# Patient Record
Sex: FEMALE | Race: WHITE | Employment: OTHER | URBAN - METROPOLITAN AREA
[De-identification: names, ages, dates, MRNs, and addresses within clinical notes are randomized per-mention and may not be internally consistent; named-entity substitution may affect disease eponyms.]

---

## 2017-01-01 ENCOUNTER — HOSPITAL ENCOUNTER (OUTPATIENT)
Dept: PHYSICAL THERAPY | Age: 67
Discharge: HOME | End: 2017-01-31
Attending: ORTHOPAEDIC SURGERY | Admitting: ORTHOPAEDIC SURGERY

## 2017-01-04 ENCOUNTER — HOSPITAL ENCOUNTER (OUTPATIENT)
Dept: PHYSICAL THERAPY | Age: 67
Discharge: HOME | End: 2017-01-04
Admitting: ORTHOPAEDIC SURGERY

## 2017-01-04 ENCOUNTER — HOSPITAL ENCOUNTER (OUTPATIENT)
Dept: PHYSICAL THERAPY | Age: 67
Discharge: HOME | End: 2016-12-31
Admitting: ORTHOPAEDIC SURGERY

## 2017-01-19 ENCOUNTER — HOSPITAL ENCOUNTER (OUTPATIENT)
Dept: PHYSICAL THERAPY | Age: 67
Discharge: HOME | End: 2017-01-19
Admitting: ORTHOPAEDIC SURGERY

## 2017-02-01 ENCOUNTER — HOSPITAL ENCOUNTER (OUTPATIENT)
Dept: PHYSICAL THERAPY | Age: 67
Discharge: HOME | End: 2017-02-28
Attending: ORTHOPAEDIC SURGERY | Admitting: ORTHOPAEDIC SURGERY

## 2017-02-16 ENCOUNTER — HOSPITAL ENCOUNTER (OUTPATIENT)
Dept: PHYSICAL THERAPY | Age: 67
Discharge: HOME | End: 2017-02-16
Admitting: ORTHOPAEDIC SURGERY

## 2017-02-17 ENCOUNTER — OFFICE VISIT (OUTPATIENT)
Dept: ORTHOPEDIC SURGERY | Age: 67
End: 2017-02-17

## 2017-02-17 VITALS — HEIGHT: 63 IN | BODY MASS INDEX: 28.36 KG/M2 | WEIGHT: 160.05 LBS

## 2017-02-17 DIAGNOSIS — S42.292S OTHER CLOSED DISPLACED FRACTURE OF PROXIMAL END OF LEFT HUMERUS, SEQUELA: Primary | ICD-10-CM

## 2017-02-17 PROCEDURE — 99024 POSTOP FOLLOW-UP VISIT: CPT | Performed by: ORTHOPAEDIC SURGERY

## 2017-02-17 PROCEDURE — 73030 X-RAY EXAM OF SHOULDER: CPT | Performed by: ORTHOPAEDIC SURGERY

## 2017-03-08 ENCOUNTER — OFFICE VISIT (OUTPATIENT)
Dept: CARDIOLOGY CLINIC | Age: 67
End: 2017-03-08

## 2017-03-08 VITALS
HEART RATE: 51 BPM | OXYGEN SATURATION: 98 % | BODY MASS INDEX: 28.16 KG/M2 | SYSTOLIC BLOOD PRESSURE: 110 MMHG | WEIGHT: 153 LBS | HEIGHT: 62 IN | DIASTOLIC BLOOD PRESSURE: 50 MMHG

## 2017-03-08 DIAGNOSIS — R00.2 PALPITATIONS: ICD-10-CM

## 2017-03-08 DIAGNOSIS — I49.3 PVC (PREMATURE VENTRICULAR CONTRACTION): ICD-10-CM

## 2017-03-08 DIAGNOSIS — I10 ESSENTIAL HYPERTENSION: ICD-10-CM

## 2017-03-08 DIAGNOSIS — I25.10 CORONARY ARTERY DISEASE INVOLVING NATIVE CORONARY ARTERY OF NATIVE HEART WITHOUT ANGINA PECTORIS: Primary | ICD-10-CM

## 2017-03-08 PROCEDURE — 99213 OFFICE O/P EST LOW 20 MIN: CPT | Performed by: INTERNAL MEDICINE

## 2017-10-18 ENCOUNTER — HOSPITAL ENCOUNTER (OUTPATIENT)
Dept: SURGERY | Age: 67
Discharge: HOME | End: 2017-10-18
Attending: OPHTHALMOLOGY | Admitting: OPHTHALMOLOGY

## 2017-10-18 VITALS
TEMPERATURE: 97 F | BODY MASS INDEX: 25.69 KG/M2 | SYSTOLIC BLOOD PRESSURE: 127 MMHG | OXYGEN SATURATION: 97 % | DIASTOLIC BLOOD PRESSURE: 60 MMHG | HEIGHT: 63 IN | HEART RATE: 65 BPM | RESPIRATION RATE: 16 BRPM | WEIGHT: 145 LBS

## 2017-10-18 LAB
GLUCOSE BLD-MCNC: 100 MG/DL (ref 70–99)
PERFORMED ON: ABNORMAL

## 2017-10-18 RX ORDER — MEPERIDINE HYDROCHLORIDE 50 MG/ML
12.5 INJECTION INTRAMUSCULAR; INTRAVENOUS; SUBCUTANEOUS EVERY 5 MIN PRN
Status: DISCONTINUED | OUTPATIENT
Start: 2017-10-18 | End: 2017-10-19 | Stop reason: HOSPADM

## 2017-10-18 RX ORDER — PROMETHAZINE HYDROCHLORIDE 25 MG/ML
6.25 INJECTION, SOLUTION INTRAMUSCULAR; INTRAVENOUS
Status: ACTIVE | OUTPATIENT
Start: 2017-10-18 | End: 2017-10-18

## 2017-10-18 RX ORDER — LIDOCAINE HYDROCHLORIDE 10 MG/ML
1 INJECTION, SOLUTION EPIDURAL; INFILTRATION; INTRACAUDAL; PERINEURAL
Status: ACTIVE | OUTPATIENT
Start: 2017-10-18 | End: 2017-10-18

## 2017-10-18 RX ORDER — HYDRALAZINE HYDROCHLORIDE 20 MG/ML
5 INJECTION INTRAMUSCULAR; INTRAVENOUS EVERY 10 MIN PRN
Status: DISCONTINUED | OUTPATIENT
Start: 2017-10-18 | End: 2017-10-19 | Stop reason: HOSPADM

## 2017-10-18 RX ORDER — DIPHENHYDRAMINE HYDROCHLORIDE 50 MG/ML
12.5 INJECTION INTRAMUSCULAR; INTRAVENOUS
Status: ACTIVE | OUTPATIENT
Start: 2017-10-18 | End: 2017-10-18

## 2017-10-18 RX ORDER — SODIUM CHLORIDE, SODIUM LACTATE, POTASSIUM CHLORIDE, CALCIUM CHLORIDE 600; 310; 30; 20 MG/100ML; MG/100ML; MG/100ML; MG/100ML
INJECTION, SOLUTION INTRAVENOUS CONTINUOUS
Status: DISCONTINUED | OUTPATIENT
Start: 2017-10-18 | End: 2017-10-19 | Stop reason: HOSPADM

## 2017-10-18 RX ORDER — LABETALOL HYDROCHLORIDE 5 MG/ML
5 INJECTION, SOLUTION INTRAVENOUS EVERY 10 MIN PRN
Status: DISCONTINUED | OUTPATIENT
Start: 2017-10-18 | End: 2017-10-19 | Stop reason: HOSPADM

## 2017-10-18 RX ORDER — ONDANSETRON 2 MG/ML
4 INJECTION INTRAMUSCULAR; INTRAVENOUS
Status: ACTIVE | OUTPATIENT
Start: 2017-10-18 | End: 2017-10-18

## 2017-10-18 ASSESSMENT — PAIN - FUNCTIONAL ASSESSMENT: PAIN_FUNCTIONAL_ASSESSMENT: 0-10

## 2017-10-18 NOTE — H&P
I have examined the patient and reviewed the history and physical and find no relevant changes. I have reviewed with the patient and/or family the risks, benefits, and alternatives to the procedure and they have agreed to proceed.     Kodak Hughes.

## 2017-10-18 NOTE — ANESTHESIA PRE-OP
HFA;VENTOLIN HFA) 108 (90 BASE) MCG/ACT inhaler Inhale 2 puffs into the lungs every 4 hours as needed. Current Facility-Administered Medications   Medication Dose Route Frequency Provider Last Rate Last Dose    lactated ringers infusion   Intravenous Continuous Bernadine Nathan MD        lidocaine PF 1 % injection 1 mL  1 mL Intradermal Once PRN Bernadine Nathan MD        bupivacaine 0.75%, phenylephrine 10%, tropicamide 1%, cyclopentolate 1%, moxifloxacin 0.5%, ketorolac 0.5% in lidocaine 2% jelly ophthalmic solution  0.3 mL Right Eye Q10 Min PRN Luis Fernando Ponce MD   0.3 mL at 10/18/17 8255    bupivacaine 0.75% and lidocaine 2% in hylenex injection (10.5 mL)   Intraocular Once Luis Fernando Ponce MD        bupivacaine 0.75% and lidocaine 2% in hylenex injection (4.5 mL)   Intraocular Once Luis Fernando oPnce MD           Allergies:     Allergies   Allergen Reactions    Morphine Other (See Comments)     Chest pain    Dust Mite Extract     Pcn [Penicillins]      Nausea      Percocet [Oxycodone-Acetaminophen]      Nausea      Toradol [Ketorolac Tromethamine] Other (See Comments)     Muscle spasms in chest       Problem List:    Patient Active Problem List   Diagnosis Code    Chest pain R07.9    Hypertension I10    Hyperlipidemia E78.5    Gross hematuria R31.0    Obstructive uropathy N13.9    PVC's (premature ventricular contractions) I49.3    Palpitations R00.2    Coronary artery disease involving native coronary artery of native heart without angina pectoris I25.10    Closed fracture of proximal end of left humerus S42.202A       Past Medical History:        Diagnosis Date    Arthritis     Asthma     Diabetes mellitus ([facility] [address]     type 2, takes PO meds    History of palpitations     Hyperlipidemia     Hypertension        Past Surgical History:        Procedure Laterality Date    CHOLECYSTECTOMY      COLONOSCOPY      CORONARY ANGIOPLASTY  10/30/14    CYST REMOVAL      from right wrist     ENDOSCOPY,

## 2017-11-08 ENCOUNTER — HOSPITAL ENCOUNTER (OUTPATIENT)
Dept: SURGERY | Age: 67
Discharge: HOME | End: 2017-11-08
Attending: OPHTHALMOLOGY | Admitting: OPHTHALMOLOGY

## 2017-11-08 VITALS
SYSTOLIC BLOOD PRESSURE: 143 MMHG | HEIGHT: 63 IN | BODY MASS INDEX: 25.69 KG/M2 | RESPIRATION RATE: 14 BRPM | WEIGHT: 145 LBS | DIASTOLIC BLOOD PRESSURE: 59 MMHG | TEMPERATURE: 97.4 F | OXYGEN SATURATION: 97 % | HEART RATE: 70 BPM

## 2017-11-08 LAB
GLUCOSE BLD-MCNC: 92 MG/DL (ref 70–99)
PERFORMED ON: NORMAL

## 2017-11-08 RX ORDER — SODIUM CHLORIDE 0.9 % (FLUSH) 0.9 %
10 SYRINGE (ML) INJECTION PRN
Status: DISCONTINUED | OUTPATIENT
Start: 2017-11-08 | End: 2017-11-09 | Stop reason: HOSPADM

## 2017-11-08 RX ORDER — SODIUM CHLORIDE 0.9 % (FLUSH) 0.9 %
10 SYRINGE (ML) INJECTION EVERY 12 HOURS SCHEDULED
Status: DISCONTINUED | OUTPATIENT
Start: 2017-11-08 | End: 2017-11-09 | Stop reason: HOSPADM

## 2017-11-08 RX ORDER — LIDOCAINE HYDROCHLORIDE 10 MG/ML
1 INJECTION, SOLUTION EPIDURAL; INFILTRATION; INTRACAUDAL; PERINEURAL
Status: ACTIVE | OUTPATIENT
Start: 2017-11-08 | End: 2017-11-08

## 2017-11-08 RX ORDER — SODIUM CHLORIDE, SODIUM LACTATE, POTASSIUM CHLORIDE, CALCIUM CHLORIDE 600; 310; 30; 20 MG/100ML; MG/100ML; MG/100ML; MG/100ML
INJECTION, SOLUTION INTRAVENOUS CONTINUOUS
Status: DISCONTINUED | OUTPATIENT
Start: 2017-11-08 | End: 2017-11-09 | Stop reason: HOSPADM

## 2017-11-08 RX ADMIN — SODIUM CHLORIDE, SODIUM LACTATE, POTASSIUM CHLORIDE, CALCIUM CHLORIDE: 600; 310; 30; 20 INJECTION, SOLUTION INTRAVENOUS at 11:03

## 2017-11-08 ASSESSMENT — PAIN SCALES - GENERAL: PAINLEVEL_OUTOF10: 0

## 2017-11-08 ASSESSMENT — PAIN - FUNCTIONAL ASSESSMENT: PAIN_FUNCTIONAL_ASSESSMENT: 0-10

## 2017-11-08 NOTE — OP NOTE
[facility]                   Rafaelniranjan Jezpratima                                  OPERATIVE REPORT    PATIENT NAME: Andie Cadena                    :        1950  MED REC NO:   [id number]                          ROOM:  ACCOUNT NO:   [de-identified]                          ADMIT DATE: 2017  PROVIDER:     Ephraim Winters MD    DATE OF PROCEDURE:  2017    PREOPERATIVE DIAGNOSIS:  Cataract, left eye. POSTOPERATIVE DIAGNOSIS:  Cataract, left eye. OPERATION:  Phacoemulsification of the cataract of the left eye with  implant. ANESTHESIA:  General / Monitored Anesthesia Care / Retrobulbar. A 2 mL  retrobulbar block and 10 mL modified Grant block using a 1:1 mixture of  0.75% Marcaine, 4% Xylocaine with epinephrine, and hyaluronidase. SURGICAL INDICATIONS:  The patient has had a painless progressive loss of  vision due to the cataractous degeneration of the lens and for that reason,  surgery is indicated. The patient is having visual problems with current  lifestyle. A new eyeglasses prescription was unable to adequately improve  functional vision. DETAILS OF PROCEDURE:  The patient was taken to the operating room and was  prepped and draped in the usual manner after obtaining satisfactory local  anesthesia. Attention was turned towards the operative eye and a lid speculum was  inserted. A 2.5 mm keratome was used to make a limbal incision at 180  degrees. Viscoat was then placed in the eye to fill the anterior chamber  and a side port incision was made with a Superblade through the clear  cornea. The incision was located about 2 o'clock to the left of the  original incision. A bent needle was then used to make a cut in the  anterior capsule and start a capsulorrhexis tear. This was then grasped  with Utrata forceps and a 360 degree tear was completed.   Poulan dissection  was then done with BSS to separate the

## 2017-11-08 NOTE — ANESTHESIA PRE-OP
Department of Anesthesiology  Preprocedure Note       Name:  Laura Meza   Age:  69 y.o.  :  1950                                          MRN:  [id number]         Date:  2017      Surgeon:    Procedure:    Medications prior to admission:   Prior to Admission medications    Medication Sig Start Date End Date Taking? Authorizing Provider   lisinopril-hydrochlorothiazide (PRINZIDE;ZESTORETIC) 20-12.5 MG per tablet Take 1 tablet by mouth daily    Historical Provider, MD   levocetirizine (XYZAL) 5 MG tablet Take 5 mg by mouth nightly    Historical Provider, MD   Linagliptin (TRADJENTA PO) Take by mouth    Historical Provider, MD   Cyanocobalamin (VITAMIN B-12 PO) Take by mouth daily    Historical Provider, MD   VITAMIN D, CHOLECALCIFEROL, PO Take by mouth daily    Historical Provider, MD   Dulaglutide (TRULICITY) 1.5 CI/2.5TE SOPN Inject into the skin once a week    Historical Provider, MD   metoprolol tartrate (LOPRESSOR) 25 MG tablet TAKE 1 TABLET BY MOUTH DAILY 17   Maddi Kearney MD   ondansetron [facility]) 4 MG tablet Take 2 tablets by mouth every 8 hours as needed for Nausea or Vomiting 12/15/16 12/15/17  Velvet Murray MD   magnesium oxide (MAGNESIUM-OXIDE) 400 (241.3 MG) MG TABS tablet Take 1 tablet by mouth 2 times daily 16   Maddi Kearney MD   Blood Pressure Monitoring (B-D ASSURE BPM/AUTO ARM CUFF) MISC For essential hypertension 11/11/15   Maddi Kearney MD   gabapentin (NEURONTIN) 100 MG capsule Take 800 mg by mouth 3 times daily She only takes at night because it makes her tired     Historical Provider, MD   tiZANidine (ZANAFLEX) 4 MG tablet Take 4 mg by mouth every 6 hours as needed. Historical Provider, MD   lidocaine (LIDODERM) 5 % Place 1 patch onto the skin as needed for Pain. 12 hours on, 12 hours off. Historical Provider, MD   calcium carbonate (TUMS) 500 MG chewable tablet Take 1 tablet by mouth daily.     Historical Provider, MD   metFORMIN (GLUCOPHAGE) 500 MG tablet Take 1,000 mg by mouth 2 times daily (with meals). Historical Provider, MD   albuterol (PROVENTIL HFA;VENTOLIN HFA) 108 (90 BASE) MCG/ACT inhaler Inhale 2 puffs into the lungs every 4 hours as needed. Historical Provider, MD   guaiFENesin (MUCINEX) 600 MG SR tablet Take 1,200 mg by mouth 2 times daily as needed. Historical Provider, MD   simvastatin (ZOCOR) 20 MG tablet Take 20 mg by mouth nightly. Historical Provider, MD   levocetirizine (XYZAL) 5 MG tablet Take 5 mg by mouth nightly. Historical Provider, MD       Current medications:    Current Outpatient Prescriptions   Medication Sig Dispense Refill    lisinopril-hydrochlorothiazide (PRINZIDE;ZESTORETIC) 20-12.5 MG per tablet Take 1 tablet by mouth daily      levocetirizine (XYZAL) 5 MG tablet Take 5 mg by mouth nightly      Linagliptin (TRADJENTA PO) Take by mouth      Cyanocobalamin (VITAMIN B-12 PO) Take by mouth daily      VITAMIN D, CHOLECALCIFEROL, PO Take by mouth daily      Dulaglutide (TRULICITY) 1.5 HQ/4.2VX SOPN Inject into the skin once a week      metoprolol tartrate (LOPRESSOR) 25 MG tablet TAKE 1 TABLET BY MOUTH DAILY 90 tablet 2    ondansetron (ZOFRAN) 4 MG tablet Take 2 tablets by mouth every 8 hours as needed for Nausea or Vomiting 30 tablet 0    magnesium oxide (MAGNESIUM-OXIDE) 400 (241.3 MG) MG TABS tablet Take 1 tablet by mouth 2 times daily 60 tablet 11    Blood Pressure Monitoring (B-D ASSURE BPM/AUTO ARM CUFF) MISC For essential hypertension 1 each 0    gabapentin (NEURONTIN) 100 MG capsule Take 800 mg by mouth 3 times daily She only takes at night because it makes her tired       tiZANidine (ZANAFLEX) 4 MG tablet Take 4 mg by mouth every 6 hours as needed.  lidocaine (LIDODERM) 5 % Place 1 patch onto the skin as needed for Pain. 12 hours on, 12 hours off.  calcium carbonate (TUMS) 500 MG chewable tablet Take 1 tablet by mouth daily.       metFORMIN (GLUCOPHAGE) 500 MG tablet Take 1,000 mg by mouth 2 times daily (with meals).  albuterol (PROVENTIL HFA;VENTOLIN HFA) 108 (90 BASE) MCG/ACT inhaler Inhale 2 puffs into the lungs every 4 hours as needed.  guaiFENesin (MUCINEX) 600 MG SR tablet Take 1,200 mg by mouth 2 times daily as needed.  simvastatin (ZOCOR) 20 MG tablet Take 20 mg by mouth nightly.  levocetirizine (XYZAL) 5 MG tablet Take 5 mg by mouth nightly. Current Facility-Administered Medications   Medication Dose Route Frequency Provider Last Rate Last Dose    [START ON 11/8/2017] bupivacaine 0.75% and lidocaine 2% in hylenex injection (10.5 mL)   Intraocular Once Rhoda Mike MD        [START ON 11/8/2017] bupivacaine 0.75% and lidocaine 2% in hylenex injection (4.5 mL)   Intraocular Once Rhoda Mike MD           Allergies: Allergies   Allergen Reactions    Morphine Other (See Comments)     Chest pain    Sulfamethoxazole      Other reaction(s): Nausea/vomit    Trimethoprim      Other reaction(s): Nausea/vomit    Alendronate Other (See Comments)     Chest pain     Bactrim [Sulfamethoxazole-Trimethoprim]     Buspirone      Other reaction(s): Other (See Comments)  Gi upset     Calcitonin (War)      Other reaction(s): Other (See Comments)  Headsaches     Demerol Hcl [Meperidine]     Dust Mite Extract     Esomeprazole Magnesium      Other reaction(s): Other (See Comments)  ELEVATED BP    Glipizide Other (See Comments)     Hypoglycemia    Metformin Diarrhea    Mometasone     Omeprazole      Other reaction(s):  Other (See Comments)  Sick to stomach     Other Other (See Comments)     Trazadone- vomiting     Oxycodone      Other reaction(s): Nausea;Vomitting    Oxycodone-Acetaminophen     Pcn [Penicillins]      Other reaction(s): Trouble Breathing  Nausea      Percocet [Oxycodone-Acetaminophen]      Nausea      Prednisone     Propoxyphene     Ranitidine      Other reaction(s): Nausea    Sulfa Antibiotics     Toradol [Ketorolac Tromethamine] Other (See Comments)     Muscle spasms in chest    Tramadol      Other reaction(s): Other (See Comments)  Dizziness     Zantac [Ranitidine Hcl]        Problem List:    Patient Active Problem List   Diagnosis Code    Chest pain R07.9    Hypertension I10    Hyperlipidemia E78.5    Gross hematuria R31.0    Obstructive uropathy N13.9    PVC's (premature ventricular contractions) I49.3    Palpitations R00.2    Coronary artery disease involving native coronary artery of native heart without angina pectoris I25.10    Closed fracture of proximal end of left humerus S42.202A       Past Medical History:        Diagnosis Date    Arthritis     Asthma     Diabetes mellitus ([facility] [address]     type 2, takes PO meds    History of palpitations     Hyperlipidemia     Hypertension        Past Surgical History:        Procedure Laterality Date    CATARACT REMOVAL WITH IMPLANT Right 10/18/2017    entered to epic from h&P    [address]  10/30/14    CYST REMOVAL      from right wrist     ENDOSCOPY, COLON, DIAGNOSTIC      GASTRIC BYPASS SURGERY  2005    HERNIA REPAIR      HYSTERECTOMY      age 30    OTHER SURGICAL HISTORY  CYSTOSCOPY, RIGHT URETERAL STONE MANIPULATION WITH RIGHT    SHOULDER ARTHROPLASTY Left 12/15/2016    LEFT PROXIMAL HUMERUS OPEN REDUCTION INTERNAL FIXATION     TONSILLECTOMY         Social History:    Social History   Substance Use Topics    Smoking status: Never Smoker    Smokeless tobacco: Never Used    Alcohol use No                                Counseling given: Not Answered      Vital Signs (Current): There were no vitals filed for this visit.                                            BP Readings from Last 3 Encounters:   10/18/17 127/60   03/08/17 (!) 110/50   12/16/16 121/72       NPO Status:                                                                                 BMI:   Wt Readings from Last 3 Encounters:   11/02/17 145 lb (65.8 kg) the risks and benefits of PIV, MAC, IV Narcotics, PACU. All questions were answered the patient agrees with the plan)        Anesthetic plan and risks discussed with patient. Plan discussed with CRNA.                   Maci Bailey MD   11/7/2017

## 2017-11-08 NOTE — BRIEF OP NOTE
Brief Postoperative Note    Kinga Benitez  YOB: 1950  [id number]    Pre-operative Diagnosis: CATARACT OS    Post-operative Diagnosis: Same    Procedure: PHACO OS WITH IOL    Anesthesia: MAC    Surgeons/Assistants: Abimbola Fleming MD    Estimated Blood Loss: less than 50     Complications: None    Specimens: Was Not Obtained    Findings: NONE    Electronically signed by Tracee Underwood MD on 11/8/2017 at 1:04 PM

## 2017-11-14 NOTE — H&P
I have reviewed the history and physical and examined the patient. I found no relevant changes. I have reviewed with the patient and/or family the risks, benefits, and alternatives to the procedure.

## 2018-04-04 ENCOUNTER — HOSPITAL ENCOUNTER (OUTPATIENT)
Dept: SURGERY | Age: 68
Discharge: HOME | End: 2018-04-04
Attending: OPHTHALMOLOGY | Admitting: OPHTHALMOLOGY

## 2018-04-04 VITALS — HEART RATE: 58 BPM | SYSTOLIC BLOOD PRESSURE: 124 MMHG | DIASTOLIC BLOOD PRESSURE: 63 MMHG

## 2018-04-04 DIAGNOSIS — R07.9 CHEST PAIN: ICD-10-CM

## 2018-04-04 RX ORDER — TROPICAMIDE 10 MG/ML
1 SOLUTION/ DROPS OPHTHALMIC EVERY 5 MIN PRN
Status: DISCONTINUED | OUTPATIENT
Start: 2018-04-04 | End: 2018-04-05 | Stop reason: HOSPADM

## 2018-04-04 RX ORDER — PREDNISOLONE ACETATE 10 MG/ML
1 SUSPENSION/ DROPS OPHTHALMIC
Status: COMPLETED | OUTPATIENT
Start: 2018-04-04 | End: 2018-04-04

## 2018-04-04 RX ORDER — PROPARACAINE HYDROCHLORIDE 5 MG/ML
1 SOLUTION/ DROPS OPHTHALMIC
Status: COMPLETED | OUTPATIENT
Start: 2018-04-04 | End: 2018-04-04

## 2018-04-04 RX ORDER — PHENYLEPHRINE HCL 2.5 %
1 DROPS OPHTHALMIC (EYE) EVERY 5 MIN PRN
Status: DISCONTINUED | OUTPATIENT
Start: 2018-04-04 | End: 2018-04-05 | Stop reason: HOSPADM

## 2018-04-04 RX ADMIN — TROPICAMIDE 1 DROP: 10 SOLUTION/ DROPS OPHTHALMIC at 14:01

## 2018-04-04 RX ADMIN — PREDNISOLONE ACETATE 1 DROP: 10 SUSPENSION/ DROPS OPHTHALMIC at 14:44

## 2018-04-04 RX ADMIN — TROPICAMIDE 1 DROP: 10 SOLUTION/ DROPS OPHTHALMIC at 14:07

## 2018-04-04 RX ADMIN — PROPARACAINE HYDROCHLORIDE 1 DROP: 5 SOLUTION/ DROPS OPHTHALMIC at 14:40

## 2018-04-04 RX ADMIN — Medication 1 DROP: at 14:07

## 2018-04-04 RX ADMIN — Medication 1 DROP: at 14:01

## 2018-04-11 ENCOUNTER — HOSPITAL ENCOUNTER (OUTPATIENT)
Dept: SURGERY | Age: 68
Discharge: HOME | End: 2018-04-11
Attending: OPHTHALMOLOGY | Admitting: OPHTHALMOLOGY

## 2018-04-11 VITALS — DIASTOLIC BLOOD PRESSURE: 59 MMHG | SYSTOLIC BLOOD PRESSURE: 123 MMHG | HEART RATE: 63 BPM

## 2018-04-11 DIAGNOSIS — R07.9 CHEST PAIN: ICD-10-CM

## 2018-04-11 RX ORDER — PHENYLEPHRINE HCL 2.5 %
1 DROPS OPHTHALMIC (EYE) EVERY 5 MIN PRN
Status: DISCONTINUED | OUTPATIENT
Start: 2018-04-11 | End: 2018-04-12 | Stop reason: HOSPADM

## 2018-04-11 RX ORDER — PREDNISOLONE ACETATE 10 MG/ML
1 SUSPENSION/ DROPS OPHTHALMIC
Status: COMPLETED | OUTPATIENT
Start: 2018-04-11 | End: 2018-04-11

## 2018-04-11 RX ORDER — PROPARACAINE HYDROCHLORIDE 5 MG/ML
1 SOLUTION/ DROPS OPHTHALMIC
Status: COMPLETED | OUTPATIENT
Start: 2018-04-11 | End: 2018-04-11

## 2018-04-11 RX ORDER — TROPICAMIDE 10 MG/ML
1 SOLUTION/ DROPS OPHTHALMIC EVERY 5 MIN PRN
Status: DISCONTINUED | OUTPATIENT
Start: 2018-04-11 | End: 2018-04-12 | Stop reason: HOSPADM

## 2018-04-11 RX ADMIN — Medication 1 DROP: at 14:21

## 2018-04-11 RX ADMIN — PREDNISOLONE ACETATE 1 DROP: 10 SUSPENSION/ DROPS OPHTHALMIC at 15:13

## 2018-04-11 RX ADMIN — Medication 1 DROP: at 14:16

## 2018-04-11 RX ADMIN — TROPICAMIDE 1 DROP: 10 SOLUTION/ DROPS OPHTHALMIC at 14:16

## 2018-04-11 RX ADMIN — PROPARACAINE HYDROCHLORIDE 1 DROP: 5 SOLUTION/ DROPS OPHTHALMIC at 15:09

## 2018-04-11 RX ADMIN — TROPICAMIDE 1 DROP: 10 SOLUTION/ DROPS OPHTHALMIC at 14:21

## 2018-04-19 ENCOUNTER — OFFICE VISIT (OUTPATIENT)
Dept: CARDIOLOGY CLINIC | Age: 68
End: 2018-04-19

## 2018-04-19 VITALS
HEART RATE: 65 BPM | SYSTOLIC BLOOD PRESSURE: 118 MMHG | OXYGEN SATURATION: 97 % | DIASTOLIC BLOOD PRESSURE: 60 MMHG | BODY MASS INDEX: 23.04 KG/M2 | HEIGHT: 63 IN | WEIGHT: 130 LBS

## 2018-04-19 DIAGNOSIS — I25.10 CORONARY ARTERY DISEASE INVOLVING NATIVE CORONARY ARTERY OF NATIVE HEART WITHOUT ANGINA PECTORIS: ICD-10-CM

## 2018-04-19 DIAGNOSIS — I10 ESSENTIAL HYPERTENSION: ICD-10-CM

## 2018-04-19 DIAGNOSIS — E78.5 HYPERLIPIDEMIA, UNSPECIFIED HYPERLIPIDEMIA TYPE: ICD-10-CM

## 2018-04-19 DIAGNOSIS — R00.2 PALPITATIONS: ICD-10-CM

## 2018-04-19 DIAGNOSIS — I49.3 PVC'S (PREMATURE VENTRICULAR CONTRACTIONS): Primary | ICD-10-CM

## 2018-04-19 PROCEDURE — 99214 OFFICE O/P EST MOD 30 MIN: CPT | Performed by: INTERNAL MEDICINE

## 2018-05-29 RX ORDER — METOPROLOL TARTRATE 50 MG/1
50 TABLET, FILM COATED ORAL 2 TIMES DAILY
Qty: 180 TABLET | Refills: 3 | Status: SHIPPED | OUTPATIENT
Start: 2018-05-29 | End: 2019-05-17 | Stop reason: SDUPTHER

## 2018-06-22 ENCOUNTER — TELEPHONE (OUTPATIENT)
Dept: CARDIOLOGY CLINIC | Age: 68
End: 2018-06-22

## 2018-06-22 ENCOUNTER — HOSPITAL ENCOUNTER (OUTPATIENT)
Dept: CARDIOLOGY | Facility: CLINIC | Age: 68
Discharge: HOME | End: 2018-06-22
Attending: INTERNAL MEDICINE | Admitting: INTERNAL MEDICINE

## 2018-06-22 DIAGNOSIS — R07.9 CHEST PAIN: ICD-10-CM

## 2018-06-22 LAB
LV EF: 55 %
LVEF MODALITY: NORMAL

## 2018-06-27 ENCOUNTER — TELEPHONE (OUTPATIENT)
Dept: CARDIOLOGY CLINIC | Age: 68
End: 2018-06-27

## 2019-05-17 RX ORDER — METOPROLOL TARTRATE 50 MG/1
TABLET, FILM COATED ORAL
Qty: 180 TABLET | Refills: 0 | Status: SHIPPED | OUTPATIENT
Start: 2019-05-17 | End: 2019-08-24 | Stop reason: SDUPTHER

## 2019-05-17 NOTE — TELEPHONE ENCOUNTER
4/19/18 Dr. Izaiah Dutta:  1. Echocardiogram for AS  2. Obtain blood work results from PCP, need LIPIDS to adjust statin  3. Increase metoprolol to 50 mg BID as tolerated  4.  Follow up in 1 year    Next OV 5/23/19 JOELLEN

## 2019-06-14 NOTE — PROGRESS NOTES
[address] [facility]   Cardiovascular Evaluation    PATIENT: Vannesa Abdul  DATE: 19  MRN: [id number]  [facility]: [id number]  : 1950      Primary Care Doctor: Myron Faulkner     Reason for evaluation:   1 Year Follow Up; Coronary Artery Disease; Hypertension; Hyperlipidemia; Chest Pain; Palpitations; Shortness of Breath; and Cardiac Clearance (upcoming colonoscopy)      Subjective:   History of present illness on initial date of evaluation:   Vannesa Abdul is a 77 y.o. patient who presents for follow up for chest pain. She reported having intermittent episodes of chest pain for past year. She reported to having chest pain with activity as well as rest.  She awoke with pain at 4 am. She described the pain mid sternal and left anterior chest which felt like a heavy pressure squeezing pain with some burning characteristics. Pain radiated in to her back, shoulder and down her left arm. She had associated SOB, diaphoresis, nausea and near syncope. Pain lasted 15-20 minutes and resolved without intervention. She also had twinges of sharp chest pain periodically. She described a previous episode of similar chest pain that occurred April 3,  2013 and was [facility] to [facility] [place] with full work up which was negative. At her last visit she had complaints of palpitations she described as a fast heart rate. She reported to having SOB, fatigue and dizziness. She reported dizziness on exam. She recently wore a 24 hour holter. She reported to continuing to having runs of PVC's,however they resolved quickly on their own. On follow up 11/11/15 she stated that she had been following with Dr. Wing Huddleston. Episodes of palpitations had greatly improved with the current medication regimen. Complains of BLE swelling and pain, worse with walking. Pain was in the feet, ankles, calf and glutes. Wakes up with swelling, worsens through out the day.   Denied exertional chest pain and shortness of breath. On follow-up 12/8/15 she stated that since stopping Norvasc her leg swelling had improved but now her episodes of palpitation have increased. Palpitations occured daily. The episodes are bothersome. She is extremely fatigued after the episodes of palpitation. She wore a holter monitor on 12/8/16 showed asymptomatic idoventricular rhythm. At her last visit her metoprolol was increased to 50 mg BID. She reports today that she is having a sharp chest pain that lasts for about 5 minutes. She does not notice that exertion makes it worse. She does notice that bending over and holding her chest helps relieve the pain. She is short of breath but attributes it to her asthma. She reduced her metoprolol on her own due to dizziness. She had blood ion her stool and will be having a colonoscopy. Patient Active Problem List   Diagnosis    Chest pain    Hypertension    Hyperlipidemia    Gross hematuria    Obstructive uropathy    PVC's (premature ventricular contractions)    Palpitations    Coronary artery disease involving native coronary artery of native heart without angina pectoris    Closed fracture of proximal end of left humerus         Past Medical History:   has a past medical history of Arthritis, Asthma, Diabetes mellitus ([address] History of palpitations, Hyperlipidemia, and Hypertension. Surgical History:   has a past surgical history that includes Cholecystectomy; Gastric bypass surgery (2005); Hysterectomy; hernia repair; cyst removal; Tonsillectomy; Colonoscopy; Endoscopy, colon, diagnostic; Coronary angioplasty (10/30/14); other surgical history (CYSTOSCOPY, RIGHT URETERAL STONE MANIPULATION WITH RIGHT); Total shoulder arthroplasty (Left, 12/15/2016); Cataract removal with implant (Right, 10/18/2017); and Cataract removal with implant (Left, 11/08/2017). Social History:   She is , retired from [address] [place] [address] and lives with her son in [place].   She reports that  simvastatin (ZOCOR) 20 MG tablet Take 20 mg by mouth nightly. No current facility-administered medications for this visit. Allergies:  Morphine; Sulfamethoxazole; Trimethoprim; Alendronate; Bactrim [sulfamethoxazole-trimethoprim]; Buspirone; Calcitonin (salmon); Demerol hcl [meperidine]; Dust mite extract; Esomeprazole magnesium; Glipizide; Metformin; Mometasone; Omeprazole; Other; Oxycodone; Oxycodone-acetaminophen; Pcn [penicillins]; Percocet [oxycodone-acetaminophen]; Prednisone; Propoxyphene; Ranitidine; Sulfa antibiotics; Toradol [ketorolac tromethamine]; Tramadol; and Zantac [ranitidine hcl]     Review of Systems:   All 12 point review of symptoms completed. Pertinent positives identified in the HPI, all other review of symptoms negative as below. Objective:   PHYSICAL EXAM:    Vitals:    06/17/19 0801   BP: 112/66   Pulse: 63   SpO2: 98%    Weight: 120 lb 8 oz (54.7 kg)     Wt Readings from Last 3 Encounters:   06/17/19 120 lb 8 oz (54.7 kg)   04/19/18 130 lb (59 kg)   11/08/17 145 lb (65.8 kg)         General Appearance:  Alert, cooperative, no distress, appears stated age   Head:  Normocephalic, atraumatic   Eyes:  PERRL, conjunctiva/corneas clear   Nose: Nares normal, no drainage or sinus tenderness   Throat: Lips, mucosa, and tongue normal   Neck: Supple, symmetrical, trachea midline, NL thyroid no carotid bruit or JVD   Lungs:   CTAB, respirations unlabored   Chest Wall:  No tenderness or deformity   Heart:  RRR s1s2  noted; no rub or gallop   Abdomen:   Soft, non-tender, +BS x 4, no masses, no organomegaly   Extremities: Extremities normal, atraumatic, no cyanosis. no edema.    Pulses: 2+ and symmetric   Skin: Skin color, texture, turgor normal, no rashes or lesions   Pysch: Normal mood and affect   Neurologic: Normal gross motor and sensory exam.         LABS   CBC:      Lab Results   Component Value Date    WBC 6.2 12/15/2016    RBC 4.29 12/15/2016    HGB 13.0 12/15/2016    HCT 40.4 12/15/2016    MCV 94.1 12/15/2016    RDW 13.1 12/15/2016     12/15/2016     CMP:  Lab Results   Component Value Date     12/15/2016    K 4.8 12/15/2016     12/15/2016    CO2 29 12/15/2016    BUN 19 12/15/2016    CREATININE 0.9 12/15/2016    GFRAA >60 12/15/2016    GFRAA >60 04/02/2013    AGRATIO 1.5 01/05/2015    LABGLOM >60 12/15/2016    GLUCOSE 166 12/15/2016    PROT 7.5 01/05/2015    CALCIUM 10.2 12/15/2016    BILITOT 0.6 01/05/2015    ALKPHOS 59 01/05/2015    AST 24 01/05/2015    ALT 20 01/05/2015     PT/INR:   No components found for: PTPATIENT,  PTINR  Liver:  No components found for: CHLPL  Lab Results   Component Value Date    ALT 20 01/05/2015    AST 24 01/05/2015    ALKPHOS 59 01/05/2015    BILITOT 0.6 01/05/2015     Lab Results   Component Value Date    LABA1C 6.5 01/05/2015     Lipids:         Lab Results   Component Value Date    TRIG 113 12/11/2015    TRIG 84 10/30/2014            Lab Results   Component Value Date    HDL 57 12/11/2015    HDL 70 (H) 10/30/2014            Lab Results   Component Value Date    LDLCALC 59 12/11/2015    LDLCALC 77 10/30/2014            Lab Results   Component Value Date    LABVLDL 23 12/11/2015    LABVLDL 17 10/30/2014         CARDIAC DATA   EKG: (reviewed)  10/21/2014 Sinus beba, possible old anterior/septal infarct. 3/27/2015 Sinus rhythm with bigeminy PVC, old anterior infarct pattern,    Holter results: 4/1/15  Holter reviewed. Significant multifocal ectopy. >15% PVC burden, <1% atrial ectopy. Resting beba heart rate. At night, most ectopy especially ventricular ectopy had become quiet. Labs ok    6/17/2019 NSR with PRWP    ECHO 6/22/18:  Normal left ventricle systolic function with an estimated ejection fraction   of 55%. No regional wall motion abnormalities are seen. Grade I diastolic dysfunction with normal filing pressure. The non-coronary cusp of the aortic valve is thickened/calcified with   decreased leaflet mobility.  No aortic

## 2019-06-17 ENCOUNTER — OFFICE VISIT (OUTPATIENT)
Dept: CARDIOLOGY CLINIC | Age: 69
End: 2019-06-17
Payer: MEDICARE

## 2019-06-17 VITALS
OXYGEN SATURATION: 98 % | DIASTOLIC BLOOD PRESSURE: 66 MMHG | BODY MASS INDEX: 21.35 KG/M2 | SYSTOLIC BLOOD PRESSURE: 112 MMHG | HEIGHT: 63 IN | HEART RATE: 63 BPM | WEIGHT: 120.5 LBS

## 2019-06-17 DIAGNOSIS — I10 ESSENTIAL HYPERTENSION: ICD-10-CM

## 2019-06-17 DIAGNOSIS — R42 DIZZINESS: ICD-10-CM

## 2019-06-17 DIAGNOSIS — I49.3 PVC (PREMATURE VENTRICULAR CONTRACTION): ICD-10-CM

## 2019-06-17 DIAGNOSIS — I35.8 AORTIC VALVE SCLEROSIS: ICD-10-CM

## 2019-06-17 DIAGNOSIS — R07.89 OTHER CHEST PAIN: Primary | ICD-10-CM

## 2019-06-17 PROCEDURE — 99214 OFFICE O/P EST MOD 30 MIN: CPT | Performed by: INTERNAL MEDICINE

## 2019-06-17 PROCEDURE — 93000 ELECTROCARDIOGRAM COMPLETE: CPT | Performed by: INTERNAL MEDICINE

## 2019-06-17 NOTE — LETTER
[address] [facility]   Cardiovascular Evaluation    PATIENT: Ginny Austin  DATE: 19  MRN: [id number]  CSN: [id number]  : 1950      Primary Care Doctor: Kinga Zapata     Reason for evaluation:   1 Year Follow Up; Coronary Artery Disease; Hypertension; Hyperlipidemia; Chest Pain; Palpitations; Shortness of Breath; and Cardiac Clearance (upcoming colonoscopy)      Subjective:   History of present illness on initial date of evaluation:   Ginny Austin is a 77 y.o. patient who presents for follow up for chest pain. She reported having intermittent episodes of chest pain for past year. She reported to having chest pain with activity as well as rest.  She awoke with pain at 4 am. She described the pain mid sternal and left anterior chest which felt like a heavy pressure squeezing pain with some burning characteristics. Pain radiated in to her back, shoulder and down her left arm. She had associated SOB, diaphoresis, nausea and near syncope. Pain lasted 15-20 minutes and resolved without intervention. She also had twinges of sharp chest pain periodically. She described a previous episode of similar chest pain that occurred April 3,  2013 and was admitted to [facility] with full work up which was negative. At her last visit she had complaints of palpitations she described as a fast heart rate. She reported to having SOB, fatigue and dizziness. She reported dizziness on exam. She recently wore a 24 hour holter. She reported to continuing to having runs of PVC's,however they resolved quickly on their own. On follow up 11/11/15 she stated that she had been following with Dr. Geri Morris. Episodes of palpitations had greatly improved with the current medication regimen. Complains of BLE swelling and pain, worse with walking. Pain was in the feet, ankles, calf and glutes. Wakes up with swelling, worsens through out the day.   Denied exertional chest pain and shortness of breath. On follow-up 12/8/15 she stated that since stopping Norvasc her leg swelling had improved but now her episodes of palpitation have increased. Palpitations occured daily. The episodes are bothersome. She is extremely fatigued after the episodes of palpitation. She wore a holter monitor on 12/8/16 showed asymptomatic idoventricular rhythm. At her last visit her metoprolol was increased to 50 mg BID. She reports today that she is having a sharp chest pain that lasts for about 5 minutes. She does not notice that exertion makes it worse. She does notice that bending over and holding her chest helps relieve the pain. She is short of breath but attributes it to her asthma. She reduced her metoprolol on her own due to dizziness. She had blood ion her stool and will be having a colonoscopy. Patient Active Problem List   Diagnosis    Chest pain    Hypertension    Hyperlipidemia    Gross hematuria    Obstructive uropathy    PVC's (premature ventricular contractions)    Palpitations    Coronary artery disease involving native coronary artery of native heart without angina pectoris    Closed fracture of proximal end of left humerus         Past Medical History:   has a past medical history of Arthritis, Asthma, Diabetes mellitus ([address] History of palpitations, Hyperlipidemia, and Hypertension. Surgical History:   has a past surgical history that includes Cholecystectomy; Gastric bypass surgery (2005); Hysterectomy; hernia repair; cyst removal; Tonsillectomy; Colonoscopy; Endoscopy, colon, diagnostic; Coronary angioplasty (10/30/14); other surgical history (CYSTOSCOPY, RIGHT URETERAL STONE MANIPULATION WITH RIGHT); Total shoulder arthroplasty (Left, 12/15/2016); Cataract removal with implant (Right, 10/18/2017); and Cataract removal with implant (Left, 11/08/2017).      Social History:   She is , retired from [employer] and lives with her son in [place] [facility]. She reports that she has never smoked. She does not have any smokeless tobacco history on file. She reports that she does not drink alcohol or use illicit drugs. Family History: Dad history unknown  No evidence for sudden cardiac death or premature CAD    Home Medications:  Reviewed and are listed in nursing record. and/or listed below  Current Outpatient Medications   Medication Sig Dispense Refill    metoprolol tartrate (LOPRESSOR) 50 MG tablet TAKE 1 TABLET BY MOUTH TWICE DAILY 180 tablet 0    MAGNESIUM-OXIDE 400 (241.3 Mg) MG TABS tablet TAKE 1 TABLET BY MOUTH TWICE DAILY 180 tablet 3    lisinopril-hydrochlorothiazide (PRINZIDE;ZESTORETIC) 20-12.5 MG per tablet Take 1 tablet by mouth daily      levocetirizine (XYZAL) 5 MG tablet Take 5 mg by mouth nightly      Linagliptin (TRADJENTA PO) Take by mouth      Cyanocobalamin (VITAMIN B-12 PO) Take by mouth daily      VITAMIN D, CHOLECALCIFEROL, PO Take by mouth daily      Dulaglutide (TRULICITY) 1.5 MY/1.2HD SOPN Inject into the skin once a week      magnesium oxide (MAGNESIUM-OXIDE) 400 (241.3 MG) MG TABS tablet Take 1 tablet by mouth 2 times daily 60 tablet 11    Blood Pressure Monitoring (B-D ASSURE BPM/AUTO ARM CUFF) MISC For essential hypertension 1 each 0    gabapentin (NEURONTIN) 100 MG capsule Take 500 mg by mouth 4 times daily. She only takes at night because it makes her tired       tiZANidine (ZANAFLEX) 4 MG tablet Take 4 mg by mouth every 6 hours as needed.  lidocaine (LIDODERM) 5 % Place 1 patch onto the skin as needed for Pain. 12 hours on, 12 hours off.  calcium carbonate (TUMS) 500 MG chewable tablet Take 1 tablet by mouth daily.  metFORMIN (GLUCOPHAGE) 500 MG tablet Take 1,000 mg by mouth 2 times daily (with meals).  albuterol (PROVENTIL HFA;VENTOLIN HFA) 108 (90 BASE) MCG/ACT inhaler Inhale 2 puffs into the lungs every 4 hours as needed.  guaiFENesin (MUCINEX) 600 MG SR tablet Take 1,200 mg by mouth 2 times daily as needed.  simvastatin (ZOCOR) 20 MG tablet Take 20 mg by mouth nightly. No current facility-administered medications for this visit. Allergies:  Morphine; Sulfamethoxazole; Trimethoprim; Alendronate; Bactrim [sulfamethoxazole-trimethoprim]; Buspirone; Calcitonin (salmon); Demerol hcl [meperidine]; Dust mite extract; Esomeprazole magnesium; Glipizide; Metformin; Mometasone; Omeprazole; Other; Oxycodone; Oxycodone-acetaminophen; Pcn [penicillins]; Percocet [oxycodone-acetaminophen]; Prednisone; Propoxyphene; Ranitidine; Sulfa antibiotics; Toradol [ketorolac tromethamine]; Tramadol; and Zantac [ranitidine hcl]     Review of Systems:   All 12 point review of symptoms completed. Pertinent positives identified in the HPI, all other review of symptoms negative as below. Objective:   PHYSICAL EXAM:    Vitals:    06/17/19 0801   BP: 112/66   Pulse: 63   SpO2: 98%    Weight: 120 lb 8 oz (54.7 kg)     Wt Readings from Last 3 Encounters:   06/17/19 120 lb 8 oz (54.7 kg)   04/19/18 130 lb (59 kg)   11/08/17 145 lb (65.8 kg)         General Appearance:  Alert, cooperative, no distress, appears stated age   Head:  Normocephalic, atraumatic   Eyes:  PERRL, conjunctiva/corneas clear   Nose: Nares normal, no drainage or sinus tenderness   Throat: Lips, mucosa, and tongue normal   Neck: Supple, symmetrical, trachea midline, NL thyroid no carotid bruit or JVD   Lungs:   CTAB, respirations unlabored   Chest Wall:  No tenderness or deformity   Heart:  RRR s1s2  noted; no rub or gallop   Abdomen:   Soft, non-tender, +BS x 4, no masses, no organomegaly   Extremities: Extremities normal, atraumatic, no cyanosis. no edema.    Pulses: 2+ and symmetric   Skin: Skin color, texture, turgor normal, no rashes or lesions   Pysch: Normal mood and affect   Neurologic: Normal gross motor and sensory exam.         LABS   CBC:      Lab Results Component Value Date    WBC 6.2 12/15/2016    RBC 4.29 12/15/2016    HGB 13.0 12/15/2016    HCT 40.4 12/15/2016    MCV 94.1 12/15/2016    RDW 13.1 12/15/2016     12/15/2016     CMP:  Lab Results   Component Value Date     12/15/2016    K 4.8 12/15/2016     12/15/2016    CO2 29 12/15/2016    BUN 19 12/15/2016    CREATININE 0.9 12/15/2016    GFRAA >60 12/15/2016    GFRAA >60 04/02/2013    AGRATIO 1.5 01/05/2015    LABGLOM >60 12/15/2016    GLUCOSE 166 12/15/2016    PROT 7.5 01/05/2015    CALCIUM 10.2 12/15/2016    BILITOT 0.6 01/05/2015    ALKPHOS 59 01/05/2015    AST 24 01/05/2015    ALT 20 01/05/2015     PT/INR:   No components found for: PTPATIENT,  PTINR  Liver:  No components found for: CHLPL  Lab Results   Component Value Date    ALT 20 01/05/2015    AST 24 01/05/2015    ALKPHOS 59 01/05/2015    BILITOT 0.6 01/05/2015     Lab Results   Component Value Date    LABA1C 6.5 01/05/2015     Lipids:         Lab Results   Component Value Date    TRIG 113 12/11/2015    TRIG 84 10/30/2014            Lab Results   Component Value Date    HDL 57 12/11/2015    HDL 70 (H) 10/30/2014            Lab Results   Component Value Date    LDLCALC 59 12/11/2015    LDLCALC 77 10/30/2014            Lab Results   Component Value Date    LABVLDL 23 12/11/2015    LABVLDL 17 10/30/2014         CARDIAC DATA   EKG: (reviewed)  10/21/2014 Sinus beba, possible old anterior/septal infarct. 3/27/2015 Sinus rhythm with bigeminy PVC, old anterior infarct pattern,    Holter results: 4/1/15  Holter reviewed. Significant multifocal ectopy. >15% PVC burden, <1% atrial ectopy. Resting beba heart rate. At night, most ectopy especially ventricular ectopy had become quiet. Labs ok    6/17/2019 NSR with PRWP    ECHO 6/22/18:  Normal left ventricle systolic function with an estimated ejection fraction   of 55%. No regional wall motion abnormalities are seen. Grade I diastolic dysfunction with normal filing pressure. The non-coronary cusp of the aortic valve is thickened/calcified with   decreased leaflet mobility. No aortic stenosis noted. Mild pulmonic regurgitation. Mild tricuspid regurgitation. Systolic pulmonary artery pressure (SPAP) is normal and estimated at 24 mmHg   (right atrial pressure 3 mmHg). LV Diastolic Dimension: 4.4 cm LV Systolic Dimension: 6.67 cm   LV Septum Diastolic: 0.7 cm   LV PW Diastolic: 4.95 cm       AO Root Dimension: 2.6 cm                                  AV Cusp Separation: 1.3 cm                                  LA Dimension: 2.7 cm   LVOT: 2.1 cm                   LA Area: 13.4 cm2                                  LA volume/Index: 36.33 ml /23 ml/m2     STRESS TEST: 4/3/13  Lexiscan   Findings- Myocardial perfusion is normal at both stress and rest.   Left ventricular cavity size is normal and wall motion is uniform. The estimated left ventricular ejection fraction is 59%       VASCULAR/OTHER IMAGING:  Carotid 1/15/13  No significant plaque or flow limiting internal carotid artery   stenosis       CATH lab 10/30/2014  LM, LAD, LCX, RCA with no significant CAD (RCA with <10%)  Significant kinking and tortousity of vessel  LVEDP 5  LVEF 65%    PLAN  1. No significant CAD (only <10% in prox RCA)  2. CP likley from HTn and stress, possibly from coronary kinking. VIOLA: 11/16/15  Summary    There is no arterial insufficiency in the lower extremities bilaterally at  rest.    VL LE: 11/16/15  Summary    1. There is no evidence of deep or superficial venous thrombosis seen  involving the lower extremities bilaterally. 2. There is no evidence of deep or superficial venous insufficiency  involving the lower extremities bilaterally. Assessment and Plan   Sierra Fuentes is a 77 y.o. female who presents today for the following problems:      1. CAD: cath showed <10% in 2014, very minimal CAD.  No  CP     - some CP returning, aatypical 2. PVCs: Old Holter reviewed. Significant multifocal ectopy. >15% PVC burden, <1% atrial ectopy. With medication--> new holter 12/8/2015 with <0.1% burden              -  states very minimal except with stress  3. Dizziness: occasionally with BB  4. Hypertension: controlled  5. Aortic sclerosis: no issues, will follow      Plan:  1. Some atypical CP, given hx of DM will get Wandy-myoview (pt unable to gxt due to hips)  2. Ok to proceed with colonoscopy      QUALITY MEASURES  1. Tobacco Cessation Counseling: NA  2. Retake of BP if >140/90:   NA  3. Documentation to PCP/referring for new patient:  Sent to PCP at close of office visit  4. CAD patient on anti-platelet: Yes  5. CAD patient on STATIN therapy:  Yes  6. Patient with CHF and aFib on anticoagulation:  NA     Scribe's attestation: This note was scribed in the presence of Dr. Osman Troncoso by Raheel Hewitt, GINA    It is a pleasure to assist in the care of Guerrero Aguilar. Please call with any questions.       Osman Troncoso MD, [address] [facility] Cardiologist  [address]  [phone] [facility]  [phone] [address] [place] [address]  6/17/2019  8:21 AM

## 2019-06-19 NOTE — TELEPHONE ENCOUNTER
Pt is requesting for a refill on her Magnesium Oxide. Pt saw Hans Persons on 6-17-19. Please advise, thank you.  Inga in [facility]

## 2019-06-25 ENCOUNTER — TELEPHONE (OUTPATIENT)
Dept: CARDIOLOGY CLINIC | Age: 69
End: 2019-06-25

## 2019-06-25 NOTE — TELEPHONE ENCOUNTER
Pt stated her previous insurance dropped her and she cannot get the magnesium oxide anymore. She is requesting an alternative medication. She is requesting to have a call back at [phone].      Pt uses [email or web address] [id number] - [facility] [address] [phone] - F [phone]

## 2019-06-25 NOTE — TELEPHONE ENCOUNTER
Mag oxide should be over the counter or pharmacist should be able to help with what to choose and dosing

## 2019-08-27 RX ORDER — METOPROLOL TARTRATE 50 MG/1
TABLET, FILM COATED ORAL
Qty: 180 TABLET | Refills: 3 | Status: SHIPPED | OUTPATIENT
Start: 2019-08-27 | End: 2020-08-28

## 2020-08-28 RX ORDER — METOPROLOL TARTRATE 50 MG/1
TABLET, FILM COATED ORAL
Qty: 180 TABLET | Refills: 1 | Status: ON HOLD | OUTPATIENT
Start: 2020-08-28 | End: 2021-09-28 | Stop reason: HOSPADM

## 2021-09-19 ENCOUNTER — APPOINTMENT (OUTPATIENT)
Dept: GENERAL RADIOLOGY | Age: 71
DRG: 871 | End: 2021-09-19
Payer: MEDICARE

## 2021-09-19 ENCOUNTER — HOSPITAL ENCOUNTER (INPATIENT)
Age: 71
LOS: 9 days | Discharge: HOME HEALTH | DRG: 871 | End: 2021-09-28
Attending: EMERGENCY MEDICINE | Admitting: INTERNAL MEDICINE
Payer: MEDICARE

## 2021-09-19 DIAGNOSIS — N17.9 AKI (ACUTE KIDNEY INJURY) (HCC): Primary | ICD-10-CM

## 2021-09-19 DIAGNOSIS — R06.02 SHORTNESS OF BREATH: ICD-10-CM

## 2021-09-19 DIAGNOSIS — R07.9 CHEST PAIN, UNSPECIFIED TYPE: ICD-10-CM

## 2021-09-19 PROBLEM — J18.9 PNA (PNEUMONIA): Status: ACTIVE | Noted: 2021-09-19

## 2021-09-19 PROBLEM — J18.9 PNEUMONIA: Status: ACTIVE | Noted: 2021-09-19

## 2021-09-19 LAB
A/G RATIO: 1.2 (ref 1.1–2.2)
ALBUMIN SERPL-MCNC: 3.7 G/DL (ref 3.4–5)
ALP BLD-CCNC: 58 U/L (ref 40–129)
ALT SERPL-CCNC: 44 U/L (ref 10–40)
ANION GAP SERPL CALCULATED.3IONS-SCNC: 15 MMOL/L (ref 3–16)
AST SERPL-CCNC: 47 U/L (ref 15–37)
BASOPHILS ABSOLUTE: 0 K/UL (ref 0–0.2)
BASOPHILS RELATIVE PERCENT: 0.4 %
BILIRUB SERPL-MCNC: 0.7 MG/DL (ref 0–1)
BILIRUBIN URINE: NEGATIVE
BLOOD, URINE: ABNORMAL
BUN BLDV-MCNC: 38 MG/DL (ref 7–20)
CALCIUM SERPL-MCNC: 8.9 MG/DL (ref 8.3–10.6)
CHLORIDE BLD-SCNC: 96 MMOL/L (ref 99–110)
CLARITY: CLEAR
CO2: 19 MMOL/L (ref 21–32)
COLOR: YELLOW
CREAT SERPL-MCNC: 1.4 MG/DL (ref 0.6–1.2)
EKG ATRIAL RATE: 77 BPM
EKG DIAGNOSIS: NORMAL
EKG P AXIS: 59 DEGREES
EKG P-R INTERVAL: 146 MS
EKG Q-T INTERVAL: 430 MS
EKG QRS DURATION: 122 MS
EKG QTC CALCULATION (BAZETT): 486 MS
EKG R AXIS: -29 DEGREES
EKG T AXIS: 79 DEGREES
EKG VENTRICULAR RATE: 77 BPM
EOSINOPHILS ABSOLUTE: 0 K/UL (ref 0–0.6)
EOSINOPHILS RELATIVE PERCENT: 0 %
EPITHELIAL CELLS, UA: NORMAL /HPF (ref 0–5)
GFR AFRICAN AMERICAN: 45
GFR NON-AFRICAN AMERICAN: 37
GLOBULIN: 3.1 G/DL
GLUCOSE BLD-MCNC: 113 MG/DL (ref 70–99)
GLUCOSE BLD-MCNC: 127 MG/DL (ref 70–99)
GLUCOSE BLD-MCNC: 173 MG/DL (ref 70–99)
GLUCOSE URINE: >=1000 MG/DL
HCT VFR BLD CALC: 34.7 % (ref 36–48)
HEMOGLOBIN: 11 G/DL (ref 12–16)
KETONES, URINE: 15 MG/DL
LEUKOCYTE ESTERASE, URINE: NEGATIVE
LYMPHOCYTES ABSOLUTE: 0.5 K/UL (ref 1–5.1)
LYMPHOCYTES RELATIVE PERCENT: 8.9 %
MCH RBC QN AUTO: 28.4 PG (ref 26–34)
MCHC RBC AUTO-ENTMCNC: 31.6 G/DL (ref 31–36)
MCV RBC AUTO: 89.8 FL (ref 80–100)
MICROSCOPIC EXAMINATION: YES
MONOCYTES ABSOLUTE: 0.4 K/UL (ref 0–1.3)
MONOCYTES RELATIVE PERCENT: 6.6 %
NEUTROPHILS ABSOLUTE: 4.9 K/UL (ref 1.7–7.7)
NEUTROPHILS RELATIVE PERCENT: 84.1 %
NITRITE, URINE: NEGATIVE
PDW BLD-RTO: 24 % (ref 12.4–15.4)
PERFORMED ON: ABNORMAL
PERFORMED ON: ABNORMAL
PH UA: 5.5 (ref 5–8)
PLATELET # BLD: 107 K/UL (ref 135–450)
PMV BLD AUTO: 8.1 FL (ref 5–10.5)
POTASSIUM REFLEX MAGNESIUM: 5.1 MMOL/L (ref 3.5–5.1)
PROTEIN UA: NEGATIVE MG/DL
RBC # BLD: 3.87 M/UL (ref 4–5.2)
RBC UA: NORMAL /HPF (ref 0–4)
SARS-COV-2, NAAT: NOT DETECTED
SODIUM BLD-SCNC: 130 MMOL/L (ref 136–145)
SPECIFIC GRAVITY UA: 1.01 (ref 1–1.03)
TOTAL PROTEIN: 6.8 G/DL (ref 6.4–8.2)
TROPONIN: <0.01 NG/ML
URINE REFLEX TO CULTURE: ABNORMAL
URINE TYPE: ABNORMAL
UROBILINOGEN, URINE: 0.2 E.U./DL
WBC # BLD: 5.8 K/UL (ref 4–11)
WBC UA: NORMAL /HPF (ref 0–5)

## 2021-09-19 PROCEDURE — 85025 COMPLETE CBC W/AUTO DIFF WBC: CPT

## 2021-09-19 PROCEDURE — 2580000003 HC RX 258: Performed by: PHYSICIAN ASSISTANT

## 2021-09-19 PROCEDURE — 6370000000 HC RX 637 (ALT 250 FOR IP): Performed by: NURSE PRACTITIONER

## 2021-09-19 PROCEDURE — 93010 ELECTROCARDIOGRAM REPORT: CPT | Performed by: INTERNAL MEDICINE

## 2021-09-19 PROCEDURE — 81001 URINALYSIS AUTO W/SCOPE: CPT

## 2021-09-19 PROCEDURE — 80053 COMPREHEN METABOLIC PANEL: CPT

## 2021-09-19 PROCEDURE — 87635 SARS-COV-2 COVID-19 AMP PRB: CPT

## 2021-09-19 PROCEDURE — 96361 HYDRATE IV INFUSION ADD-ON: CPT

## 2021-09-19 PROCEDURE — U0005 INFEC AGEN DETEC AMPLI PROBE: HCPCS

## 2021-09-19 PROCEDURE — 2580000003 HC RX 258: Performed by: NURSE PRACTITIONER

## 2021-09-19 PROCEDURE — 1200000000 HC SEMI PRIVATE

## 2021-09-19 PROCEDURE — 83036 HEMOGLOBIN GLYCOSYLATED A1C: CPT

## 2021-09-19 PROCEDURE — 6360000002 HC RX W HCPCS: Performed by: NURSE PRACTITIONER

## 2021-09-19 PROCEDURE — 99284 EMERGENCY DEPT VISIT MOD MDM: CPT

## 2021-09-19 PROCEDURE — 36415 COLL VENOUS BLD VENIPUNCTURE: CPT

## 2021-09-19 PROCEDURE — 84484 ASSAY OF TROPONIN QUANT: CPT

## 2021-09-19 PROCEDURE — 96360 HYDRATION IV INFUSION INIT: CPT

## 2021-09-19 PROCEDURE — 93005 ELECTROCARDIOGRAM TRACING: CPT | Performed by: PHYSICIAN ASSISTANT

## 2021-09-19 PROCEDURE — U0003 INFECTIOUS AGENT DETECTION BY NUCLEIC ACID (DNA OR RNA); SEVERE ACUTE RESPIRATORY SYNDROME CORONAVIRUS 2 (SARS-COV-2) (CORONAVIRUS DISEASE [COVID-19]), AMPLIFIED PROBE TECHNIQUE, MAKING USE OF HIGH THROUGHPUT TECHNOLOGIES AS DESCRIBED BY CMS-2020-01-R: HCPCS

## 2021-09-19 PROCEDURE — 73502 X-RAY EXAM HIP UNI 2-3 VIEWS: CPT

## 2021-09-19 PROCEDURE — 71045 X-RAY EXAM CHEST 1 VIEW: CPT

## 2021-09-19 RX ORDER — NICOTINE POLACRILEX 4 MG
15 LOZENGE BUCCAL PRN
Status: DISCONTINUED | OUTPATIENT
Start: 2021-09-19 | End: 2021-09-28 | Stop reason: HOSPADM

## 2021-09-19 RX ORDER — FAMOTIDINE 20 MG/1
20 TABLET, FILM COATED ORAL 2 TIMES DAILY
Status: DISCONTINUED | OUTPATIENT
Start: 2021-09-19 | End: 2021-09-28 | Stop reason: HOSPADM

## 2021-09-19 RX ORDER — ONDANSETRON 4 MG/1
4 TABLET, ORALLY DISINTEGRATING ORAL EVERY 8 HOURS PRN
Status: DISCONTINUED | OUTPATIENT
Start: 2021-09-19 | End: 2021-09-28 | Stop reason: HOSPADM

## 2021-09-19 RX ORDER — ACETAMINOPHEN 650 MG/1
650 SUPPOSITORY RECTAL EVERY 6 HOURS PRN
Status: DISCONTINUED | OUTPATIENT
Start: 2021-09-19 | End: 2021-09-22 | Stop reason: SDUPTHER

## 2021-09-19 RX ORDER — SODIUM CHLORIDE 9 MG/ML
INJECTION, SOLUTION INTRAVENOUS CONTINUOUS
Status: DISCONTINUED | OUTPATIENT
Start: 2021-09-19 | End: 2021-09-20

## 2021-09-19 RX ORDER — VITAMIN B COMPLEX
2000 TABLET ORAL DAILY
Status: DISCONTINUED | OUTPATIENT
Start: 2021-09-19 | End: 2021-09-28 | Stop reason: HOSPADM

## 2021-09-19 RX ORDER — GUAIFENESIN/DEXTROMETHORPHAN 100-10MG/5
5 SYRUP ORAL EVERY 4 HOURS PRN
Status: DISCONTINUED | OUTPATIENT
Start: 2021-09-19 | End: 2021-09-28 | Stop reason: HOSPADM

## 2021-09-19 RX ORDER — MECOBALAMIN 5000 MCG
5 TABLET,DISINTEGRATING ORAL NIGHTLY
Status: DISCONTINUED | OUTPATIENT
Start: 2021-09-19 | End: 2021-09-28 | Stop reason: HOSPADM

## 2021-09-19 RX ORDER — METHYLPREDNISOLONE SODIUM SUCCINATE 40 MG/ML
40 INJECTION, POWDER, LYOPHILIZED, FOR SOLUTION INTRAMUSCULAR; INTRAVENOUS EVERY 12 HOURS
Status: DISCONTINUED | OUTPATIENT
Start: 2021-09-19 | End: 2021-09-20

## 2021-09-19 RX ORDER — ONDANSETRON 2 MG/ML
4 INJECTION INTRAMUSCULAR; INTRAVENOUS EVERY 6 HOURS PRN
Status: DISCONTINUED | OUTPATIENT
Start: 2021-09-19 | End: 2021-09-22 | Stop reason: SDUPTHER

## 2021-09-19 RX ORDER — ACETAMINOPHEN 325 MG/1
650 TABLET ORAL EVERY 6 HOURS PRN
Status: DISCONTINUED | OUTPATIENT
Start: 2021-09-19 | End: 2021-09-22 | Stop reason: SDUPTHER

## 2021-09-19 RX ORDER — CYCLOBENZAPRINE HCL 10 MG
10 TABLET ORAL 3 TIMES DAILY PRN
Status: DISCONTINUED | OUTPATIENT
Start: 2021-09-19 | End: 2021-09-28 | Stop reason: HOSPADM

## 2021-09-19 RX ORDER — SODIUM CHLORIDE 9 MG/ML
25 INJECTION, SOLUTION INTRAVENOUS PRN
Status: DISCONTINUED | OUTPATIENT
Start: 2021-09-19 | End: 2021-09-28 | Stop reason: HOSPADM

## 2021-09-19 RX ORDER — ASPIRIN 81 MG/1
324 TABLET, CHEWABLE ORAL ONCE
Status: DISCONTINUED | OUTPATIENT
Start: 2021-09-19 | End: 2021-09-28 | Stop reason: HOSPADM

## 2021-09-19 RX ORDER — SODIUM CHLORIDE 0.9 % (FLUSH) 0.9 %
5-40 SYRINGE (ML) INJECTION EVERY 12 HOURS SCHEDULED
Status: DISCONTINUED | OUTPATIENT
Start: 2021-09-19 | End: 2021-09-22 | Stop reason: SDUPTHER

## 2021-09-19 RX ORDER — DEXTROSE MONOHYDRATE 25 G/50ML
12.5 INJECTION, SOLUTION INTRAVENOUS PRN
Status: DISCONTINUED | OUTPATIENT
Start: 2021-09-19 | End: 2021-09-28 | Stop reason: HOSPADM

## 2021-09-19 RX ORDER — ONDANSETRON 4 MG/1
4 TABLET, ORALLY DISINTEGRATING ORAL EVERY 8 HOURS PRN
Status: DISCONTINUED | OUTPATIENT
Start: 2021-09-19 | End: 2021-09-22 | Stop reason: SDUPTHER

## 2021-09-19 RX ORDER — LEVOFLOXACIN 5 MG/ML
500 INJECTION, SOLUTION INTRAVENOUS EVERY 24 HOURS
Status: DISCONTINUED | OUTPATIENT
Start: 2021-09-19 | End: 2021-09-20

## 2021-09-19 RX ORDER — POLYETHYLENE GLYCOL 3350 17 G/17G
17 POWDER, FOR SOLUTION ORAL DAILY PRN
Status: DISCONTINUED | OUTPATIENT
Start: 2021-09-19 | End: 2021-09-22 | Stop reason: SDUPTHER

## 2021-09-19 RX ORDER — ACETAMINOPHEN 650 MG/1
650 SUPPOSITORY RECTAL EVERY 6 HOURS PRN
Status: DISCONTINUED | OUTPATIENT
Start: 2021-09-19 | End: 2021-09-28 | Stop reason: HOSPADM

## 2021-09-19 RX ORDER — ONDANSETRON 2 MG/ML
4 INJECTION INTRAMUSCULAR; INTRAVENOUS EVERY 6 HOURS PRN
Status: DISCONTINUED | OUTPATIENT
Start: 2021-09-19 | End: 2021-09-28 | Stop reason: HOSPADM

## 2021-09-19 RX ORDER — SODIUM CHLORIDE 0.9 % (FLUSH) 0.9 %
5-40 SYRINGE (ML) INJECTION EVERY 12 HOURS SCHEDULED
Status: DISCONTINUED | OUTPATIENT
Start: 2021-09-19 | End: 2021-09-28 | Stop reason: HOSPADM

## 2021-09-19 RX ORDER — ATORVASTATIN CALCIUM 10 MG/1
40 TABLET, FILM COATED ORAL NIGHTLY
Status: DISCONTINUED | OUTPATIENT
Start: 2021-09-19 | End: 2021-09-28 | Stop reason: HOSPADM

## 2021-09-19 RX ORDER — SODIUM CHLORIDE 0.9 % (FLUSH) 0.9 %
5-40 SYRINGE (ML) INJECTION PRN
Status: DISCONTINUED | OUTPATIENT
Start: 2021-09-19 | End: 2021-09-28 | Stop reason: HOSPADM

## 2021-09-19 RX ORDER — POLYETHYLENE GLYCOL 3350 17 G/17G
17 POWDER, FOR SOLUTION ORAL DAILY PRN
Status: DISCONTINUED | OUTPATIENT
Start: 2021-09-19 | End: 2021-09-28 | Stop reason: HOSPADM

## 2021-09-19 RX ORDER — ASPIRIN 81 MG/1
81 TABLET, CHEWABLE ORAL DAILY
Status: DISCONTINUED | OUTPATIENT
Start: 2021-09-20 | End: 2021-09-28 | Stop reason: HOSPADM

## 2021-09-19 RX ORDER — ACETAMINOPHEN 325 MG/1
650 TABLET ORAL EVERY 6 HOURS PRN
Status: DISCONTINUED | OUTPATIENT
Start: 2021-09-19 | End: 2021-09-28 | Stop reason: HOSPADM

## 2021-09-19 RX ORDER — DEXTROSE MONOHYDRATE 50 MG/ML
100 INJECTION, SOLUTION INTRAVENOUS PRN
Status: DISCONTINUED | OUTPATIENT
Start: 2021-09-19 | End: 2021-09-28 | Stop reason: HOSPADM

## 2021-09-19 RX ORDER — 0.9 % SODIUM CHLORIDE 0.9 %
1000 INTRAVENOUS SOLUTION INTRAVENOUS ONCE
Status: COMPLETED | OUTPATIENT
Start: 2021-09-19 | End: 2021-09-19

## 2021-09-19 RX ORDER — SODIUM CHLORIDE 9 MG/ML
25 INJECTION, SOLUTION INTRAVENOUS PRN
Status: DISCONTINUED | OUTPATIENT
Start: 2021-09-19 | End: 2021-09-22 | Stop reason: SDUPTHER

## 2021-09-19 RX ORDER — SODIUM CHLORIDE 0.9 % (FLUSH) 0.9 %
5-40 SYRINGE (ML) INJECTION PRN
Status: DISCONTINUED | OUTPATIENT
Start: 2021-09-19 | End: 2021-09-22 | Stop reason: SDUPTHER

## 2021-09-19 RX ADMIN — CYCLOBENZAPRINE HYDROCHLORIDE 10 MG: 10 TABLET, FILM COATED ORAL at 22:20

## 2021-09-19 RX ADMIN — FAMOTIDINE 20 MG: 20 TABLET ORAL at 20:48

## 2021-09-19 RX ADMIN — ACETAMINOPHEN 650 MG: 325 TABLET ORAL at 16:36

## 2021-09-19 RX ADMIN — SODIUM CHLORIDE: 9 INJECTION, SOLUTION INTRAVENOUS at 14:23

## 2021-09-19 RX ADMIN — ENOXAPARIN SODIUM 30 MG: 30 INJECTION SUBCUTANEOUS at 20:51

## 2021-09-19 RX ADMIN — Medication 2000 UNITS: at 17:38

## 2021-09-19 RX ADMIN — METHYLPREDNISOLONE SODIUM SUCCINATE 40 MG: 40 INJECTION, POWDER, FOR SOLUTION INTRAMUSCULAR; INTRAVENOUS at 17:38

## 2021-09-19 RX ADMIN — Medication 5 MG: at 22:20

## 2021-09-19 RX ADMIN — ATORVASTATIN CALCIUM 40 MG: 10 TABLET, FILM COATED ORAL at 20:48

## 2021-09-19 RX ADMIN — INSULIN LISPRO 1 UNITS: 100 INJECTION, SOLUTION INTRAVENOUS; SUBCUTANEOUS at 20:55

## 2021-09-19 RX ADMIN — LEVOFLOXACIN 500 MG: 5 INJECTION, SOLUTION INTRAVENOUS at 16:34

## 2021-09-19 RX ADMIN — SODIUM CHLORIDE 1000 ML: 9 INJECTION, SOLUTION INTRAVENOUS at 10:01

## 2021-09-19 ASSESSMENT — ENCOUNTER SYMPTOMS
NAUSEA: 0
VOMITING: 0
DIARRHEA: 0
BACK PAIN: 0
SHORTNESS OF BREATH: 1
COUGH: 0
CONSTIPATION: 0
RHINORRHEA: 1
ABDOMINAL PAIN: 0
EYE PAIN: 0
SORE THROAT: 0

## 2021-09-19 ASSESSMENT — PAIN DESCRIPTION - ORIENTATION: ORIENTATION: RIGHT

## 2021-09-19 ASSESSMENT — PAIN SCALES - GENERAL
PAINLEVEL_OUTOF10: 5
PAINLEVEL_OUTOF10: 5

## 2021-09-19 ASSESSMENT — PAIN DESCRIPTION - LOCATION: LOCATION: HIP

## 2021-09-19 ASSESSMENT — HEART SCORE: ECG: 1

## 2021-09-19 ASSESSMENT — PAIN DESCRIPTION - PAIN TYPE: TYPE: ACUTE PAIN

## 2021-09-19 NOTE — PROGRESS NOTES
Received page from ER regarding admission.   Forwarded to Dr. Qing Goddard NP, admitting hospitalist team.

## 2021-09-19 NOTE — H&P
Hospital Medicine History & Physical      PCP: Angi Coombs    Date of Admission: 9/19/2021    Date of Service: Pt seen/examined on 9/19/21 and Admitted to Inpatient with expected LOS greater than two midnights due to medical therapy. Chief Complaint: C/O concerns for COVID and mild chest pain       History Of Present Illness:      66 y.o. female who presented to [facility] with feelings of sob, cough, fatigue. Malaise  and mild chest pain. PT is vaccinated and her family has + covid currently. She is concerned that she is covid + as well. Pt found to have mild THEO, CXR demonstrated small left pna. VSS. No hypoxia   ECG demonstrated New LBBB     Pt will be admitted for further evalaution and treatment     Past Medical History:          Diagnosis Date    Arthritis     Asthma     Diabetes mellitus ([address]     type 2, takes PO meds    History of palpitations     Hyperlipidemia     Hypertension        Past Surgical History:          Procedure Laterality Date    CATARACT REMOVAL WITH IMPLANT Right 10/18/2017    entered to epic from h&P    CATARACT REMOVAL WITH IMPLANT Left 11/08/2017    CHOLECYSTECTOMY      COLONOSCOPY      CORONARY ANGIOPLASTY  10/30/14    CYST REMOVAL      from right wrist     ENDOSCOPY, COLON, DIAGNOSTIC      GASTRIC BYPASS SURGERY  2005    HERNIA REPAIR      HYSTERECTOMY      age 30    OTHER SURGICAL HISTORY  CYSTOSCOPY, RIGHT URETERAL STONE MANIPULATION WITH RIGHT    SHOULDER ARTHROPLASTY Left 12/15/2016    LEFT PROXIMAL HUMERUS OPEN REDUCTION INTERNAL FIXATION     TONSILLECTOMY         Medications Prior to Admission:      Prior to Admission medications    Medication Sig Start Date End Date Taking?  Authorizing Provider   magnesium oxide (MAGNESIUM-OXIDE) 400 (241.3 Mg) MG TABS tablet Take 1 tablet by mouth 2 times daily 6/28/19  Yes Merle Dancer, MD   MAGNESIUM-OXIDE 400 (241.3 Mg) MG TABS tablet TAKE 1 TABLET BY MOUTH TWICE DAILY 6/25/18  Yes Merle Dancer, MD   lisinopril-hydrochlorothiazide (PRINZIDE;ZESTORETIC) 20-12.5 MG per tablet Take 1 tablet by mouth daily   Yes Historical Provider, MD   VITAMIN D, CHOLECALCIFEROL, PO Take by mouth daily   Yes Historical Provider, MD   Dulaglutide (TRULICITY) 1.5 IX/6.5YC SOPN Inject into the skin once a week   Yes Historical Provider, MD   metFORMIN (GLUCOPHAGE) 500 MG tablet Take 1,000 mg by mouth 2 times daily (with meals). Yes Historical Provider, MD   albuterol (PROVENTIL HFA;VENTOLIN HFA) 108 (90 BASE) MCG/ACT inhaler Inhale 2 puffs into the lungs every 4 hours as needed. Yes Historical Provider, MD   metoprolol tartrate (LOPRESSOR) 50 MG tablet TAKE 1 TABLET BY MOUTH TWICE DAILY 8/28/20   Maurice Sanchez MD   levocetirizine (XYZAL) 5 MG tablet Take 5 mg by mouth nightly    Historical Provider, MD   Linagliptin (TRADJENTA PO) Take by mouth    Historical Provider, MD   Cyanocobalamin (VITAMIN B-12 PO) Take by mouth daily    Historical Provider, MD   Blood Pressure Monitoring (B-D ASSURE BPM/AUTO ARM CUFF) MISC For essential hypertension 11/11/15   Reyna Austin MD   gabapentin (NEURONTIN) 100 MG capsule Take 500 mg by mouth 4 times daily. She only takes at night because it makes her tired     Historical Provider, MD   tiZANidine (ZANAFLEX) 4 MG tablet Take 4 mg by mouth every 6 hours as needed. Historical Provider, MD   calcium carbonate (TUMS) 500 MG chewable tablet Take 1 tablet by mouth daily. Historical Provider, MD   guaiFENesin (MUCINEX) 600 MG SR tablet Take 1,200 mg by mouth 2 times daily as needed. Historical Provider, MD   simvastatin (ZOCOR) 20 MG tablet Take 20 mg by mouth nightly.     Historical Provider, MD       Allergies:  Morphine, Sulfamethoxazole, Trimethoprim, Alendronate, Bactrim [sulfamethoxazole-trimethoprim], Buspirone, Calcitonin (salmon), Demerol hcl [meperidine], Dust mite extract, Esomeprazole magnesium, Glipizide, Metformin, Mometasone, Omeprazole, Other, Oxycodone, Oxycodone-acetaminophen, Pcn [penicillins], Percocet [oxycodone-acetaminophen], Prednisone, Propoxyphene, Ranitidine, Sulfa antibiotics, Toradol [ketorolac tromethamine], Tramadol, and Zantac [ranitidine hcl]    Social History:        TOBACCO:   reports that she has never smoked. She has never used smokeless tobacco.  ETOH:   reports no history of alcohol use. E-Cigarettes/Vaping Use     Questions Responses    E-Cigarette/Vaping Use     Start Date     Passive Exposure     Quit Date     Counseling Given     Comments             Family History:     Reviewed in detail and negative for DM, CAD, Cancer, CVA. Positive as follows:        Problem Relation Age of Onset    Heart Attack Maternal Grandmother        REVIEW OF SYSTEMS COMPLETED:   Pertinent positives as noted in the HPI. All other systems reviewed and negative. PHYSICAL EXAM PERFORMED:    BP (!) 112/51   Pulse 75   Temp 98.9 °F (37.2 °C)   Resp 12   Ht 5' 3\" (1.6 m)   Wt 160 lb (72.6 kg)   SpO2 94%   BMI 28.34 kg/m²     General appearance:  No apparent distress, appears stated age and cooperative. HEENT:  Normal cephalic, atraumatic without obvious deformity. Pupils equal, round, and reactive to light. Extra ocular muscles intact. Conjunctivae/corneas clear. Neck: Supple, with full range of motion. No jugular venous distention. Trachea midline. Respiratory:  Normal respiratory effort. Clear to auscultation, bilaterally without Rales/Wheezes/Rhonchi. Decreased bilaterally   Cardiovascular:  Regular rate and rhythm with normal S1/S2 without murmurs, rubs or gallops. Abdomen: Soft, non-tender, non-distended with normal bowel sounds. Musculoskeletal:  No clubbing, cyanosis or edema bilaterally. Full range of motion without deformity. Skin: Skin color, texture, turgor normal.  No rashes or lesions. Neurologic:  Neurovascularly intact without any focal sensory/motor deficits.  Cranial nerves: II-XII intact, grossly non-focal.  Psychiatric:  Alert and oriented, thought content appropriate, normal insight  Capillary Refill: Brisk,3 seconds, normal  Peripheral Pulses: +2 palpable, equal bilaterally       Labs:     Recent Labs     09/19/21  0947   WBC 5.8   HGB 11.0*   HCT 34.7*   *     Recent Labs     09/19/21  0947   *   K 5.1   CL 96*   CO2 19*   BUN 38*   CREATININE 1.4*   CALCIUM 8.9     Recent Labs     09/19/21  0947   AST 47*   ALT 44*   BILITOT 0.7   ALKPHOS 58     No results for input(s): INR in the last 72 hours. Recent Labs     09/19/21  0947   TROPONINI <0.01       Urinalysis:      Lab Results   Component Value Date    NITRU Negative 09/19/2021    WBCUA 0-2 09/19/2021    RBCUA 0-2 09/19/2021    BLOODU TRACE-INTACT 09/19/2021    SPECGRAV 1.010 09/19/2021    GLUCOSEU >=1000 09/19/2021       Radiology:     CXR:   EKG:  Normal sinus rhythmLeft bundle branch blockAbnormal ECGWhen compared with ECG of 15-DEC-2016 08:03,Left bundle branch block is now PresentCriteria for Inferior infarct are no longer Present    XR CHEST PORTABLE   Final Result   Small left basilar pneumonia. XR HIP RIGHT (2-3 VIEWS)   Final Result   No acute finding of the right hip.              ASSESSMENT:    Active Hospital Problems    Diagnosis Date Noted    PNA (pneumonia) [J18.9] 09/19/2021    Coronary artery disease involving native coronary artery of native heart without angina pectoris [I25.10] 06/08/2016    Hypertension [I10] 10/21/2014    Hyperlipidemia [E78.5] 10/21/2014    Chest pain [R07.9] 10/21/2014         PLAN:    67 yo female vaccinated presents with mild chest pain, fatigue, sob, general aches and pains pt concerned for COVID     Possible COVID -23  - she is vaccinated but family is positive - 4 days ago started   - rapid COVID pending   - start supportive treatment with IVF and steroids and abx     PNA- right base, no WBC - perhaps viral or CAP   - cont IHBD, steroids and abx therapy     Asthma: exacerbation with above     THEO: pre renal, low volume, poor appetite last few days and was taking BP meds 9 ACE. HCTZ combo, hold for now     HTN: controlled: cont BB only for now     DM: reports controlled   - hold orals, SSI while IP, titrate as needed   - A1C pending     Mild chest pain- low suspicion of ACS, however does have CAD hx and new lBBB on ECG. Add asa and ask Cardiology to see for ischemic work up needs   - initial trop negtaiv, serial     HLD- cont statin     DVT Prophylaxis: Lovenox   Diet: ADULT DIET; Regular; No Caffeine  Code Status: Full Code    PT/OT Eval Status: ordered     Dispo - pending clinical course        Lamine Anthony, APRN - CNP    Thank you Macy Corral for the opportunity to be involved in this patient's care. If you have any questions or concerns please feel free to contact me at [phone].

## 2021-09-19 NOTE — ED NOTES
Pt walked to the bathroom, she did well. . Urine sample was collect. Urine is at bedside. Warm blankets and pillow was given. Call light was given to patient.       Cayetano Lot  09/19/21 0920

## 2021-09-19 NOTE — ED PROVIDER NOTES
I independently examined and evaluated Bharat Randhawa. All diagnostic, treatment, and disposition decisions were made by myself in conjunction with the WILLIAM, Kristian Espinozaide. For all further details of the patient's emergency department visit, please see their documentation. 64-year-old female presents with complaint of not feeling well for the past 4 days. She has had low-grade fevers, chills, shortness of breath and found out that her entire family also is a positive for Covid. She is vaccinated for COVID-19. She states she has had multiple falls over the past 2 days due to generalized weakness. Vitals:    09/19/21 1234   BP: (!) 112/51   Pulse: 75   Resp: 12   Temp:    SpO2: 94%     Here she is slightly frail-appearing. Heart is regular rate and rhythm, no respiratory distress.       Results for orders placed or performed during the hospital encounter of 09/19/21   COVID-19, Rapid    Specimen: Nasopharyngeal Swab   Result Value Ref Range    SARS-CoV-2, NAAT Not Detected Not Detected   CBC Auto Differential   Result Value Ref Range    WBC 5.8 4.0 - 11.0 K/uL    RBC 3.87 (L) 4.00 - 5.20 M/uL    Hemoglobin 11.0 (L) 12.0 - 16.0 g/dL    Hematocrit 34.7 (L) 36.0 - 48.0 %    MCV 89.8 80.0 - 100.0 fL    MCH 28.4 26.0 - 34.0 pg    MCHC 31.6 31.0 - 36.0 g/dL    RDW 24.0 (H) 12.4 - 15.4 %    Platelets 808 (L) 489 - 450 K/uL    MPV 8.1 5.0 - 10.5 fL    Neutrophils % 84.1 %    Lymphocytes % 8.9 %    Monocytes % 6.6 %    Eosinophils % 0.0 %    Basophils % 0.4 %    Neutrophils Absolute 4.9 1.7 - 7.7 K/uL    Lymphocytes Absolute 0.5 (L) 1.0 - 5.1 K/uL    Monocytes Absolute 0.4 0.0 - 1.3 K/uL    Eosinophils Absolute 0.0 0.0 - 0.6 K/uL    Basophils Absolute 0.0 0.0 - 0.2 K/uL   Comprehensive Metabolic Panel w/ Reflex to MG   Result Value Ref Range    Sodium 130 (L) 136 - 145 mmol/L    Potassium reflex Magnesium 5.1 3.5 - 5.1 mmol/L    Chloride 96 (L) 99 - 110 mmol/L    CO2 19 (L) 21 - 32 mmol/L    Anion Gap 15 3 - 16 Glucose 127 (H) 70 - 99 mg/dL    BUN 38 (H) 7 - 20 mg/dL    CREATININE 1.4 (H) 0.6 - 1.2 mg/dL    GFR Non- 37 (A) >60    GFR  45 (A) >60    Calcium 8.9 8.3 - 10.6 mg/dL    Total Protein 6.8 6.4 - 8.2 g/dL    Albumin 3.7 3.4 - 5.0 g/dL    Albumin/Globulin Ratio 1.2 1.1 - 2.2    Total Bilirubin 0.7 0.0 - 1.0 mg/dL    Alkaline Phosphatase 58 40 - 129 U/L    ALT 44 (H) 10 - 40 U/L    AST 47 (H) 15 - 37 U/L    Globulin 3.1 g/dL   Troponin   Result Value Ref Range    Troponin <0.01 <0.01 ng/mL   Urinalysis Reflex to Culture    Specimen: Urine, clean catch   Result Value Ref Range    Color, UA Yellow Straw/Yellow    Clarity, UA Clear Clear    Glucose, Ur >=1000 (A) Negative mg/dL    Bilirubin Urine Negative Negative    Ketones, Urine 15 (A) Negative mg/dL    Specific Gravity, UA 1.010 1.005 - 1.030    Blood, Urine TRACE-INTACT (A) Negative    pH, UA 5.5 5.0 - 8.0    Protein, UA Negative Negative mg/dL    Urobilinogen, Urine 0.2 <2.0 E.U./dL    Nitrite, Urine Negative Negative    Leukocyte Esterase, Urine Negative Negative    Microscopic Examination YES     Urine Type NotGiven     Urine Reflex to Culture Not Indicated    Microscopic Urinalysis   Result Value Ref Range    WBC, UA 0-2 0 - 5 /HPF    RBC, UA 0-2 0 - 4 /HPF    Epithelial Cells, UA 2-5 0 - 5 /HPF   EKG 12 Lead   Result Value Ref Range    Ventricular Rate 77 BPM    Atrial Rate 77 BPM    P-R Interval 146 ms    QRS Duration 122 ms    Q-T Interval 430 ms    QTc Calculation (Bazett) 486 ms    P Axis 59 degrees    R Axis -29 degrees    T Axis 79 degrees    Diagnosis       Normal sinus rhythmLeft bundle branch blockAbnormal ECGWhen compared with ECG of 15-DEC-2016 08:03,Left bundle branch block is now PresentCriteria for Inferior infarct are no longer Present         XR CHEST PORTABLE   Final Result   Small left basilar pneumonia. XR HIP RIGHT (2-3 VIEWS)   Final Result   No acute finding of the right hip.                EKG  The Ekg interpreted by myself  normal sinus rhythm with a rate of 77  Axis is   Normal  QTc is  normal  Left bundle branch block  No specific ST-T wave changes appreciated. No evidence of acute ischemia. Left bundle branch block does appear new when compared to the EKG from June 17, 2019. Your patient has a nonfocal exam.  Her rapid Covid test is negative but she does have an acute kidney injury on lab work. She is been given IV fluids here.   She will be admitted to the hospitalist.     Devonte Silva MD  09/24/21 1243

## 2021-09-19 NOTE — ED NOTES
Bed: 10  Expected date:   Expected time:   Means of arrival:   Comments:  Jaime Wolff, GINA  09/19/21 1806

## 2021-09-19 NOTE — PROGRESS NOTES
PT admitted to C3.  A&O, VSS, on room air. Pt educated on droplet isolation. Oriented to room and call light. Bed locked and in lowest position. Call light within reach. Will continue to monitor.

## 2021-09-19 NOTE — ED PROVIDER NOTES
[facility]  ED  EMERGENCY DEPARTMENT ENCOUNTER        Pt Name: Jeanette Adames  MRN: [id number]  Armstrongflorenza 1950  Date of evaluation: 9/19/2021  Provider: Hector Stauffer PA-C  PCP: Krish Gregorio  Note Started: 9:38 AM EDT       I have seen and evaluated this patient with my supervising physician Clement Cool MD.      [address]       Chief Complaint   Patient presents with    Shortness of Breath     patient c/o shortness of breath when she woke up this morning. states her family is COVID positive, patient has not been tested.  Hip Pain     multiple falls over the past three days. another fall this morning, right hip pain. 100mcg Fentanyl given by EMS in route. HISTORY OF PRESENT ILLNESS   (Location/Symptom, Timing/Onset, Context/Setting, Quality, Duration, Modifying Factors, Severity)  Note limiting factors. Chief Complaint: Shortness of breath    Jeanette Adames is a 66 y.o. female who presents to the emergency department stating that she has been not feeling well for the past 4 days, she has had fevers, chills, runny nose, shortness of breath, and found out that her entire family is positive with Covid. They are unvaccinated, and she is vaccinated. She also states that she has had multiple falls over the last 3 days, she had another fall this morning, and she now has pain to her right hip. She rates her pain level as 5/10. She also concurrently admits to some increased frequency and urgency of urination. Nothing seems to make her feel completely better. Nursing Notes were all reviewed and agreed with or any disagreements were addressed in the HPI. REVIEW OF SYSTEMS    (2-9 systems for level 4, 10 or more for level 5)     Review of Systems   Constitutional: Positive for chills and fever. Negative for diaphoresis. HENT: Positive for congestion and rhinorrhea. Negative for ear pain and sore throat.     Eyes: Negative for pain and visual disturbance. Respiratory: Positive for shortness of breath. Negative for cough. Cardiovascular: Positive for chest pain. Negative for palpitations and leg swelling. Gastrointestinal: Negative for abdominal pain, constipation, diarrhea, nausea and vomiting. Genitourinary: Positive for frequency and urgency. Negative for decreased urine volume and dysuria. Musculoskeletal: Negative for back pain and neck pain. Skin: Negative for rash and wound. Neurological: Negative for dizziness and light-headedness. PAST MEDICAL HISTORY     Past Medical History:   Diagnosis Date    Arthritis     Asthma     Diabetes mellitus ([address]     type 2, takes PO meds    History of palpitations     Hyperlipidemia     Hypertension        SURGICAL HISTORY     Past Surgical History:   Procedure Laterality Date    CATARACT REMOVAL WITH IMPLANT Right 10/18/2017    entered to epic from h&P    CATARACT REMOVAL WITH IMPLANT Left 11/08/2017    CHOLECYSTECTOMY      COLONOSCOPY      CORONARY ANGIOPLASTY  10/30/14    CYST REMOVAL      from right wrist     ENDOSCOPY, COLON, DIAGNOSTIC      GASTRIC BYPASS SURGERY  2005    HERNIA REPAIR      HYSTERECTOMY      age 30    OTHER SURGICAL HISTORY  CYSTOSCOPY, RIGHT URETERAL STONE MANIPULATION WITH RIGHT    SHOULDER ARTHROPLASTY Left 12/15/2016    LEFT PROXIMAL HUMERUS OPEN REDUCTION INTERNAL FIXATION     TONSILLECTOMY         CURRENTMEDICATIONS       Previous Medications    ALBUTEROL (PROVENTIL HFA;VENTOLIN HFA) 108 (90 BASE) MCG/ACT INHALER    Inhale 2 puffs into the lungs every 4 hours as needed. BLOOD PRESSURE MONITORING (B-D ASSURE BPM/AUTO ARM CUFF) MISC    For essential hypertension    CALCIUM CARBONATE (TUMS) 500 MG CHEWABLE TABLET    Take 1 tablet by mouth daily.     CYANOCOBALAMIN (VITAMIN B-12 PO)    Take by mouth daily    DULAGLUTIDE (TRULICITY) 1.5 OZ/1.7LD SOPN    Inject into the skin once a week    GABAPENTIN (NEURONTIN) 100 MG CAPSULE    Take 500 mg by mouth 4 times daily. She only takes at night because it makes her tired     GUAIFENESIN (MUCINEX) 600 MG SR TABLET    Take 1,200 mg by mouth 2 times daily as needed. LEVOCETIRIZINE (XYZAL) 5 MG TABLET    Take 5 mg by mouth nightly    LIDOCAINE (LIDODERM) 5 %    Place 1 patch onto the skin as needed for Pain. 12 hours on, 12 hours off. LINAGLIPTIN (TRADJENTA PO)    Take by mouth    LISINOPRIL-HYDROCHLOROTHIAZIDE (PRINZIDE;ZESTORETIC) 20-12.5 MG PER TABLET    Take 1 tablet by mouth daily    MAGNESIUM OXIDE (MAGNESIUM-OXIDE) 400 (241.3 MG) MG TABS TABLET    Take 1 tablet by mouth 2 times daily    MAGNESIUM-OXIDE 400 (241.3 MG) MG TABS TABLET    TAKE 1 TABLET BY MOUTH TWICE DAILY    METFORMIN (GLUCOPHAGE) 500 MG TABLET    Take 1,000 mg by mouth 2 times daily (with meals). METOPROLOL TARTRATE (LOPRESSOR) 50 MG TABLET    TAKE 1 TABLET BY MOUTH TWICE DAILY    SIMVASTATIN (ZOCOR) 20 MG TABLET    Take 20 mg by mouth nightly. TIZANIDINE (ZANAFLEX) 4 MG TABLET    Take 4 mg by mouth every 6 hours as needed.     VITAMIN D, CHOLECALCIFEROL, PO    Take by mouth daily       ALLERGIES     Morphine, Sulfamethoxazole, Trimethoprim, Alendronate, Bactrim [sulfamethoxazole-trimethoprim], Buspirone, Calcitonin (salmon), Demerol hcl [meperidine], Dust mite extract, Esomeprazole magnesium, Glipizide, Metformin, Mometasone, Omeprazole, Other, Oxycodone, Oxycodone-acetaminophen, Pcn [penicillins], Percocet [oxycodone-acetaminophen], Prednisone, Propoxyphene, Ranitidine, Sulfa antibiotics, Toradol [ketorolac tromethamine], Tramadol, and Zantac [ranitidine hcl]    FAMILYHISTORY       Family History   Problem Relation Age of Onset    Heart Attack Maternal Grandmother         SOCIAL HISTORY       Social History     Socioeconomic History    Marital status:      Spouse name: None    Number of children: None    Years of education: None    Highest education level: None   Occupational History    None   Tobacco Use    Smoking status: Never Smoker    Smokeless tobacco: Never Used   Substance and Sexual Activity    Alcohol use: No    Drug use: No    Sexual activity: Yes     Partners: Male   Other Topics Concern    None   Social History Narrative    None     Social Determinants of Health     Financial Resource Strain:     Difficulty of Paying Living Expenses:    Food Insecurity:     Worried About Running Out of Food in the Last Year:     Ran Out of Food in the Last Year:    Transportation Needs:     Lack of Transportation (Medical):  Lack of Transportation (Non-Medical):    Physical Activity:     Days of Exercise per Week:     Minutes of Exercise per Session:    Stress:     Feeling of Stress :    Social Connections:     Frequency of Communication with Friends and Family:     Frequency of Social Gatherings with Friends and Family:     Attends Druze Services:     Active Member of Clubs or Organizations:     Attends Club or Organization Meetings:     Marital Status:    Intimate Partner Violence:     Fear of Current or Ex-Partner:     Emotionally Abused:     Physically Abused:     Sexually Abused:        SCREENINGS      Heart Score for chest pain patients  History: Slightly Suspicious  ECG: Non-Specifc repolarization disturbance/LBTB/PM  Patient Age: > 65 years  *Risk factors for Atherosclerotic disease: Hypertension, Hypercholesterolemia, Positive family History  Risk Factors: > 3 Risk factors or history of atherosclerotic disease*  Troponin: < 1X normal limit  Heart Score Total: 5      PHYSICAL EXAM    (up to 7 for level 4, 8 or more for level 5)     ED Triage Vitals   BP Temp Temp src Pulse Resp SpO2 Height Weight   09/19/21 0911 09/19/21 0915 -- 09/19/21 0911 09/19/21 0911 09/19/21 0911 09/19/21 0909 09/19/21 0909   138/60 98.9 °F (37.2 °C)  81 17 98 % 5' 3\" (1.6 m) 160 lb (72.6 kg)       Physical Exam  Vitals and nursing note reviewed. Constitutional:       Appearance: Normal appearance.  She is well-developed. She is not ill-appearing or diaphoretic. HENT:      Head: Normocephalic and atraumatic. Right Ear: External ear normal.      Left Ear: External ear normal.      Nose: Nose normal.   Eyes:      General:         Right eye: No discharge. Left eye: No discharge. Cardiovascular:      Rate and Rhythm: Normal rate and regular rhythm. Heart sounds: Normal heart sounds. No murmur heard. No friction rub. No gallop. Pulmonary:      Effort: Pulmonary effort is normal. No respiratory distress. Breath sounds: Normal breath sounds. No stridor. No wheezing or rales. Chest:      Chest wall: No tenderness. Abdominal:      General: Bowel sounds are normal. There is no distension or abdominal bruit. Palpations: Abdomen is soft. Abdomen is not rigid. There is no mass or pulsatile mass. Tenderness: There is no abdominal tenderness. There is no guarding or rebound. Negative signs include Barrera's sign and McBurney's sign. Musculoskeletal:         General: Normal range of motion. Cervical back: Normal range of motion and neck supple. Skin:     General: Skin is warm and dry. Coloration: Skin is not pale. Neurological:      General: No focal deficit present. Mental Status: She is alert and oriented to person, place, and time.    Psychiatric:         Mood and Affect: Mood normal.         Behavior: Behavior normal.         DIAGNOSTIC RESULTS   LABS:    Labs Reviewed   CBC WITH AUTO DIFFERENTIAL - Abnormal; Notable for the following components:       Result Value    RBC 3.87 (*)     Hemoglobin 11.0 (*)     Hematocrit 34.7 (*)     RDW 24.0 (*)     Platelets 174 (*)     Lymphocytes Absolute 0.5 (*)     All other components within normal limits    Narrative:     Performed at:  [facility]) [address], [address] [place] [address] [place]   Phone [phone]   COMPREHENSIVE METABOLIC PANEL W/ REFLEX TO MG FOR LOW K - Abnormal; Notable for the following components:    Sodium 130 (*)     Chloride 96 (*)     CO2 19 (*)     Glucose 127 (*)     BUN 38 (*)     CREATININE 1.4 (*)     GFR Non- 37 (*)     GFR  45 (*)     ALT 44 (*)     AST 47 (*)     All other components within normal limits    Narrative:     Performed at:  [address], [facility]   Phone [phone]   URINE RT REFLEX TO CULTURE - Abnormal; Notable for the following components:    Glucose, Ur >=1000 (*)     Ketones, Urine 15 (*)     Blood, Urine TRACE-INTACT (*)     All other components within normal limits    Narrative:     Performed at:  [address], [facility]   Phone [phone], RAPID   TROPONIN    Narrative:     Performed at:  [address], [facility]   Phone [phone]   MICROSCOPIC URINALYSIS    Narrative:     Performed at:  [address], [address] [facility]   Phone [phone]       When ordered, only abnormal lab results are displayed. All other labs were within normal range or not returned as of this dictation. EKG: When ordered, EKG's are interpreted by the Emergency Department Physician in the absence of a cardiologist.  Please see their note for interpretation of EKG. RADIOLOGY:   Non-plain film images such as CT, Ultrasound and MRI are read by the radiologist. Plain radiographic images are visualized andpreliminarily interpreted by the  ED Provider with the below findings:        Interpretation [facility] Radiologist below, if available at the time of this note:    XR CHEST PORTABLE   Final Result   Small left basilar pneumonia. XR HIP RIGHT (2-3 VIEWS)   Final Result   No acute finding of the right hip. No results found.       PROCEDURES   Unless otherwise noted below, none     Procedures    CRITICAL CARE TIME   N/A    CONSULTS:  IP CONSULT TO HOSPITALIST      EMERGENCY DEPARTMENT COURSE and DIFFERENTIAL DIAGNOSIS/MDM:   Vitals:    Vitals:    09/19/21 0911 09/19/21 0915 09/19/21 1013 09/19/21 1204   BP: 138/60  (!) 127/57 (!) 130/58   Pulse: 81  81 80   Resp: 17 20 21   Temp:  98.9 °F (37.2 °C)     SpO2: 98%  98% 93%   Weight:       Height:           Patient was given thefollowing medications:  Medications   aspirin chewable tablet 324 mg (324 mg Oral Not Given 9/19/21 1009)   0.9 % sodium chloride bolus (1,000 mLs IntraVENous New Bag 9/19/21 1001)           Heart Score:  Heart Score  Heart Score for chest pain patients  History: Slightly Suspicious  ECG: Non-Specifc repolarization disturbance/LBTB/PM  Patient Age: > 65 years  *Risk factors for Atherosclerotic disease: Hypertension, Hypercholesterolemia, Positive family History  Risk Factors: > 3 Risk factors or history of atherosclerotic disease*  Troponin: < 1X normal limit  Heart Score Total: 5    Chest Pain:  The differential diagnosis of the chest pain includes Acute Coronary Syndrome, Stable and Unstable Angina, Gastro-Esophageal and musculoskeletal etiologies. I suspect the chest pain is some mild shortness of breath secondary to her constitutional symptoms. This patient has an THEO, she will need fluid replacement, and repeat troponins as well as repeat EKG. My suspicion for ACS is quite low however she may have Covid, we are currently awaiting her rapid Covid test however I believe that she needs to be admitted for fluid resuscitation and management of her THEO. FINAL IMPRESSION      1. THEO (acute kidney injury) ([facility] [address]    2. Shortness of breath    3. Chest pain, unspecified type          DISPOSITION/PLAN   DISPOSITION Decision To Admit 09/19/2021 11:54:27 AM      PATIENT REFERREDTO:  No follow-up provider specified.     DISCHARGE MEDICATIONS:  New Prescriptions    No medications on file       DISCONTINUED MEDICATIONS:  Discontinued Medications    No medications on file              (Please note that portions ofthis note were completed with a voice recognition program.  Efforts were made to edit the dictations but occasionally words are mis-transcribed.)    Hung Long PA-C (electronically signed)             Hung Long PA-C  09/19/21 0921

## 2021-09-19 NOTE — ED NOTES
Care transferred to [facility].       Whitley Blackburn RN  09/19/21 0341

## 2021-09-20 LAB
ANION GAP SERPL CALCULATED.3IONS-SCNC: 14 MMOL/L (ref 3–16)
ANION GAP SERPL CALCULATED.3IONS-SCNC: 17 MMOL/L (ref 3–16)
BASOPHILS ABSOLUTE: 0 K/UL (ref 0–0.2)
BASOPHILS RELATIVE PERCENT: 0.1 %
BUN BLDV-MCNC: 33 MG/DL (ref 7–20)
BUN BLDV-MCNC: 39 MG/DL (ref 7–20)
CALCIUM SERPL-MCNC: 8.2 MG/DL (ref 8.3–10.6)
CALCIUM SERPL-MCNC: 8.4 MG/DL (ref 8.3–10.6)
CHLORIDE BLD-SCNC: 102 MMOL/L (ref 99–110)
CHLORIDE BLD-SCNC: 103 MMOL/L (ref 99–110)
CO2: 15 MMOL/L (ref 21–32)
CO2: 18 MMOL/L (ref 21–32)
CREAT SERPL-MCNC: 1.2 MG/DL (ref 0.6–1.2)
CREAT SERPL-MCNC: 1.2 MG/DL (ref 0.6–1.2)
EKG ATRIAL RATE: 64 BPM
EKG DIAGNOSIS: NORMAL
EKG P AXIS: 54 DEGREES
EKG P-R INTERVAL: 160 MS
EKG Q-T INTERVAL: 472 MS
EKG QRS DURATION: 118 MS
EKG QTC CALCULATION (BAZETT): 486 MS
EKG R AXIS: -14 DEGREES
EKG T AXIS: 61 DEGREES
EKG VENTRICULAR RATE: 64 BPM
EOSINOPHILS ABSOLUTE: 0 K/UL (ref 0–0.6)
EOSINOPHILS RELATIVE PERCENT: 0 %
ESTIMATED AVERAGE GLUCOSE: 137 MG/DL
GFR AFRICAN AMERICAN: 53
GFR AFRICAN AMERICAN: 53
GFR NON-AFRICAN AMERICAN: 44
GFR NON-AFRICAN AMERICAN: 44
GLUCOSE BLD-MCNC: 142 MG/DL (ref 70–99)
GLUCOSE BLD-MCNC: 151 MG/DL (ref 70–99)
GLUCOSE BLD-MCNC: 193 MG/DL (ref 70–99)
GLUCOSE BLD-MCNC: 211 MG/DL (ref 70–99)
GLUCOSE BLD-MCNC: 323 MG/DL (ref 70–99)
GLUCOSE BLD-MCNC: 362 MG/DL (ref 70–99)
HBA1C MFR BLD: 6.4 %
HCT VFR BLD CALC: 35.9 % (ref 36–48)
HEMOGLOBIN: 11.1 G/DL (ref 12–16)
LACTIC ACID: 1.7 MMOL/L (ref 0.4–2)
LYMPHOCYTES ABSOLUTE: 0.5 K/UL (ref 1–5.1)
LYMPHOCYTES RELATIVE PERCENT: 12 %
MCH RBC QN AUTO: 28.3 PG (ref 26–34)
MCHC RBC AUTO-ENTMCNC: 30.8 G/DL (ref 31–36)
MCV RBC AUTO: 92 FL (ref 80–100)
MONOCYTES ABSOLUTE: 0.1 K/UL (ref 0–1.3)
MONOCYTES RELATIVE PERCENT: 2.1 %
NEUTROPHILS ABSOLUTE: 3.7 K/UL (ref 1.7–7.7)
NEUTROPHILS RELATIVE PERCENT: 85.8 %
PDW BLD-RTO: 24.1 % (ref 12.4–15.4)
PERFORMED ON: ABNORMAL
PLATELET # BLD: 106 K/UL (ref 135–450)
PMV BLD AUTO: 8.2 FL (ref 5–10.5)
POTASSIUM REFLEX MAGNESIUM: 4.9 MMOL/L (ref 3.5–5.1)
POTASSIUM REFLEX MAGNESIUM: 5.7 MMOL/L (ref 3.5–5.1)
RBC # BLD: 3.9 M/UL (ref 4–5.2)
SARS-COV-2: NOT DETECTED
SODIUM BLD-SCNC: 134 MMOL/L (ref 136–145)
SODIUM BLD-SCNC: 135 MMOL/L (ref 136–145)
WBC # BLD: 4.3 K/UL (ref 4–11)

## 2021-09-20 PROCEDURE — 97535 SELF CARE MNGMENT TRAINING: CPT

## 2021-09-20 PROCEDURE — 6370000000 HC RX 637 (ALT 250 FOR IP): Performed by: NURSE PRACTITIONER

## 2021-09-20 PROCEDURE — 83605 ASSAY OF LACTIC ACID: CPT

## 2021-09-20 PROCEDURE — 6370000000 HC RX 637 (ALT 250 FOR IP): Performed by: HOSPITALIST

## 2021-09-20 PROCEDURE — 93010 ELECTROCARDIOGRAM REPORT: CPT | Performed by: INTERNAL MEDICINE

## 2021-09-20 PROCEDURE — 93005 ELECTROCARDIOGRAM TRACING: CPT | Performed by: NURSE PRACTITIONER

## 2021-09-20 PROCEDURE — 6370000000 HC RX 637 (ALT 250 FOR IP): Performed by: INTERNAL MEDICINE

## 2021-09-20 PROCEDURE — 80048 BASIC METABOLIC PNL TOTAL CA: CPT

## 2021-09-20 PROCEDURE — 1200000000 HC SEMI PRIVATE

## 2021-09-20 PROCEDURE — 6360000002 HC RX W HCPCS: Performed by: INTERNAL MEDICINE

## 2021-09-20 PROCEDURE — 2580000003 HC RX 258: Performed by: HOSPITALIST

## 2021-09-20 PROCEDURE — 97165 OT EVAL LOW COMPLEX 30 MIN: CPT

## 2021-09-20 PROCEDURE — 6360000002 HC RX W HCPCS: Performed by: NURSE PRACTITIONER

## 2021-09-20 PROCEDURE — 2500000003 HC RX 250 WO HCPCS: Performed by: HOSPITALIST

## 2021-09-20 PROCEDURE — 99222 1ST HOSP IP/OBS MODERATE 55: CPT | Performed by: INTERNAL MEDICINE

## 2021-09-20 PROCEDURE — 2580000003 HC RX 258: Performed by: NURSE PRACTITIONER

## 2021-09-20 PROCEDURE — 36415 COLL VENOUS BLD VENIPUNCTURE: CPT

## 2021-09-20 PROCEDURE — 97161 PT EVAL LOW COMPLEX 20 MIN: CPT

## 2021-09-20 PROCEDURE — 97116 GAIT TRAINING THERAPY: CPT

## 2021-09-20 PROCEDURE — 85025 COMPLETE CBC W/AUTO DIFF WBC: CPT

## 2021-09-20 PROCEDURE — 99223 1ST HOSP IP/OBS HIGH 75: CPT | Performed by: HOSPITALIST

## 2021-09-20 RX ORDER — LEVOFLOXACIN 5 MG/ML
250 INJECTION, SOLUTION INTRAVENOUS EVERY 24 HOURS
Status: DISCONTINUED | OUTPATIENT
Start: 2021-09-20 | End: 2021-09-22

## 2021-09-20 RX ORDER — PREDNISONE 20 MG/1
40 TABLET ORAL DAILY
Status: DISCONTINUED | OUTPATIENT
Start: 2021-09-20 | End: 2021-09-20

## 2021-09-20 RX ORDER — SODIUM POLYSTYRENE SULFONATE 15 G/60ML
30 SUSPENSION ORAL; RECTAL ONCE
Status: COMPLETED | OUTPATIENT
Start: 2021-09-20 | End: 2021-09-20

## 2021-09-20 RX ADMIN — SODIUM CHLORIDE: 9 INJECTION, SOLUTION INTRAVENOUS at 01:22

## 2021-09-20 RX ADMIN — SODIUM CHLORIDE 25 ML: 9 INJECTION, SOLUTION INTRAVENOUS at 16:27

## 2021-09-20 RX ADMIN — MAGNESIUM GLUCONATE 500 MG ORAL TABLET 400 MG: 500 TABLET ORAL at 09:20

## 2021-09-20 RX ADMIN — INSULIN LISPRO 2 UNITS: 100 INJECTION, SOLUTION INTRAVENOUS; SUBCUTANEOUS at 09:25

## 2021-09-20 RX ADMIN — CYCLOBENZAPRINE HYDROCHLORIDE 10 MG: 10 TABLET, FILM COATED ORAL at 20:53

## 2021-09-20 RX ADMIN — ATORVASTATIN CALCIUM 40 MG: 10 TABLET, FILM COATED ORAL at 20:47

## 2021-09-20 RX ADMIN — Medication 5 MG: at 20:48

## 2021-09-20 RX ADMIN — INSULIN LISPRO 2 UNITS: 100 INJECTION, SOLUTION INTRAVENOUS; SUBCUTANEOUS at 12:36

## 2021-09-20 RX ADMIN — FAMOTIDINE 20 MG: 20 TABLET ORAL at 20:47

## 2021-09-20 RX ADMIN — ENOXAPARIN SODIUM 30 MG: 30 INJECTION SUBCUTANEOUS at 09:24

## 2021-09-20 RX ADMIN — SODIUM POLYSTYRENE SULFONATE 30 G: 15 SUSPENSION ORAL; RECTAL at 16:31

## 2021-09-20 RX ADMIN — SODIUM ZIRCONIUM CYCLOSILICATE 5 G: 5 POWDER, FOR SUSPENSION ORAL at 20:42

## 2021-09-20 RX ADMIN — LEVOFLOXACIN 250 MG: 5 INJECTION, SOLUTION INTRAVENOUS at 16:30

## 2021-09-20 RX ADMIN — Medication 2000 UNITS: at 09:20

## 2021-09-20 RX ADMIN — FAMOTIDINE 20 MG: 20 TABLET ORAL at 09:20

## 2021-09-20 RX ADMIN — SODIUM ZIRCONIUM CYCLOSILICATE 5 G: 5 POWDER, FOR SUSPENSION ORAL at 16:31

## 2021-09-20 RX ADMIN — INSULIN LISPRO 4 UNITS: 100 INJECTION, SOLUTION INTRAVENOUS; SUBCUTANEOUS at 20:49

## 2021-09-20 RX ADMIN — METHYLPREDNISOLONE SODIUM SUCCINATE 40 MG: 40 INJECTION, POWDER, FOR SOLUTION INTRAMUSCULAR; INTRAVENOUS at 05:04

## 2021-09-20 RX ADMIN — INSULIN LISPRO 4 UNITS: 100 INJECTION, SOLUTION INTRAVENOUS; SUBCUTANEOUS at 16:34

## 2021-09-20 RX ADMIN — MAGNESIUM GLUCONATE 500 MG ORAL TABLET 400 MG: 500 TABLET ORAL at 20:47

## 2021-09-20 RX ADMIN — SODIUM BICARBONATE: 84 INJECTION, SOLUTION INTRAVENOUS at 16:25

## 2021-09-20 NOTE — CARE COORDINATION
CASE MANAGEMENT INITIAL ASSESSMENT      Reviewed chart and completed assessment via telephone with:Pt at bedside  Explained Case Management role/services. yes    Primary contact information:    Health Care Decision Maker :   Primary Decision Maker: Josephine Villa - [place] - [phone]          Can this person be reached and be able to respond quickly, such as within a few minutes or hours? Yes  Who would be your back-up decision maker? Name   Phone Number:    Admit date/status:9/19 IP  Diagnosis: PNA  Is this a Readmission?:  No      Insurance:BCBS MCR  Precert required for SNF: Yes       3 night stay required: No    Living arrangements, Adls, care needs, prior to admission:Lives w dtr and her . No DME in use- no HHC.   IPTA- drives    Transportation:private    Durable Medical Equipment at home:  Walker__Cane__RTS__ BSC__Shower Chair_x_  02__ HHN__ CPAP__  BiPap__  Hospital Bed__ W/C___ Other__________    Services in the home and/or outpatient, prior to admission:None    ·     PT/OT recs:Home    Hospital Exemption Notification (HEN):NA    Barriers to discharge:None identified    Plan/comments:Plans home- denies needs    ECOC on chart for MD aris Sorto RN

## 2021-09-20 NOTE — PROGRESS NOTES
Pt alert and oriented. VSS, room air. Assessment completed as charted. Pt c/o right hip pain, medicated per MAR. Pt with standby assist to bathroom, still with c/o slight dizziness with ambulation. Pt resting in bed, denies further needs at present time. Call light and bedside table within reach. Bed locked and in lowest position. Bed alarm on and audible.

## 2021-09-20 NOTE — PROGRESS NOTES
Hospitalist Progress Note      PCP: Yony Prince    Date of Admission: 9/19/2021    Chief Complaint: C/O concerns for COVID and mild chest pain     Hospital Course:   66 y.o. female who presented to [facility] with feelings of sob, cough, fatigue. Malaise  and mild chest pain. PT is vaccinated and her family has + covid currently. She is concerned that she is covid + as well.      Pt found to have mild THEO, CXR demonstrated small left pna. VSS.  No hypoxia   ECG demonstrated New LBBB      Pt will be admitted for further evalaution and treatment     Subjective:   Feels better  No cough sputum shortness of breath chest pain fever or change in mental status  Appetite is better      Medications:  Reviewed    Infusion Medications    dextrose      sodium chloride      sodium chloride      sodium chloride 100 mL/hr at 09/20/21 0506     Scheduled Medications    aspirin  324 mg Oral Once    insulin lispro  0-12 Units SubCUTAneous TID WC    insulin lispro  0-6 Units SubCUTAneous Nightly    sodium chloride flush  5-40 mL IntraVENous 2 times per day    enoxaparin  30 mg SubCUTAneous BID    famotidine  20 mg Oral BID    methylPREDNISolone  40 mg IntraVENous Q12H    Vitamin D  2,000 Units Oral Daily    sodium chloride flush  5-40 mL IntraVENous 2 times per day    aspirin  81 mg Oral Daily    atorvastatin  40 mg Oral Nightly    levofloxacin  500 mg IntraVENous Q24H    melatonin  5 mg Oral Nightly     PRN Meds: glucose, dextrose, glucagon (rDNA), dextrose, sodium chloride flush, sodium chloride, ondansetron **OR** ondansetron, polyethylene glycol, acetaminophen **OR** acetaminophen, guaiFENesin-dextromethorphan, sodium chloride flush, sodium chloride, ondansetron **OR** ondansetron, acetaminophen **OR** acetaminophen, polyethylene glycol, cyclobenzaprine      Intake/Output Summary (Last 24 hours) at 9/20/2021 0752  Last data filed at 9/20/2021 0506  Gross per 24 hour   Intake 1652.37 ml   Output -- Assessment/Plan:    Active Hospital Problems    Diagnosis     PNA (pneumonia) [J18.9]     Pneumonia [J18.9]     Coronary artery disease involving native coronary artery of native heart without angina pectoris [I25.10]     Hypertension [I10]     Hyperlipidemia [E78.5]     Chest pain [R07.9]      67 yo female vaccinated presents with mild chest pain, fatigue, sob, general aches and pains pt concerned for COVID      COVID -19-ruled out rapid and PCR are negative  -Is asymptomatic     PNA-left base, -continue antibiotics short course, , no hypoxia    Asthma: Exacerbation-resolved change steroid to p.o. short course     THEO: pre renal, low volume, poor appetite last few days and was taking BP meds 9 ACE. HCTZ combo, hold for now   Repeat BMP pending     HTN: controlled: cont BB only for now      DM: reports controlled   - hold orals, SSI while IP, titrate as needed   - A1C pending      Mild chest pain- low suspicion of ACS, however does have CAD hx and new lBBB on ECG. Add asa and ask Cardiology to see for ischemic work up needs   - initial trop negtaiv, serial   Cardiology input appreciated  Advised outpatient echo and magnesium for PVC     HLD- cont statin     DVT Prophylaxis: Lovenox  Diet: ADULT DIET;  Regular; 4 carb choices (60 gm/meal)  Code Status: Full Code    PT/OT Eval Status: Home with home care    Dispo -today if okay with cardiology and repeat BMP is acceptable    Dee Warren MD

## 2021-09-20 NOTE — CONSULTS
Nephrology Consult Note                                                                                                                                                                                                                                                                                                                                                               Office : [phone]     Fax :[phone]              Patient's Name: Bouchra Weinberg  11:38 AM  9/21/2021    Reason for Consult:  Metabolic acidosis , hyperkalemia   Requesting Physician:  Gema Juarez      Chief Complaint:  Chest pain     History of Present Ilness:    Bouchra Weinberg is a 66 y.o. female with PMH of CAD  who presented to [facility] with feelings of sob, cough, fatigue. Malaise  and mild chest pain.       Pt found to have mild THEO, CXR demonstrated small left pna. VSS. No hypoxia   ECG demonstrated New LBBB       Nephrology consult for management of hyperkalemia and metabolic acidosis       Past Medical History:   Diagnosis Date    Arthritis     Asthma     Diabetes mellitus ([address]     type 2, takes PO meds    History of palpitations     Hyperlipidemia     Hypertension        Past Surgical History:   Procedure Laterality Date    CATARACT REMOVAL WITH IMPLANT Right 10/18/2017    entered to epic from h&P    CATARACT REMOVAL WITH IMPLANT Left 11/08/2017    CHOLECYSTECTOMY      COLONOSCOPY      CORONARY ANGIOPLASTY  10/30/14    CYST REMOVAL      from right wrist     ENDOSCOPY, COLON, DIAGNOSTIC      GASTRIC BYPASS SURGERY  2005    HERNIA REPAIR      HYSTERECTOMY      age 30    OTHER SURGICAL HISTORY  CYSTOSCOPY, RIGHT URETERAL STONE MANIPULATION WITH RIGHT    SHOULDER ARTHROPLASTY Left 12/15/2016    LEFT PROXIMAL HUMERUS OPEN REDUCTION INTERNAL FIXATION     TONSILLECTOMY         Family History   Problem Relation Age of Onset    Heart Attack Maternal Grandmother         reports that she has never smoked. She has never used smokeless tobacco. She reports that she does not drink alcohol and does not use drugs.     Allergies:  Morphine, Sulfamethoxazole, Trimethoprim, Alendronate, Bactrim [sulfamethoxazole-trimethoprim], Buspirone, Calcitonin (salmon), Demerol hcl [meperidine], Dust mite extract, Esomeprazole magnesium, Glipizide, Metformin, Mometasone, Omeprazole, Other, Oxycodone, Oxycodone-acetaminophen, Pcn [penicillins], Percocet [oxycodone-acetaminophen], Prednisone, Propoxyphene, Ranitidine, Sulfa antibiotics, Toradol [ketorolac tromethamine], Tramadol, and Zantac [ranitidine hcl]    Current Medications:    magnesium oxide (MAG-OX) tablet 400 mg, BID  levoFLOXacin (LEVAQUIN) 250 MG/50ML infusion 250 mg, Q24H  sodium bicarbonate 150 mEq in dextrose 5 % 1,000 mL infusion, Continuous  aspirin chewable tablet 324 mg, Once  glucose (GLUTOSE) 40 % oral gel 15 g, PRN  dextrose 50 % IV solution, PRN  glucagon (rDNA) injection 1 mg, PRN  dextrose 5 % solution, PRN  insulin lispro (HUMALOG) injection vial 0-12 Units, TID WC  insulin lispro (HUMALOG) injection vial 0-6 Units, Nightly  sodium chloride flush 0.9 % injection 5-40 mL, 2 times per day  sodium chloride flush 0.9 % injection 5-40 mL, PRN  0.9 % sodium chloride infusion, PRN  enoxaparin (LOVENOX) injection 30 mg, BID  ondansetron (ZOFRAN-ODT) disintegrating tablet 4 mg, Q8H PRN   Or  ondansetron (ZOFRAN) injection 4 mg, Q6H PRN  polyethylene glycol (GLYCOLAX) packet 17 g, Daily PRN  acetaminophen (TYLENOL) tablet 650 mg, Q6H PRN   Or  acetaminophen (TYLENOL) suppository 650 mg, Q6H PRN  famotidine (PEPCID) tablet 20 mg, BID  guaiFENesin-dextromethorphan (ROBITUSSIN DM) 100-10 MG/5ML syrup 5 mL, Q4H PRN  Vitamin D (CHOLECALCIFEROL) tablet 2,000 Units, Daily  sodium chloride flush 0.9 % injection 5-40 mL, 2 times per day  sodium chloride flush 0.9 % injection 5-40 mL, PRN  0.9 % sodium chloride infusion, PRN  ondansetron (ZOFRAN-ODT) disintegrating tablet 4 mg, Q8H PRN   Or  ondansetron (ZOFRAN) injection 4 mg, Q6H PRN  acetaminophen (TYLENOL) tablet 650 mg, Q6H PRN   Or  acetaminophen (TYLENOL) suppository 650 mg, Q6H PRN  polyethylene glycol (GLYCOLAX) packet 17 g, Daily PRN  aspirin chewable tablet 81 mg, Daily  atorvastatin (LIPITOR) tablet 40 mg, Nightly  melatonin disintegrating tablet 5 mg, Nightly  cyclobenzaprine (FLEXERIL) tablet 10 mg, TID PRN        Review of Systems:   14 point ROS obtained but were negative except mentioned in HPI      Physical exam:     Vitals:  /72   Pulse 71   Temp 98.3 °F (36.8 °C) (Oral)   Resp 16   Ht 5' 3\" (1.6 m)   Wt 158 lb 14.4 oz (72.1 kg)   SpO2 91%   BMI 28.15 kg/m²   Constitutional:  OAA X3 NAD  Skin: no rash, turgor wnl  Heent:  eomi, mmm  Neck: no bruits or jvd noted  Cardiovascular:  S1, S2 without m/r/g  Respiratory: CTA B without w/r/r  Abdomen:  +bs, soft, nt, nd  Ext: no  lower extremity edema  Psychiatric: mood and affect appropriate  Musculoskeletal:  Rom, muscular strength intact    Data:   Labs:  CBC:   Recent Labs     09/19/21  0947 09/20/21  0540   WBC 5.8 4.3   HGB 11.0* 11.1*   * 106*     BMP:    Recent Labs     09/19/21  0947 09/20/21  1300 09/20/21  1933   * 135* 134*   K 5.1 5.7* 4.9   CL 96* 103 102   CO2 19* 15* 18*   BUN 38* 33* 39*   CREATININE 1.4* 1.2 1.2   GLUCOSE 127* 193* 362*     Ca/Mg/Phos:   Recent Labs     09/19/21  0947 09/20/21  1300 09/20/21  1933   CALCIUM 8.9 8.4 8.2*     Hepatic:   Recent Labs     09/19/21  0947   AST 47*   ALT 44*   BILITOT 0.7   ALKPHOS 58     Troponin:   Recent Labs     09/19/21  0947 09/19/21  1424 09/19/21  1747   TROPONINI <0.01 <0.01 <0.01     BNP: No results for input(s): BNP in the last 72 hours. Lipids: No results for input(s): CHOL, TRIG, HDL, LDLCALC, LABVLDL in the last 72 hours. ABGs: No results for input(s): PHART, PO2ART, RQA2WNG in the last 72 hours. INR: No results for input(s): INR in the last 72 hours.   UA:  Recent Labs

## 2021-09-20 NOTE — PROGRESS NOTES
Physical Therapy    Facility/Department: [facility] C3 TELE/MED SURG/ONC  Initial Assessment/Treatment/Discharge Summary    NAME: Mervat Galvan  : 1950  MRN: [id number]    Date of Service: 2021    Discharge Recommendations:  Home with assist PRN   PT Equipment Recommendations  Equipment Needed: No    Assessment   Body structures, Functions, Activity limitations: Decreased functional mobility   Assessment: Pt presents to [facility] with shortness of breath, r/o COVID. PTA, pt lives with family and performs all functional mobility independently. Pt reports rapid decline in function within last few days, with increased falls and AMS. Currently, pt A&Ox4 and performs all functional mobility independently without AD. Pt appears to be at baseline for all mobility. No further PT needs at this time. Recommend home with prn assist  Treatment Diagnosis: impaired functional mobility  Prognosis: Excellent  Decision Making: Low Complexity  PT Education: PT Role;General Safety; Energy Conservation;Disease Specific Education  Patient Education: Pt edu on energy conservation, PLB and role of PT in acute care- pt verbalizes understanding  No Skilled PT: Independent with functional mobility   REQUIRES PT FOLLOW UP: No  Activity Tolerance  Activity Tolerance: Patient Tolerated treatment well  Activity Tolerance: SpO2 remains above 90% with all mobility on RA       Patient Diagnosis(es): The primary encounter diagnosis was THEO (acute kidney injury) ([facility] [address] Diagnoses of Shortness of breath and Chest pain, unspecified type were also pertinent to this visit. has a past medical history of Arthritis, Asthma, Diabetes mellitus ([facility] [address] History of palpitations, Hyperlipidemia, and Hypertension. has a past surgical history that includes Cholecystectomy; Gastric bypass surgery ();  Hysterectomy; hernia repair; cyst removal; Tonsillectomy; Colonoscopy; Endoscopy, colon, diagnostic; Coronary angioplasty (10/30/14); other surgical history (CYSTOSCOPY, RIGHT URETERAL STONE MANIPULATION WITH RIGHT); Total shoulder arthroplasty (Left, 12/15/2016); Cataract removal with implant (Right, 10/18/2017); and Cataract removal with implant (Left, 11/08/2017). Restrictions  Restrictions/Precautions  Restrictions/Precautions: Contact Precautions, Up as Tolerated  Position Activity Restriction  Other position/activity restrictions: IV, COVID r/o     Vision/Hearing  Vision: Impaired  Vision Exceptions: Wears glasses for reading  Hearing: Within functional limits       Subjective  General  Chart Reviewed: Yes  Patient assessed for rehabilitation services?: Yes  Response To Previous Treatment: Not applicable  Family / Caregiver Present: No  Referring Practitioner: JENN Rodriguez CNP  Referral Date : 09/19/21  Diagnosis: SOB  Follows Commands: Within Functional Limits  General Comment  Comments: RN cleared pt for therapy eval  Subjective  Subjective: Pt up in bathroom with PCA upon arrival, agreeable to PT eval  Pain Screening  Patient Currently in Pain: Denies  Vital Signs  Patient Currently in Pain: Denies       Orientation  Orientation  Overall Orientation Status: Within Normal Limits     Social/Functional History  Social/Functional History  Lives With: Family  Type of Home: House  Home Layout: Two level, Performs ADL's on one level, Able to Live on Main level with bedroom/bathroom  Home Access: Stairs to enter without rails  Entrance Stairs - Number of Steps: 2  Bathroom Shower/Tub: Walk-in shower  Bathroom Toilet: Standard  Bathroom Equipment: Shower chair  Home Equipment:  (no DME)  ADL Assistance: Independent  Homemaking Assistance: Independent  Ambulation Assistance: Independent  Transfer Assistance: Independent  Active : Yes  Additional Comments: Began to have falls 9/17/21 (1x in bathroom).  One fall 1 year ago    Objective  Strength RLE  Strength RLE: WFL  Strength LLE  Strength LLE: WFL     Sensation  Overall Sensation Status: WFL     Bed mobility  Comment: Not assess due to pt up with PCA prior to eval and pt in chair at end of session; per PCA, pt performed independently     Transfers  Sit to Stand: Independent  Stand to sit: Independent  Bed to Chair: Independent  Comment: No AD     Ambulation  Ambulation?: Yes  Ambulation 1  Surface: level tile  Device: No Device  Assistance: Independent  Quality of Gait: slow zander, overall steady  Distance: 50 ft  Comments: SpO2 remains >90% on RA during ambulation     Balance  Sitting - Static: Good  Standing - Static: Good  Standing - Dynamic: Good  Comments: IND for balance, no AD      Plan   Plan  Times per week: 1x only  Safety Devices  Type of devices:  All fall risk precautions in place, Left in chair, Nurse notified, Call light within reach, Chair alarm in place, Gait belt    AM-PAC Score  AM-PAC Inpatient Mobility Raw Score : 23 (09/20/21 1117)  AM-PAC Inpatient T-Scale Score : 56.93 (09/20/21 1117)  Mobility Inpatient CMS 0-100% Score: 11.2 (09/20/21 1117)  Mobility Inpatient CMS G-Code Modifier : CI (09/20/21 1117)        Goals  Short term goals  Time Frame for Short term goals: 1 session  Short term goal 1: Pt will perform transfers with LRAD and IND-- Goal Met 9/20  Patient Goals   Patient goals : \"to go home\"       Therapy Time   Individual Concurrent Group Co-treatment   Time In 0820         Time Out 0844         Minutes 24         Timed Code Treatment Minutes: 14 Minutes (10 min eval)       Tash Mclaughlin, PT

## 2021-09-20 NOTE — PLAN OF CARE
Problem: Falls - Risk of:  Goal: Will remain free from falls  Description: Will remain free from falls  Note: Pt remains safe. Uses call light appropriately to call out for assistance. Non skid footwear on. Bed locked in lowest position; side rails 2/4; call light and bedside table within reach of pt.

## 2021-09-20 NOTE — PROGRESS NOTES
Pt a/o. Pt stated no pain, no nausea. No emesis noted. Pt stated feeling better. Secure message sent to Dr. Gavi Doherty \"PT stated she will not take Prednisone and is definitely allergic. BMP also resulted: K 5.7, Anion Gap 17, GFR 44, BUN 33, CO2 15.\"    Call light within reach; will continue to monitor.

## 2021-09-20 NOTE — PROGRESS NOTES
Occupational Therapy   Occupational Therapy Initial Assessment and Treatment Note 1x  Date: 2021   Patient Name: Vannesa Aly  MRN: [id number]     : 1950    Date of Service: 2021    Discharge Recommendations:  Home with assist PRN     Assessment   Assessment: OT eval completed. Pt admitted for SOB, r/o COVID. She lived with family and was independent prior to onset of symptoms. She does report a decline in function at home prior to admission. During OT session, pt demonstrated independence with standing balance, functional mobility and self-care tasks. O2 sats were monitored during activity, remained >90% on RA. Balance was intact during tx. No further OT needed. Pt is in agreement. Decision Making: Low Complexity  OT Education: OT Role;Energy Conservation;IADL Safety  Disease Specific Education: Pt educated on importance of OOB mobility, prevention of complications of bedrest, and general safety during hospitalization. Pt verbalized understanding  REQUIRES OT FOLLOW UP: No  Activity Tolerance  Activity Tolerance: Patient Tolerated treatment well  Safety Devices  Safety Devices in place: Yes  Type of devices: Call light within reach; Chair alarm in place; Left in chair;Nurse notified         Patient Diagnosis(es): The primary encounter diagnosis was THEO (acute kidney injury) ([facility] [address] Diagnoses of Shortness of breath and Chest pain, unspecified type were also pertinent to this visit. has a past medical history of Arthritis, Asthma, Diabetes mellitus ([address] History of palpitations, Hyperlipidemia, and Hypertension. has a past surgical history that includes Cholecystectomy; Gastric bypass surgery (); Hysterectomy; hernia repair; cyst removal; Tonsillectomy; Colonoscopy; Endoscopy, colon, diagnostic; Coronary angioplasty (10/30/14); other surgical history (CYSTOSCOPY, RIGHT URETERAL STONE MANIPULATION WITH RIGHT); Total shoulder arthroplasty (Left, 12/15/2016);  Cataract removal with implant (Right, 10/18/2017); and Cataract removal with implant (Left, 11/08/2017). Restrictions  Restrictions/Precautions  Restrictions/Precautions: Contact Precautions, Up as Tolerated  Position Activity Restriction  Other position/activity restrictions: IV, COVID r/o    Subjective   General  Chart Reviewed: Yes  Patient assessed for rehabilitation services?: Yes  Referring Practitioner: TOYIN Evans  Diagnosis: SOB, COVID r/o  Subjective  Subjective: Pt agreeable to OT  General Comment  Comments: RN approved therapy  Patient Currently in Pain: Denies  Vital Signs  Temp: 98 °F (36.7 °C)  Temp Source: Oral  Pulse: 73  Heart Rate Source: Monitor  Resp: 20  BP: 125/68  BP Location: Left upper arm  MAP (mmHg): 87  Patient Position: Sitting  Level of Consciousness: Alert (0)  MEWS Score: 1  Patient Currently in Pain: Denies  Oxygen Therapy  SpO2: 96 %  Pulse Oximeter Device Mode: Intermittent  O2 Device: None (Room air)  Social/Functional History  Social/Functional History  Lives With: Family (Daughter, son in law and granddaughter)  Type of Home: House  Home Layout: Two level, Performs ADL's on one level, Able to Live on Main level with bedroom/bathroom (bedroom and bathroom on first floor)  Home Access: Stairs to enter without rails  Entrance Stairs - Number of Steps: 2  Bathroom Shower/Tub: Walk-in shower  Bathroom Toilet: Standard  Bathroom Equipment: Shower chair  Home Equipment:  (no DME)  ADL Assistance: Independent  Homemaking Assistance: Independent  Ambulation Assistance: Independent (no device)  Transfer Assistance: Independent  Active : Yes  Additional Comments: Began to have falls 9/17/21 (1x in bathroom).  One fall 1 year ago     Objective   Vision: Impaired  Vision Exceptions: Wears glasses for reading  Hearing: Within functional limits    Orientation  Overall Orientation Status: Within Functional Limits     Balance  Sitting Balance: Independent  Standing Balance: Independent  Standing Balance  Activity: Balance intact during dynamic activities (ie ADLs). Functional Mobility  Functional - Mobility Device: No device  Activity: To/from bathroom  Assist Level: Independent  Toilet Transfers  Toilet - Technique: Ambulating  Equipment Used: Standard toilet  Toilet Transfer: Independent  ADL  Feeding: Independent  Grooming: Independent  LE Dressing: Independent  Toileting: Independent  Tone RUE  RUE Tone: Normotonic  Tone LUE  LUE Tone: Normotonic  Coordination  Movements Are Fluid And Coordinated: Yes        Transfers  Stand Pivot Transfers: Independent  Sit to stand: Independent  Stand to sit:  Independent     Cognition  Overall Cognitive Status: WFL       LUE AROM (degrees)  LUE AROM : WFL  RUE AROM (degrees)  RUE AROM : WFL  LUE Strength  Gross LUE Strength: WFL  RUE Strength  Gross RUE Strength: WFL     AM-PAC Score   AM-PAC Inpatient Daily Activity Raw Score: 24 (09/20/21 1002)  AM-PAC Inpatient ADL T-Scale Score : 57.54 (09/20/21 1002)  ADL Inpatient CMS 0-100% Score: 0 (09/20/21 1002)  ADL Inpatient CMS G-Code Modifier : [address] (09/20/21 1002)  Goals  Short term goals  Time Frame for Short term goals: 1x  Short term goal 1: Perform UE /LE ADL tasks independently--goal met 9/20  Short term goal 2: Perform functional mobility independently during ADL activity---goal met 9/20     Therapy Time   Individual Concurrent Group Co-treatment   Time In 0820         Time Out 0844         Minutes 24         Timed Code Treatment Minutes: 14 Minutes (10 min eval)     Marcie Sin OT

## 2021-09-20 NOTE — CONSULTS
[address] [facility]  [phone]      Attending Physician: Alee Elias MD  Reason for Consultation/Chief Complaint: [facility]    Subjective   History of Present Illness:  Sami Glynn is a 66 y.o. patient who presented to the hospital with complaints of shortness of breath. She states that 3 of her family members has been in close contact have Covid and she concerned that she Covid positive as well. She is been having fevers up to 103 nonproductive cough and trouble breathing. She states she really does not have any chest pain except when coughing and is sharp in nature. She states that she does feel some palpitations from time to time they are much better than initially now that she is taking magnesium oxide but she does feel that increase this slightly more from her baseline. Otherwise she has no complaints    Past Medical History:   has a past medical history of Arthritis, Asthma, Diabetes mellitus ([address] History of palpitations, Hyperlipidemia, and Hypertension. Surgical History:   has a past surgical history that includes Cholecystectomy; Gastric bypass surgery (2005); Hysterectomy; hernia repair; cyst removal; Tonsillectomy; Colonoscopy; Endoscopy, colon, diagnostic; Coronary angioplasty (10/30/14); other surgical history (CYSTOSCOPY, RIGHT URETERAL STONE MANIPULATION WITH RIGHT); Total shoulder arthroplasty (Left, 12/15/2016); Cataract removal with implant (Right, 10/18/2017); and Cataract removal with implant (Left, 11/08/2017). Social History:   reports that she has never smoked. She has never used smokeless tobacco. She reports that she does not drink alcohol and does not use drugs. Family History:  family history includes Heart Attack in her maternal grandmother. Home Medications:  Were reviewed and are listed in nursing record and/or below  Prior to Admission medications    Medication Sig Start Date End Date Taking?  Authorizing Provider   magnesium oxide (MAGNESIUM-OXIDE) 400 (241.3 Mg) MG TABS tablet Take 1 tablet by mouth 2 times daily 6/28/19  Yes Ermelinda House MD   lisinopril-hydrochlorothiazide (PRINZIDE;ZESTORETIC) 20-12.5 MG per tablet Take 1 tablet by mouth daily   Yes Historical Provider, MD   VITAMIN D, CHOLECALCIFEROL, PO Take by mouth daily   Yes Historical Provider, MD   Dulaglutide (TRULICITY) 1.5 AQ/6.6MD SOPN Inject into the skin once a week   Yes Historical Provider, MD   metFORMIN (GLUCOPHAGE) 500 MG tablet Take 1,000 mg by mouth 2 times daily (with meals). Yes Historical Provider, MD   albuterol (PROVENTIL HFA;VENTOLIN HFA) 108 (90 BASE) MCG/ACT inhaler Inhale 2 puffs into the lungs every 4 hours as needed. Yes Historical Provider, MD   metoprolol tartrate (LOPRESSOR) 50 MG tablet TAKE 1 TABLET BY MOUTH TWICE DAILY 8/28/20   Meliza Erwin MD   levocetirizine (XYZAL) 5 MG tablet Take 5 mg by mouth nightly    Historical Provider, MD   Linagliptin (TRADJENTA PO) Take by mouth    Historical Provider, MD   Cyanocobalamin (VITAMIN B-12 PO) Take by mouth 2 times daily     Historical Provider, MD   Blood Pressure Monitoring (B-D ASSURE BPM/AUTO ARM CUFF) MISC For essential hypertension 11/11/15   Ermelinda House MD   gabapentin (NEURONTIN) 100 MG capsule Take 500 mg by mouth nightly. She only takes at night because it makes her tired     Historical Provider, MD   tiZANidine (ZANAFLEX) 4 MG tablet Take 4 mg by mouth every 6 hours as needed. Historical Provider, MD   simvastatin (ZOCOR) 20 MG tablet Take 20 mg by mouth nightly.     Historical Provider, MD        CURRENT Medications:  aspirin chewable tablet 324 mg, Once  glucose (GLUTOSE) 40 % oral gel 15 g, PRN  dextrose 50 % IV solution, PRN  glucagon (rDNA) injection 1 mg, PRN  dextrose 5 % solution, PRN  insulin lispro (HUMALOG) injection vial 0-12 Units, TID WC  insulin lispro (HUMALOG) injection vial 0-6 Units, Nightly  sodium chloride flush 0.9 % injection 5-40 mL, 2 times per day  sodium chloride flush 0.9 % injection 5-40 mL, PRN  0.9 % sodium chloride infusion, PRN  enoxaparin (LOVENOX) injection 30 mg, BID  ondansetron (ZOFRAN-ODT) disintegrating tablet 4 mg, Q8H PRN   Or  ondansetron (ZOFRAN) injection 4 mg, Q6H PRN  polyethylene glycol (GLYCOLAX) packet 17 g, Daily PRN  acetaminophen (TYLENOL) tablet 650 mg, Q6H PRN   Or  acetaminophen (TYLENOL) suppository 650 mg, Q6H PRN  famotidine (PEPCID) tablet 20 mg, BID  guaiFENesin-dextromethorphan (ROBITUSSIN DM) 100-10 MG/5ML syrup 5 mL, Q4H PRN  methylPREDNISolone sodium (SOLU-MEDROL) injection 40 mg, Q12H  Vitamin D (CHOLECALCIFEROL) tablet 2,000 Units, Daily  sodium chloride flush 0.9 % injection 5-40 mL, 2 times per day  sodium chloride flush 0.9 % injection 5-40 mL, PRN  0.9 % sodium chloride infusion, PRN  ondansetron (ZOFRAN-ODT) disintegrating tablet 4 mg, Q8H PRN   Or  ondansetron (ZOFRAN) injection 4 mg, Q6H PRN  acetaminophen (TYLENOL) tablet 650 mg, Q6H PRN   Or  acetaminophen (TYLENOL) suppository 650 mg, Q6H PRN  polyethylene glycol (GLYCOLAX) packet 17 g, Daily PRN  aspirin chewable tablet 81 mg, Daily  atorvastatin (LIPITOR) tablet 40 mg, Nightly  0.9 % sodium chloride infusion, Continuous  levoFLOXacin (LEVAQUIN) 500 MG/100ML infusion 500 mg, Q24H  melatonin disintegrating tablet 5 mg, Nightly  cyclobenzaprine (FLEXERIL) tablet 10 mg, TID PRN        Allergies:  Morphine, Sulfamethoxazole, Trimethoprim, Alendronate, Bactrim [sulfamethoxazole-trimethoprim], Buspirone, Calcitonin (salmon), Demerol hcl [meperidine], Dust mite extract, Esomeprazole magnesium, Glipizide, Metformin, Mometasone, Omeprazole, Other, Oxycodone, Oxycodone-acetaminophen, Pcn [penicillins], Percocet [oxycodone-acetaminophen], Prednisone, Propoxyphene, Ranitidine, Sulfa antibiotics, Toradol [ketorolac tromethamine], Tramadol, and Zantac [ranitidine hcl]     Review of Systems:   A 14 point review of symptoms completed.  Pertinent positives identified in the HPI, all other review of symptoms negative as below.       Objective   PHYSICAL EXAM:    Vitals:    09/20/21 0359   BP: 135/65   Pulse: 78   Resp: 20   Temp: 98.3 °F (36.8 °C)   SpO2: 95%    Weight: 160 lb (72.6 kg)         General Appearance:  Alert, cooperative, no distress, appears stated age   Head:  Normocephalic, without obvious abnormality, atraumatic   Eyes:  PERRL, conjunctiva/corneas clear   Nose: Nares normal, no drainage or sinus tenderness   Throat: Lips, mucosa, and tongue normal   Neck: Supple, symmetrical, trachea midline, no adenopathy, thyroid: not enlarged, symmetric, no tenderness/mass/nodules, no carotid bruit or JVD   Lungs:   Clear to auscultation bilaterally, respirations unlabored   Chest Wall:  No deformity or tenderness   Heart:  Regular rate and rhythm, S1, S2 normal, no obvious murmur, rub or gallop   Abdomen:   Soft, non-tender, bowel sounds active all four quadrants,  no masses, no organomegaly   Extremities: Extremities normal, atraumatic, no cyanosis or edema   Pulses: 2+ and symmetric   Skin: Skin color, texture, turgor normal, no rashes or lesions   Pysch: Normal mood and affect   Neurologic: Normal gross motor and sensory exam.         Labs   CBC:   Lab Results   Component Value Date    WBC 4.3 09/20/2021    RBC 3.90 09/20/2021    HGB 11.1 09/20/2021    HCT 35.9 09/20/2021    MCV 92.0 09/20/2021    RDW 24.1 09/20/2021     09/20/2021     CMP:  Lab Results   Component Value Date     09/19/2021    K 5.1 09/19/2021    CL 96 09/19/2021    CO2 19 09/19/2021    BUN 38 09/19/2021    CREATININE 1.4 09/19/2021    GFRAA 45 09/19/2021    GFRAA >60 04/02/2013    AGRATIO 1.2 09/19/2021    LABGLOM 37 09/19/2021    GLUCOSE 127 09/19/2021    PROT 6.8 09/19/2021    CALCIUM 8.9 09/19/2021    BILITOT 0.7 09/19/2021    ALKPHOS 58 09/19/2021    AST 47 09/19/2021    ALT 44 09/19/2021     PT/INR:  No results found for: PTINR  HgBA1c:  Lab Results   Component Value Date    LABA1C 6.5 2015     Lab Results   Component Value Date    TROPONINI <0.01 2021         Cardiac Data     Last EK2021 NSR with LBBB, no ischemia  2021 NSR with LBBB, no change    Echo: 2018   Normal left ventricle systolic function with an estimated ejection fraction of 55%. No regional wall motion abnormalities are seen. Grade I diastolic dysfunction with normal filing pressure. The non-coronary cusp of the aortic valve is thickened/calcified with   decreased leaflet mobility. No aortic stenosis noted. Mild pulmonic regurgitation. Mild tricuspid regurgitation. Systolic pulmonary artery pressure (SPAP) is normal and estimated at 24 mmHg   (right atrial pressure 3 mmHg). Stress Test:    Cath: 10/2014  Mild nonobstructive CAD    Studies:     Assessment and Plan      1. Abnormal ECG: new LBBB  2. Aortic sclerosis  3. CAD: cath showed <10% in , very minimal CAD. 4. Hx of PVCs: Old Holter  Significant multifocal ectopy. >15% PVC burden, <1% atrial ectopy.               With medication--> holter 2015 with <0.1% burden  5. Hypertension:remains controlled    PLAN  1. No obvious murmur on exam but having to use isolation stethoscope limits auscultation. 2.  Update Echo   -Can be done as an outpatient once out of Covid isolation if needed  3. Continue telemetry. Informed patient of PVCs increased she will need another monitor. Continue magnesium oxide for now      Patient Active Problem List   Diagnosis    Chest pain    Hypertension    Hyperlipidemia    Gross hematuria    Obstructive uropathy    PVC's (premature ventricular contractions)    Palpitations    Coronary artery disease involving native coronary artery of native heart without angina pectoris    Closed fracture of proximal end of left humerus    PNA (pneumonia)    Pneumonia           Thank you for allowing us to participate in the care of Evon Deal.  Please call me with any questions [phone]. Ana Gill MD, [address] Cardiologist  [address]  [phone] [facility]  [phone] [address] [place] [address]  9/20/2021 8:05 AM    I will address the patient's cardiac risk factors and adjusted pharmacologic treatment as needed. In addition, I have reinforced the need for patient directed risk factor modification. All questions and concerns were addressed to the patient/family. Alternatives to my treatment were discussed. The note was completed using EMR. Every effort was made to ensure accuracy; however, inadvertent computerized transcription errors may be present.

## 2021-09-21 ENCOUNTER — APPOINTMENT (OUTPATIENT)
Dept: GENERAL RADIOLOGY | Age: 71
DRG: 871 | End: 2021-09-21
Payer: MEDICARE

## 2021-09-21 LAB
ANION GAP SERPL CALCULATED.3IONS-SCNC: 10 MMOL/L (ref 3–16)
BETA-HYDROXYBUTYRATE: 0.18 MMOL/L (ref 0–0.27)
BUN BLDV-MCNC: 23 MG/DL (ref 7–20)
CALCIUM SERPL-MCNC: 8.3 MG/DL (ref 8.3–10.6)
CHLORIDE BLD-SCNC: 96 MMOL/L (ref 99–110)
CO2: 28 MMOL/L (ref 21–32)
CREAT SERPL-MCNC: 1.1 MG/DL (ref 0.6–1.2)
GFR AFRICAN AMERICAN: 59
GFR NON-AFRICAN AMERICAN: 49
GLUCOSE BLD-MCNC: 111 MG/DL (ref 70–99)
GLUCOSE BLD-MCNC: 112 MG/DL (ref 70–99)
GLUCOSE BLD-MCNC: 115 MG/DL (ref 70–99)
GLUCOSE BLD-MCNC: 135 MG/DL (ref 70–99)
GLUCOSE BLD-MCNC: 167 MG/DL (ref 70–99)
PERFORMED ON: ABNORMAL
POTASSIUM SERPL-SCNC: 3.2 MMOL/L (ref 3.5–5.1)
SARS-COV-2, NAAT: NOT DETECTED
SODIUM BLD-SCNC: 134 MMOL/L (ref 136–145)

## 2021-09-21 PROCEDURE — 6370000000 HC RX 637 (ALT 250 FOR IP): Performed by: HOSPITALIST

## 2021-09-21 PROCEDURE — 80048 BASIC METABOLIC PNL TOTAL CA: CPT

## 2021-09-21 PROCEDURE — 82010 KETONE BODYS QUAN: CPT

## 2021-09-21 PROCEDURE — 36415 COLL VENOUS BLD VENIPUNCTURE: CPT

## 2021-09-21 PROCEDURE — 2580000003 HC RX 258: Performed by: NURSE PRACTITIONER

## 2021-09-21 PROCEDURE — 6360000002 HC RX W HCPCS: Performed by: INTERNAL MEDICINE

## 2021-09-21 PROCEDURE — 6370000000 HC RX 637 (ALT 250 FOR IP): Performed by: NURSE PRACTITIONER

## 2021-09-21 PROCEDURE — 99232 SBSQ HOSP IP/OBS MODERATE 35: CPT | Performed by: NURSE PRACTITIONER

## 2021-09-21 PROCEDURE — 87040 BLOOD CULTURE FOR BACTERIA: CPT

## 2021-09-21 PROCEDURE — 87635 SARS-COV-2 COVID-19 AMP PRB: CPT

## 2021-09-21 PROCEDURE — 6370000000 HC RX 637 (ALT 250 FOR IP): Performed by: INTERNAL MEDICINE

## 2021-09-21 PROCEDURE — 1200000000 HC SEMI PRIVATE

## 2021-09-21 PROCEDURE — 2500000003 HC RX 250 WO HCPCS: Performed by: HOSPITALIST

## 2021-09-21 PROCEDURE — 2580000003 HC RX 258: Performed by: HOSPITALIST

## 2021-09-21 PROCEDURE — 87086 URINE CULTURE/COLONY COUNT: CPT

## 2021-09-21 PROCEDURE — 71045 X-RAY EXAM CHEST 1 VIEW: CPT

## 2021-09-21 PROCEDURE — 99233 SBSQ HOSP IP/OBS HIGH 50: CPT | Performed by: HOSPITALIST

## 2021-09-21 RX ORDER — ASPIRIN 81 MG/1
81 TABLET, CHEWABLE ORAL DAILY
Qty: 30 TABLET | Refills: 3 | Status: SHIPPED | OUTPATIENT
Start: 2021-09-22 | End: 2021-09-28

## 2021-09-21 RX ORDER — POTASSIUM CHLORIDE 20 MEQ/1
40 TABLET, EXTENDED RELEASE ORAL ONCE
Status: COMPLETED | OUTPATIENT
Start: 2021-09-21 | End: 2021-09-21

## 2021-09-21 RX ORDER — LEVOFLOXACIN 250 MG/1
250 TABLET ORAL DAILY
Qty: 4 TABLET | Refills: 0 | Status: SHIPPED | OUTPATIENT
Start: 2021-09-21 | End: 2021-09-25

## 2021-09-21 RX ADMIN — Medication 5 MG: at 21:18

## 2021-09-21 RX ADMIN — SODIUM BICARBONATE: 84 INJECTION, SOLUTION INTRAVENOUS at 04:14

## 2021-09-21 RX ADMIN — MAGNESIUM GLUCONATE 500 MG ORAL TABLET 400 MG: 500 TABLET ORAL at 21:18

## 2021-09-21 RX ADMIN — FAMOTIDINE 20 MG: 20 TABLET ORAL at 08:25

## 2021-09-21 RX ADMIN — MAGNESIUM GLUCONATE 500 MG ORAL TABLET 400 MG: 500 TABLET ORAL at 08:25

## 2021-09-21 RX ADMIN — SODIUM ZIRCONIUM CYCLOSILICATE 5 G: 5 POWDER, FOR SUSPENSION ORAL at 08:25

## 2021-09-21 RX ADMIN — Medication 10 ML: at 21:19

## 2021-09-21 RX ADMIN — INSULIN LISPRO 2 UNITS: 100 INJECTION, SOLUTION INTRAVENOUS; SUBCUTANEOUS at 08:26

## 2021-09-21 RX ADMIN — LEVOFLOXACIN 250 MG: 5 INJECTION, SOLUTION INTRAVENOUS at 17:02

## 2021-09-21 RX ADMIN — Medication 2000 UNITS: at 08:25

## 2021-09-21 RX ADMIN — POTASSIUM CHLORIDE 40 MEQ: 20 TABLET, EXTENDED RELEASE ORAL at 13:58

## 2021-09-21 RX ADMIN — ATORVASTATIN CALCIUM 40 MG: 10 TABLET, FILM COATED ORAL at 21:18

## 2021-09-21 RX ADMIN — SODIUM CHLORIDE 25 ML: 9 INJECTION, SOLUTION INTRAVENOUS at 17:00

## 2021-09-21 RX ADMIN — FAMOTIDINE 20 MG: 20 TABLET ORAL at 21:18

## 2021-09-21 RX ADMIN — ACETAMINOPHEN 650 MG: 325 TABLET ORAL at 14:39

## 2021-09-21 RX ADMIN — CYCLOBENZAPRINE HYDROCHLORIDE 10 MG: 10 TABLET, FILM COATED ORAL at 19:07

## 2021-09-21 ASSESSMENT — ENCOUNTER SYMPTOMS
RESPIRATORY NEGATIVE: 1
GASTROINTESTINAL NEGATIVE: 1

## 2021-09-21 NOTE — PLAN OF CARE
Problem: Falls - Risk of:  Goal: Will remain free from falls  Description: Will remain free from falls  Outcome: Ongoing  Note: Pt remains safe. Non skid footwear on. Uses call light appropriately to call out for assistance. Bed locked in lowest position; side rails 2/4; call light and bedside table within reach of pt.

## 2021-09-21 NOTE — CARE COORDINATION
Care being managed by [facility], Cardiology, [facility]. LOS 2. Pt IPTA- was nearing readiness for DC when spiked a fever to 102 this afternoon. Now to have repeat Covid testing. CM following.   James Ibarra RN

## 2021-09-21 NOTE — PROGRESS NOTES
Comprehensive Nutrition Assessment    Type and Reason for Visit:  Initial, Positive Nutrition Screen    Nutrition Recommendations/Plan:   1. Continue regular, carb control diet  2. Continue Glucerna BID   3. Monitor po intakes, nutrition adequacy, weights, pertinent labs, BMs    Nutrition Assessment:  Positive nutrition screen for weight loss, decreased appetite and MST score of 2. Pt admitted with feelings of sob, cough, fatigue, malaise and mild chest pain. Pt on regular, carb control/thins. ONS ordered BID. Intake 0-75% per EMR. Pt reported decreased appetite over the last 5-7 days. Wt = 158#, pt reported about 5# wt loss over the last week. Encouraged po intake. No c/o N/V/D or abdominal pain. Nephro following r/t hyperkalemia and metabolic acidosis. Will continue to monitor. Malnutrition Assessment:  Malnutrition Status: At risk for malnutrition (Comment)    Context:  Acute Illness         Estimated Daily Nutrient Needs:  Energy (kcal):  6208-3218; Weight Used for Energy Requirements:  Ideal (52.3kg)     Protein (g):  63-78; Weight Used for Protein Requirements:  Ideal (52.3kg; 1.2-1.5g/kg)        Fluid (ml/day):   ; Method Used for Fluid Requirements:  1 ml/kcal      Nutrition Related Findings:  BM x1 today. Na 134. K 3.2. -362 since admit - pt was on steroids, d/c'd 9/20. A1C 6.4% (9/19). +3.9L since admit per I&O. Wounds:  Skin Tears (skin tear to right elbow;)       Current Nutrition Therapies:    ADULT DIET; Regular; 4 carb choices (60 gm/meal)  Adult Oral Nutrition Supplement; Diabetic Oral Supplement    Anthropometric Measures:  · Height: 5' 3\" (160 cm)  · Current Body Weight: 160 lb (72.6 kg)   · Ideal Body Weight: 115 lbs;   · BMI: 28.4  · BMI Categories: Overweight (BMI 25.0-29. 9)       Nutrition Diagnosis:   · Inadequate oral intake related to inadequate protein-energy intake as evidenced by poor intake prior to admission, intake 0-25%, intake 26-50%, intake 51-75%      Nutrition Interventions:   Food and/or Nutrient Delivery:  Continue Oral Nutrition Supplement, Continue Current Diet  Nutrition Education/Counseling:  Education not indicated   Coordination of Nutrition Care:  Continue to monitor while inpatient    Goals:  Pt will consume 50% or greater of meals and ONS during this admission       Nutrition Monitoring and Evaluation:   Behavioral-Environmental Outcomes:  None Identified   Food/Nutrient Intake Outcomes:  Food and Nutrient Intake, Supplement Intake  Physical Signs/Symptoms Outcomes:  Biochemical Data, Weight     Discharge Planning:    Continue current diet, Continue Oral Nutrition Supplement     Electronically signed by Jaiden Bueno on 9/21/21 at 1:30 PM EDT    Contact: [phone]

## 2021-09-21 NOTE — PROGRESS NOTES
Hospitalist Progress Note      PCP: Macy Corral    Date of Admission: 9/19/2021    Chief Complaint: C/O concerns for COVID and mild chest pain     Hospital Course:   66 y.o. female who presented to [facility] with feelings of sob, cough, fatigue. Malaise  and mild chest pain. PT is vaccinated and her family has + covid currently. She is concerned that she is covid + as well.      Pt found to have mild THEO, CXR demonstrated small left pna. VSS.  No hypoxia   ECG demonstrated New LBBB      Pt will be admitted for further evalaution and treatment     Subjective:   Feels better  No cough sputum shortness of breath chest pain fever or change in mental status  Appetite is better      Medications:  Reviewed    Infusion Medications    IV infusion builder 100 mL/hr at 09/21/21 0629    dextrose      sodium chloride 25 mL (09/20/21 1627)    sodium chloride       Scheduled Medications    magnesium oxide  400 mg Oral BID    levofloxacin  250 mg IntraVENous Q24H    sodium zirconium cyclosilicate  5 g Oral TID    aspirin  324 mg Oral Once    insulin lispro  0-12 Units SubCUTAneous TID WC    insulin lispro  0-6 Units SubCUTAneous Nightly    sodium chloride flush  5-40 mL IntraVENous 2 times per day    enoxaparin  30 mg SubCUTAneous BID    famotidine  20 mg Oral BID    Vitamin D  2,000 Units Oral Daily    sodium chloride flush  5-40 mL IntraVENous 2 times per day    aspirin  81 mg Oral Daily    atorvastatin  40 mg Oral Nightly    melatonin  5 mg Oral Nightly     PRN Meds: glucose, dextrose, glucagon (rDNA), dextrose, sodium chloride flush, sodium chloride, ondansetron **OR** ondansetron, polyethylene glycol, acetaminophen **OR** acetaminophen, guaiFENesin-dextromethorphan, sodium chloride flush, sodium chloride, ondansetron **OR** ondansetron, acetaminophen **OR** acetaminophen, polyethylene glycol, cyclobenzaprine      Intake/Output Summary (Last 24 hours) at 9/21/2021 3272  Last data filed at 9/21/2021 0629  Gross per 24 hour   Intake 2276.75 ml   Output --   Net 2276.75 ml       Physical Exam Performed:    /72   Pulse 71   Temp 98.3 °F (36.8 °C) (Oral)   Resp 16   Ht 5' 3\" (1.6 m)   Wt 158 lb 14.4 oz (72.1 kg)   SpO2 91%   BMI 28.15 kg/m²     General appearance: No apparent distress, appears stated age and cooperative. HEENT: . Conjunctivae/corneas clear. Neck: Supple, with full range of motion. No jugular venous distention. Trachea midline. Respiratory:  Normal respiratory effort. Clear to auscultation, bilaterally without Rales/Wheezes/Rhonchi. Cardiovascular: Regular rate and rhythm with normal S1/S2 without murmurs, rubs or gallops. Abdomen: Soft, non-tender, non-distended with normal bowel sounds. Musculoskeletal: No clubbing, cyanosis or edema bilaterally. Full range of motion without deformity. Skin: Skin color, texture, turgor normal.  No rashes or lesions. Neurologic:  Neurovascularly intact without any focal sensory/motor deficits. Psychiatric: Alert and oriented,   Capillary Refill: Brisk,3 seconds, normal   Peripheral Pulses: +2 palpable, equal bilaterally       Labs:   Recent Labs     09/19/21  0947 09/20/21  0540   WBC 5.8 4.3   HGB 11.0* 11.1*   HCT 34.7* 35.9*   * 106*     Recent Labs     09/19/21  0947 09/20/21  1300 09/20/21  1933   * 135* 134*   K 5.1 5.7* 4.9   CL 96* 103 102   CO2 19* 15* 18*   BUN 38* 33* 39*   CREATININE 1.4* 1.2 1.2   CALCIUM 8.9 8.4 8.2*     Recent Labs     09/19/21  0947   AST 47*   ALT 44*   BILITOT 0.7   ALKPHOS 58     No results for input(s): INR in the last 72 hours.   Recent Labs     09/19/21  0947 09/19/21  1424 09/19/21  1747   TROPONINI <0.01 <0.01 <0.01       Urinalysis:      Lab Results   Component Value Date    NITRU Negative 09/19/2021    WBCUA 0-2 09/19/2021    RBCUA 0-2 09/19/2021    BLOODU TRACE-INTACT 09/19/2021    SPECGRAV 1.010 09/19/2021    GLUCOSEU >=1000 09/19/2021       Radiology:  XR CHEST PORTABLE Final Result   Small left basilar pneumonia. XR HIP RIGHT (2-3 VIEWS)   Final Result   No acute finding of the right hip. Assessment/Plan:    Active Hospital Problems    Diagnosis     Abnormal ECG [R94.31]     Aortic valve sclerosis [I35.8]     PNA (pneumonia) [J18.9]     Pneumonia [J18.9]     Coronary artery disease involving native coronary artery of native heart without angina pectoris [I25.10]     PVC (premature ventricular contraction) [I49.3]     Hypertension [I10]     Hyperlipidemia [E78.5]     Chest pain [R07.9]      67 yo female vaccinated presents with mild chest pain, fatigue, sob, general aches and pains pt concerned for COVID      COVID -19-ruled out rapid and PCR are negative  -Is asymptomatic     PNA-left base, -continue antibiotics short course, , no hypoxia    Asthma: Exacerbation-resolved change steroid to p.o. short course     THEO: pre renal, low volume, poor appetite last few days and was taking BP meds 9 ACE. HCTZ combo, hold for now   Repeat BMP pending     HTN: controlled: cont BB only for now      DM: reports controlled   - hold orals, SSI while IP, titrate as needed   - A1C pending      Mild chest pain- low suspicion of ACS, however does have CAD hx and new lBBB on ECG. Add asa and ask Cardiology to see for ischemic work up needs   - initial trop negtaiv, serial   Cardiology input appreciated  Advised outpatient echo and magnesium for PVC     HLD- cont statin     A -a gap acidosis / hyperkalemia - on hco3 infusion, nephrology input appreciated - resolved   Ok fpr dc     DVT Prophylaxis: Lovenox  Diet: ADULT DIET;  Regular; 4 carb choices (60 gm/meal)  Adult Oral Nutrition Supplement; Diabetic Oral Supplement  Code Status: Full Code    PT/OT Eval Status: Home with home care    Dispo -today if okay with cardiology     Brittney Macario MD

## 2021-09-21 NOTE — PROGRESS NOTES
Pt a/o. Stated no pain, no nausea. No emesis noted. Pt spiked a temp of 102 earlier this afternoon, Dr. Soraida Castillo was notified at that time, see new orders. Tylenol given per MAR. Temperature decreased to 98.2. Call light within reach; will continue to monitor.

## 2021-09-21 NOTE — PLAN OF CARE
Problem: Nutrition  Goal: Optimal nutrition therapy  9/21/2021 1333 by Amy Perez  Outcome: Ongoing  Note: Nutrition Problem #1: Inadequate oral intake  Intervention: Food and/or Nutrient Delivery: Continue Oral Nutrition Supplement, Continue Current Diet  Nutritional Goals: Pt will consume 50% or greater of meals and ONS during this admission

## 2021-09-21 NOTE — PROGRESS NOTES
Nephrology Progress  Note                                                                                                                                                                                                                                                                                                                                                               Office : [phone]     Fax :[phone]              Patient's Name: Mago Kulkarni  11:47 AM  9/21/2021    Reason for Consult:  Metabolic acidosis , hyperkalemia   Requesting Physician:  Magdiel Anaya      Chief Complaint:  Chest pain       Interval History :      No diarrhea  No sob  No chest pain  No fever    Getting Bicarb IVF        History of Present Ilness:    Mago Kulkarni is a 66 y.o. female with PMH of CAD  who presented to [facility] with feelings of sob, cough, fatigue. Malaise  and mild chest pain.       Pt found to have mild THEO, CXR demonstrated small left pna. VSS.  No hypoxia   ECG demonstrated New LBBB       Nephrology consult for management of hyperkalemia and metabolic acidosis       Past Medical History:   Diagnosis Date    Arthritis     Asthma     Diabetes mellitus ([address]     type 2, takes PO meds    History of palpitations     Hyperlipidemia     Hypertension        Past Surgical History:   Procedure Laterality Date    CATARACT REMOVAL WITH IMPLANT Right 10/18/2017    entered to epic from h&P    CATARACT REMOVAL WITH IMPLANT Left 11/08/2017    CHOLECYSTECTOMY      COLONOSCOPY      CORONARY ANGIOPLASTY  10/30/14    CYST REMOVAL      from right wrist     ENDOSCOPY, COLON, DIAGNOSTIC      GASTRIC BYPASS SURGERY  2005    HERNIA REPAIR      HYSTERECTOMY      age 30    OTHER SURGICAL HISTORY  CYSTOSCOPY, RIGHT URETERAL STONE MANIPULATION WITH RIGHT    SHOULDER ARTHROPLASTY Left 12/15/2016    LEFT PROXIMAL HUMERUS OPEN REDUCTION INTERNAL FIXATION     TONSILLECTOMY         Family History   Problem Relation Age of Onset    Heart Attack Maternal Grandmother         reports that she has never smoked. She has never used smokeless tobacco. She reports that she does not drink alcohol and does not use drugs.     Allergies:  Morphine, Sulfamethoxazole, Trimethoprim, Alendronate, Bactrim [sulfamethoxazole-trimethoprim], Buspirone, Calcitonin (salmon), Demerol hcl [meperidine], Dust mite extract, Esomeprazole magnesium, Glipizide, Metformin, Mometasone, Omeprazole, Other, Oxycodone, Oxycodone-acetaminophen, Pcn [penicillins], Percocet [oxycodone-acetaminophen], Prednisone, Propoxyphene, Ranitidine, Sulfa antibiotics, Toradol [ketorolac tromethamine], Tramadol, and Zantac [ranitidine hcl]    Current Medications:    magnesium oxide (MAG-OX) tablet 400 mg, BID  levoFLOXacin (LEVAQUIN) 250 MG/50ML infusion 250 mg, Q24H  sodium bicarbonate 150 mEq in dextrose 5 % 1,000 mL infusion, Continuous  aspirin chewable tablet 324 mg, Once  glucose (GLUTOSE) 40 % oral gel 15 g, PRN  dextrose 50 % IV solution, PRN  glucagon (rDNA) injection 1 mg, PRN  dextrose 5 % solution, PRN  insulin lispro (HUMALOG) injection vial 0-12 Units, TID WC  insulin lispro (HUMALOG) injection vial 0-6 Units, Nightly  sodium chloride flush 0.9 % injection 5-40 mL, 2 times per day  sodium chloride flush 0.9 % injection 5-40 mL, PRN  0.9 % sodium chloride infusion, PRN  enoxaparin (LOVENOX) injection 30 mg, BID  ondansetron (ZOFRAN-ODT) disintegrating tablet 4 mg, Q8H PRN   Or  ondansetron (ZOFRAN) injection 4 mg, Q6H PRN  polyethylene glycol (GLYCOLAX) packet 17 g, Daily PRN  acetaminophen (TYLENOL) tablet 650 mg, Q6H PRN   Or  acetaminophen (TYLENOL) suppository 650 mg, Q6H PRN  famotidine (PEPCID) tablet 20 mg, BID  guaiFENesin-dextromethorphan (ROBITUSSIN DM) 100-10 MG/5ML syrup 5 mL, Q4H PRN  Vitamin D (CHOLECALCIFEROL) tablet 2,000 Units, Daily  sodium chloride flush 0.9 % injection 5-40 mL, 2 times per day  sodium chloride flush 0.9 % injection 5-40 mL, PRN  0.9 % sodium chloride infusion, PRN  ondansetron (ZOFRAN-ODT) disintegrating tablet 4 mg, Q8H PRN   Or  ondansetron (ZOFRAN) injection 4 mg, Q6H PRN  acetaminophen (TYLENOL) tablet 650 mg, Q6H PRN   Or  acetaminophen (TYLENOL) suppository 650 mg, Q6H PRN  polyethylene glycol (GLYCOLAX) packet 17 g, Daily PRN  aspirin chewable tablet 81 mg, Daily  atorvastatin (LIPITOR) tablet 40 mg, Nightly  melatonin disintegrating tablet 5 mg, Nightly  cyclobenzaprine (FLEXERIL) tablet 10 mg, TID PRN        Review of Systems:   14 point ROS obtained but were negative except mentioned in HPI      Physical exam:     Vitals:  /72   Pulse 71   Temp 98.3 °F (36.8 °C) (Oral)   Resp 16   Ht 5' 3\" (1.6 m)   Wt 158 lb 14.4 oz (72.1 kg)   SpO2 91%   BMI 28.15 kg/m²   Constitutional:  OAA X3 NAD  Skin: no rash, turgor wnl  Heent:  eomi, mmm  Neck: no bruits or jvd noted  Cardiovascular:  S1, S2 without m/r/g  Respiratory: CTA B without w/r/r  Abdomen:  +bs, soft, nt, nd  Ext: no  lower extremity edema  Psychiatric: mood and affect appropriate  Musculoskeletal:  Rom, muscular strength intact    Data:   Labs:  CBC:   Recent Labs     09/19/21  0947 09/20/21  0540   WBC 5.8 4.3   HGB 11.0* 11.1*   * 106*     BMP:    Recent Labs     09/19/21  0947 09/20/21  1300 09/20/21  1933   * 135* 134*   K 5.1 5.7* 4.9   CL 96* 103 102   CO2 19* 15* 18*   BUN 38* 33* 39*   CREATININE 1.4* 1.2 1.2   GLUCOSE 127* 193* 362*     Ca/Mg/Phos:   Recent Labs     09/19/21  0947 09/20/21  1300 09/20/21  1933   CALCIUM 8.9 8.4 8.2*     Hepatic:   Recent Labs     09/19/21 0947   AST 47*   ALT 44*   BILITOT 0.7   ALKPHOS 58     Troponin:   Recent Labs     09/19/21  0947 09/19/21  1424 09/19/21  1747   TROPONINI <0.01 <0.01 <0.01     BNP: No results for input(s): BNP in the last 72 hours. Lipids: No results for input(s): CHOL, TRIG, HDL, LDLCALC, LABVLDL in the last 72 hours.   ABGs: No results for input(s): PHART, PO2ART, WOF6XTQ in the last 72 hours. INR: No results for input(s): INR in the last 72 hours. UA:  Recent Labs     09/19/21  1202   COLORU Yellow   CLARITYU Clear   GLUCOSEU >=1000*   BILIRUBINUR Negative   KETUA 15*   SPECGRAV 1.010   BLOODU TRACE-INTACT*   PHUR 5.5   PROTEINU Negative   UROBILINOGEN 0.2   NITRU Negative   LEUKOCYTESUR Negative   LABMICR YES   URINETYPE NotGiven      Urine Microscopic:   Recent Labs     09/19/21  1202   WBCUA 0-2   RBCUA 0-2   EPIU 2-5     Urine Culture: No results for input(s): LABURIN in the last 72 hours. Urine Chemistry: No results for input(s): Volney Ly, PROTEINUR, NAUR in the last 72 hours. IMAGING:  XR CHEST PORTABLE   Final Result   Small left basilar pneumonia. XR HIP RIGHT (2-3 VIEWS)   Final Result   No acute finding of the right hip. Assessment/Plan     1. AGMA    Anion Gap :   15   -----> 17  ----> 14    Serum Bicarb : 19   ------> 15 -----> 18    Denies diarrhea   Denies vomiting    S/p IVF  NS  On 9/19 , 9/20     Plan      F/u BMP lab    Check serum lactic acid  Check serum ketones     Continue IVF sodium bicarb 150 meq @ 100 ml/hr        2. Hyperkalemia in setting of metabolic acidosis     F/u BMP      Give Lokelma 5 g TID    Serum K   5.1 ----> 5.7 ------> 4.9     Avoid ACEI or ARB    Low K diet     3 THEO    Serum cr improved from 1.4 to 1.2    Avoid contrast    Avoid ACEI OR ARB      3. CAD    4.  Aortic sclerosis     5 HTN    6 DLP                     Thank you for allowing us to participate in care of Rory Johnson MD  Feel free to contact me   [facility] [address]  Office : [phone]  Fax :[phone]

## 2021-09-21 NOTE — DISCHARGE INSTR - COC
Continuity of Care Form    Patient Name: Mervat Galvan   :    MRN:  [id number]    [facility] date:  2021  Discharge date:      Code Status Order: Full Code   Advance Directives:      Admitting Physician:  Phoebe Cali MD  PCP: Monet Arias    Discharging Nurse: [facility] Unit/Room#: 2476/3849-39  Discharging Unit Phone Number: [phone]    Emergency Contact:   Extended Emergency Contact Information  Primary Emergency Contact: Toan Rangel  Home Phone: [phone]  Relation: Child  Secondary Emergency Contact: Mandie Short  Home Phone: [phone]  Relation: Grandchild    Past Surgical History:  Past Surgical History:   Procedure Laterality Date    CATARACT REMOVAL WITH IMPLANT Right 10/18/2017    entered to epic from h&P    CATARACT REMOVAL WITH IMPLANT Left 2017    CHOLECYSTECTOMY      COLONOSCOPY      CORONARY ANGIOPLASTY  10/30/14    CYST REMOVAL      from right wrist     ENDOSCOPY, COLON, DIAGNOSTIC      GASTRIC BYPASS SURGERY  2005    HERNIA REPAIR      HYSTERECTOMY      age 30    OTHER SURGICAL HISTORY  CYSTOSCOPY, RIGHT URETERAL STONE MANIPULATION WITH RIGHT    SHOULDER ARTHROPLASTY Left 12/15/2016    LEFT PROXIMAL HUMERUS OPEN REDUCTION INTERNAL FIXATION     TONSILLECTOMY         Immunization History:   Immunization History   Administered Date(s) Administered    Influenza Virus Vaccine 10/03/2012, 2014, 2016    Pneumococcal Polysaccharide ([id number]) 10/03/2012       Active Problems:  Patient Active Problem List   Diagnosis Code    Chest pain R07.9    Hypertension I10    Hyperlipidemia E78.5    Gross hematuria R31.0    Obstructive uropathy N13.9    PVC (premature ventricular contraction) I49.3    Palpitations R00.2    Coronary artery disease involving native coronary artery of native heart without angina pectoris I25.10    Closed fracture of proximal end of left humerus S42.A    PNA (pneumonia) J18.9    Pneumonia J18.9    Abnormal ECG R94.31    Aortic valve sclerosis I35.8       Isolation/Infection:   Isolation            No Isolation          Patient Infection Status       Infection Onset Added Last Indicated Last Indicated By Review Planned Expiration Resolved Resolved By    None active    Resolved    COVID-19 Rule Out  09/20/21 09/20/21 Livia Espinoza RN   09/20/21 Rule-Out Test Resulted    COVID-19 Rule Out 09/19/21 09/19/21 09/19/21 COVID-19, Rapid (Ordered)   09/19/21 Rule-Out Test Resulted            Nurse Assessment:  Last Vital Signs: /74   Pulse 68   Temp 98.8 °F (37.1 °C) (Oral)   Resp 16   Ht 5' 3\" (1.6 m)   Wt 158 lb 14.4 oz (72.1 kg)   SpO2 90%   BMI 28.15 kg/m²     Last documented pain score (0-10 scale): Pain Level: 5  Last Weight:   Wt Readings from Last 1 Encounters:   09/21/21 158 lb 14.4 oz (72.1 kg)     Mental Status:  oriented and alert    IV Access:  - None    Nursing Mobility/ADLs:  Walking   Assisted  Transfer  Assisted  Bathing  Assisted  Dressing  Assisted  Toileting  Assisted  Feeding  Assisted  Med Admin  Assisted  Med Delivery   whole    Wound Care Documentation and Therapy:  Incision 12/15/16 Shoulder Lateral;Left (Active)   Number of days: 6333        Elimination:  Continence:   · Bowel: Yes  · Bladder: Yes  Urinary Catheter: None   Colostomy/Ileostomy/Ileal Conduit: No       Date of Last BM: 9/27    Intake/Output Summary (Last 24 hours) at 9/21/2021 1417  Last data filed at 9/21/2021 0629  Gross per 24 hour   Intake 2276.75 ml   Output --   Net 2276.75 ml     I/O last 3 completed shifts: In: 2276.8 [P.O.:960; I.V.:1316.8]  Out: -     Safety Concerns: At Risk for Falls    Impairments/Disabilities:      None    Nutrition Therapy:  Current Nutrition Therapy:   - Oral Diet:  General    Routes of Feeding: Oral  Liquids:  Thin Liquids  Daily Fluid Restriction: no  Last Modified Barium Swallow with Video (Video Swallowing Test): not done    Treatments at the Time of Hospital Discharge:   Respiratory Treatments: 3-4LPM  Oxygen Therapy:  is on oxygen at 3.5 L/min per nasal cannula. Ventilator:    - No ventilator support    Rehab Therapies: Physical Therapy and Occupational Therapy  Weight Bearing Status/Restrictions: No weight bearing restirctions  Other Medical Equipment (for information only, NOT a DME order):  walker  Other Treatments:     Patient's personal belongings (please select all that are sent with patient):  None    RN SIGNATURE:  Electronically signed by Steven Loza RN on 9/28/21 at 2:19 PM EDT    CASE MANAGEMENT/SOCIAL WORK SECTION    Inpatient Status Date: ***    Readmission Risk Assessment Score:  Readmission Risk              Risk of Unplanned Readmission:  15           Discharging to Facility/ Agency   · Name:   · Address:  · Phone:  · Fax:    Dialysis Facility (if applicable)   · Name:  · Address:  · Dialysis Schedule:  · Phone:  · Fax:    / signature: {Esignature:[id number]:::0}    PHYSICIAN SECTION    Prognosis: Good    Condition at Discharge: Stable    Rehab Potential (if transferring to Rehab): Good    Recommended Labs or Other Treatments After Discharge: Follow-up with PCP and cardiology as instructed. Patient advised to follow-up with her primary cardiologist and reschedule her LHC. Physician Certification: I certify the above information and transfer of Marlin Mathews  is necessary for the continuing treatment of the diagnosis listed and that she requires Home Care for less 30 days.      Update Admission H&P: No change in H&P    PHYSICIAN SIGNATURE:  Electronically signed by Chinyere Shell MD on 9/28/2021 at 1:37 PM

## 2021-09-21 NOTE — PROGRESS NOTES
Pt alert and oriented. VSS. Assessment completed as charted. Medications given per MAR. Pt still with some dizziness with ambulation. Pt resting in bed, denies needs at present time. Call light and bedside table within reach. Bed locked and in lowest position.

## 2021-09-21 NOTE — PROGRESS NOTES
[address]  Cardiology  Progress Note    Admission date:  2021    Reason for follow up visit: new LBBB    HPI/CC: Anastasia Ennis is a 66 y.o. female who was admitted 2021 for shortness of breath. Cardiology consulted for new LBBB. She has also been treated for metabolic acidosis. Rhythm has been sinus LBBB. Subjective: Denies chest pain, shortness of breath, palpitations and dizziness. Vitals:  Blood pressure 131/72, pulse 71, temperature 98.3 °F (36.8 °C), temperature source Oral, resp. rate 16, height 5' 3\" (1.6 m), weight 158 lb 14.4 oz (72.1 kg), SpO2 91 %, not currently breastfeeding.   Temp  Av.9 °F (36.6 °C)  Min: 97.7 °F (36.5 °C)  Max: 98.3 °F (36.8 °C)  Pulse  Av.4  Min: 58  Max: 74  BP  Min: 112/65  Max: 138/69  SpO2  Av.2 %  Min: 91 %  Max: 97 %    24 hour I/O    Intake/Output Summary (Last 24 hours) at 2021 1309  Last data filed at 2021 5550  Gross per 24 hour   Intake 2276.75 ml   Output --   Net 2276.75 ml     Current Facility-Administered Medications   Medication Dose Route Frequency Provider Last Rate Last Admin    perflutren lipid microspheres (DEFINITY) injection 1.65 mg  1.5 mL IntraVENous ONCE PRN JENN Cordon CNP        potassium chloride (KLOR-CON M) extended release tablet 40 mEq  40 mEq Oral Once Cloteal MD Je        magnesium oxide (MAG-OX) tablet 400 mg  400 mg Oral BID Becka Navarrete MD   400 mg at 21 0825    levoFLOXacin (LEVAQUIN) 250 MG/50ML infusion 250 mg  250 mg IntraVENous Q24H Linda Hilton MD   Stopped at 21 1730    aspirin chewable tablet 324 mg  324 mg Oral Once Hao Myers PA-C        glucose (GLUTOSE) 40 % oral gel 15 g  15 g Oral PRN JENN Tellez CNP        dextrose 50 % IV solution  12.5 g IntraVENous PRN JENN Tellez CNP        glucagon (rDNA) injection 1 mg  1 mg IntraMUSCular PRN JENN Tellez CNP        dextrose 5 % solution  100 mL/hr IntraVENous PRN JENN Rordiguez CNP        insulin lispro (HUMALOG) injection vial 0-12 Units  0-12 Units SubCUTAneous TID WC JENN Rodriguez CNP   2 Units at 09/21/21 7554    insulin lispro (HUMALOG) injection vial 0-6 Units  0-6 Units SubCUTAneous Nightly JENN Rodriguez CNP   4 Units at 09/20/21 2049    sodium chloride flush 0.9 % injection 5-40 mL  5-40 mL IntraVENous 2 times per day JENN Rodriguez CNP        sodium chloride flush 0.9 % injection 5-40 mL  5-40 mL IntraVENous PRN JENN Rodriguez CNP        0.9 % sodium chloride infusion  25 mL IntraVENous PRN JENN Rodriguez  mL/hr at 09/20/21 1627 25 mL at 09/20/21 1627    enoxaparin (LOVENOX) injection 30 mg  30 mg SubCUTAneous BID JENN Rodriguez CNP   30 mg at 09/20/21 3859    ondansetron (ZOFRAN-ODT) disintegrating tablet 4 mg  4 mg Oral Q8H PRN JENN Rodriguez CNP        Or    ondansetron [facility]) injection 4 mg  4 mg IntraVENous Q6H PRN JENN Rodriguez CNP        polyethylene glycol (GLYCOLAX) packet 17 g  17 g Oral Daily PRN JENN Rodriguez CNP        acetaminophen (TYLENOL) tablet 650 mg  650 mg Oral Q6H PRN JENN Rodriguez CNP        Or    acetaminophen (TYLENOL) suppository 650 mg  650 mg Rectal Q6H PRN JENN Rodriguez CNP        famotidine (PEPCID) tablet 20 mg  20 mg Oral BID JENN Rodriguez CNP   20 mg at 09/21/21 0825    guaiFENesin-dextromethorphan (ROBITUSSIN DM) 100-10 MG/5ML syrup 5 mL  5 mL Oral Q4H PRN JENN Rodriguez CNP        Vitamin D (CHOLECALCIFEROL) tablet 2,000 Units  2,000 Units Oral Daily JENN Rodriguez CNP   2,000 Units at 09/21/21 0825    sodium chloride flush 0.9 % injection 5-40 mL  5-40 mL IntraVENous 2 times per day JENN Rodriguez CNP        sodium chloride flush 0.9 % injection 5-40 mL  5-40 mL IntraVENous PRN JENN Rodriguez CNP        0.9 % sodium chloride infusion  25 mL IntraVENous PRN Bea Cough, APRN - CNP        ondansetron (ZOFRAN-ODT) disintegrating tablet 4 mg  4 mg Oral Q8H PRN Bea Cough, APRN - CNP        Or    ondansetron [facility]) injection 4 mg  4 mg IntraVENous Q6H PRN Bea Cough, APRN - CNP        acetaminophen (TYLENOL) tablet 650 mg  650 mg Oral Q6H PRN Bea Cough, APRN - CNP   650 mg at 09/19/21 1636    Or    acetaminophen (TYLENOL) suppository 650 mg  650 mg Rectal Q6H PRN Bea Cough, APRN - CNP        polyethylene glycol (GLYCOLAX) packet 17 g  17 g Oral Daily PRN Bea Cough, APRN - CNP        aspirin chewable tablet 81 mg  81 mg Oral Daily Hyacinth KENJI Jacksonon, APRN - CNP        atorvastatin (LIPITOR) tablet 40 mg  40 mg Oral Nightly Bea Cough, APRN - CNP   40 mg at 09/20/21 2047    melatonin disintegrating tablet 5 mg  5 mg Oral Nightly Felipe Solian, APRN - CNP   5 mg at 09/20/21 2048    cyclobenzaprine (FLEXERIL) tablet 10 mg  10 mg Oral TID PRN Felipe Solian, APRN - CNP   10 mg at 09/20/21 2053     Review of Systems   Constitutional: Positive for fatigue. Respiratory: Negative. Cardiovascular: Negative. Gastrointestinal: Negative. Neurological: Negative. Objective:     Telemetry monitor: SR    Physical Exam:  Constitutional:  Comfortable and alert, NAD, appears stated age  Eyes: PERRL, sclera nonicteric  Neck:  Supple, no masses, no thyroidmegaly, no JVD  Skin:  Warm and dry; no rash or lesions  Heart: Regular, normal apex, S1 and S2 normal, no M/G/R  Lungs:  Normal respiratory effort; clear; no wheezing/rhonchi/rales  Abdomen: soft, non tender, + bowel sounds  Extremities:  No edema or cyanosis; no clubbing  Neuro: alert and oriented, moves legs and arms equally, normal mood and affect    Data Reviewed:    Echo 2018:  Normal left ventricle systolic function with an estimated ejection fraction   of 55%. No regional wall motion abnormalities are seen.    Grade I diastolic dysfunction with normal filing pressure. The non-coronary cusp of the aortic valve is thickened/calcified with   decreased leaflet mobility. No aortic stenosis noted. Mild pulmonic regurgitation. Mild tricuspid regurgitation. Systolic pulmonary artery pressure (SPAP) is normal and estimated at 24 mmHg   (right atrial pressure 3 mmHg). Coronary angiogram 10/2014:  LM, LAD, LCX, RCA with no significant CAD (RCA with <10%)  Significant kinking and tortousity of vessel  LVEDP 5  LVEF 65%  PLAN  1. No significant CAD (only <10% in prox RCA)  2. CP likley from HTn and stress, possibly from coronary kinking. Lab Reviewed:     Renal Profile:  Lab Results   Component Value Date    CREATININE 1.1 09/21/2021    BUN 23 09/21/2021     09/21/2021    K 3.2 09/21/2021    K 4.9 09/20/2021    CL 96 09/21/2021    CO2 28 09/21/2021     CBC:    Lab Results   Component Value Date    WBC 4.3 09/20/2021    RBC 3.90 09/20/2021    HGB 11.1 09/20/2021    HCT 35.9 09/20/2021    MCV 92.0 09/20/2021    RDW 24.1 09/20/2021     09/20/2021     BNP:  No results found for: PROBNP  Fasting Lipid Panel:    Lab Results   Component Value Date    CHOL 139 12/11/2015    HDL 57 12/11/2015    TRIG 113 12/11/2015     Cardiac Enzymes:  CK/MbTroponin  Lab Results   Component Value Date    TROPONINI <0.01 09/19/2021     PT/ INR   Lab Results   Component Value Date    INR 1.02 10/30/2014    PROTIME 11.0 10/30/2014     PTT No results found for: PTT   Lab Results   Component Value Date    MG 1.60 03/27/2015      Lab Results   Component Value Date    TSH 1.49 04/03/2013     All labs and imaging reviewed today    Assessment:  LBBB: new  CAD: mild on angiogram 2014  Symptomatic PVCs  HTN: stable  DM  Metabolic acidosis    Plan:   1. Echo, can be done as outpatient if ready for discharge  2. Continue aspirin, statin, magnesium  3.  Cardiology will follow up tomorrow if she remains inpatient    Angelita Sahu, [address]  [phone]

## 2021-09-22 LAB
ANION GAP SERPL CALCULATED.3IONS-SCNC: 9 MMOL/L (ref 3–16)
BASOPHILS ABSOLUTE: 0 K/UL (ref 0–0.2)
BASOPHILS RELATIVE PERCENT: 0.1 %
BUN BLDV-MCNC: 18 MG/DL (ref 7–20)
CALCIUM SERPL-MCNC: 8.2 MG/DL (ref 8.3–10.6)
CHLORIDE BLD-SCNC: 100 MMOL/L (ref 99–110)
CO2: 25 MMOL/L (ref 21–32)
CREAT SERPL-MCNC: 0.9 MG/DL (ref 0.6–1.2)
EOSINOPHILS ABSOLUTE: 0 K/UL (ref 0–0.6)
EOSINOPHILS RELATIVE PERCENT: 0 %
GFR AFRICAN AMERICAN: >60
GFR NON-AFRICAN AMERICAN: >60
GLUCOSE BLD-MCNC: 115 MG/DL (ref 70–99)
GLUCOSE BLD-MCNC: 118 MG/DL (ref 70–99)
GLUCOSE BLD-MCNC: 139 MG/DL (ref 70–99)
GLUCOSE BLD-MCNC: 140 MG/DL (ref 70–99)
GLUCOSE BLD-MCNC: 98 MG/DL (ref 70–99)
HCT VFR BLD CALC: 36.7 % (ref 36–48)
HEMOGLOBIN: 11.5 G/DL (ref 12–16)
LV EF: 33 %
LVEF MODALITY: NORMAL
LYMPHOCYTES ABSOLUTE: 0.5 K/UL (ref 1–5.1)
LYMPHOCYTES RELATIVE PERCENT: 16.1 %
MCH RBC QN AUTO: 28.1 PG (ref 26–34)
MCHC RBC AUTO-ENTMCNC: 31.4 G/DL (ref 31–36)
MCV RBC AUTO: 89.7 FL (ref 80–100)
MONOCYTES ABSOLUTE: 0.1 K/UL (ref 0–1.3)
MONOCYTES RELATIVE PERCENT: 4.3 %
NEUTROPHILS ABSOLUTE: 2.6 K/UL (ref 1.7–7.7)
NEUTROPHILS RELATIVE PERCENT: 79.5 %
PDW BLD-RTO: 23.9 % (ref 12.4–15.4)
PERFORMED ON: ABNORMAL
PERFORMED ON: NORMAL
PLATELET # BLD: 102 K/UL (ref 135–450)
PMV BLD AUTO: 8 FL (ref 5–10.5)
POTASSIUM SERPL-SCNC: 3.7 MMOL/L (ref 3.5–5.1)
RBC # BLD: 4.1 M/UL (ref 4–5.2)
SODIUM BLD-SCNC: 134 MMOL/L (ref 136–145)
URINE CULTURE, ROUTINE: NORMAL
WBC # BLD: 3.3 K/UL (ref 4–11)

## 2021-09-22 PROCEDURE — 99233 SBSQ HOSP IP/OBS HIGH 50: CPT | Performed by: HOSPITALIST

## 2021-09-22 PROCEDURE — 2580000003 HC RX 258: Performed by: INTERNAL MEDICINE

## 2021-09-22 PROCEDURE — 6370000000 HC RX 637 (ALT 250 FOR IP): Performed by: NURSE PRACTITIONER

## 2021-09-22 PROCEDURE — 1200000000 HC SEMI PRIVATE

## 2021-09-22 PROCEDURE — 2580000003 HC RX 258: Performed by: NURSE PRACTITIONER

## 2021-09-22 PROCEDURE — 99233 SBSQ HOSP IP/OBS HIGH 50: CPT | Performed by: NURSE PRACTITIONER

## 2021-09-22 PROCEDURE — 6370000000 HC RX 637 (ALT 250 FOR IP): Performed by: INTERNAL MEDICINE

## 2021-09-22 PROCEDURE — 80048 BASIC METABOLIC PNL TOTAL CA: CPT

## 2021-09-22 PROCEDURE — 93306 TTE W/DOPPLER COMPLETE: CPT

## 2021-09-22 PROCEDURE — 36415 COLL VENOUS BLD VENIPUNCTURE: CPT

## 2021-09-22 PROCEDURE — 6360000002 HC RX W HCPCS: Performed by: INTERNAL MEDICINE

## 2021-09-22 PROCEDURE — 85025 COMPLETE CBC W/AUTO DIFF WBC: CPT

## 2021-09-22 RX ORDER — METOPROLOL SUCCINATE 25 MG/1
25 TABLET, EXTENDED RELEASE ORAL DAILY
Status: DISCONTINUED | OUTPATIENT
Start: 2021-09-22 | End: 2021-09-27

## 2021-09-22 RX ADMIN — VANCOMYCIN HYDROCHLORIDE 2000 MG: 1 INJECTION, POWDER, LYOPHILIZED, FOR SOLUTION INTRAVENOUS at 16:46

## 2021-09-22 RX ADMIN — FAMOTIDINE 20 MG: 20 TABLET ORAL at 22:34

## 2021-09-22 RX ADMIN — MEROPENEM 1000 MG: 1 INJECTION, POWDER, FOR SOLUTION INTRAVENOUS at 22:30

## 2021-09-22 RX ADMIN — ASPIRIN 81 MG: 81 TABLET, CHEWABLE ORAL at 10:37

## 2021-09-22 RX ADMIN — METOPROLOL SUCCINATE 25 MG: 25 TABLET, EXTENDED RELEASE ORAL at 13:00

## 2021-09-22 RX ADMIN — INSULIN LISPRO 1 UNITS: 100 INJECTION, SOLUTION INTRAVENOUS; SUBCUTANEOUS at 22:33

## 2021-09-22 RX ADMIN — ACETAMINOPHEN 650 MG: 325 TABLET ORAL at 02:04

## 2021-09-22 RX ADMIN — FAMOTIDINE 20 MG: 20 TABLET ORAL at 10:36

## 2021-09-22 RX ADMIN — Medication 2000 UNITS: at 10:36

## 2021-09-22 RX ADMIN — ATORVASTATIN CALCIUM 40 MG: 10 TABLET, FILM COATED ORAL at 22:34

## 2021-09-22 RX ADMIN — SODIUM CHLORIDE, PRESERVATIVE FREE 10 ML: 5 INJECTION INTRAVENOUS at 22:36

## 2021-09-22 RX ADMIN — ACETAMINOPHEN 650 MG: 325 TABLET ORAL at 22:33

## 2021-09-22 RX ADMIN — SODIUM CHLORIDE, PRESERVATIVE FREE 10 ML: 5 INJECTION INTRAVENOUS at 10:35

## 2021-09-22 RX ADMIN — MAGNESIUM GLUCONATE 500 MG ORAL TABLET 400 MG: 500 TABLET ORAL at 22:34

## 2021-09-22 RX ADMIN — Medication 5 MG: at 22:34

## 2021-09-22 RX ADMIN — MAGNESIUM GLUCONATE 500 MG ORAL TABLET 400 MG: 500 TABLET ORAL at 10:36

## 2021-09-22 RX ADMIN — ACETAMINOPHEN 650 MG: 325 TABLET ORAL at 13:00

## 2021-09-22 ASSESSMENT — PAIN SCALES - GENERAL
PAINLEVEL_OUTOF10: 0
PAINLEVEL_OUTOF10: 0

## 2021-09-22 ASSESSMENT — ENCOUNTER SYMPTOMS
RESPIRATORY NEGATIVE: 1
GASTROINTESTINAL NEGATIVE: 1

## 2021-09-22 NOTE — PROGRESS NOTES
Perfect serve message sent to Dennie Marts NP, \"FYI pt BP is 129/52\", awaiting response.     Provider aware, no new orders

## 2021-09-22 NOTE — PROGRESS NOTES
[facility] [place]  Cardiology  Progress Note    Admission date:  2021    Reason for follow up visit: new LBBB    HPI/CC: Jose Ramirez is a 66 y.o. female who was admitted 2021 for shortness of breath. Cardiology consulted for new LBBB. Echo showed EF 30-35% with anterior wall motion abnormalities. She has also been treated for metabolic acidosis, pneumonia. Rhythm has been sinus LBBB. Subjective: Denies chest pain, shortness of breath, palpitations and dizziness. Vitals:  Blood pressure 120/70, pulse 78, temperature 97.9 °F (36.6 °C), temperature source Oral, resp. rate 16, height 5' 3\" (1.6 m), weight 159 lb 8 oz (72.3 kg), SpO2 92 %, not currently breastfeeding.   Temp  Av °F (37.2 °C)  Min: 97.9 °F (36.6 °C)  Max: 102 °F (38.9 °C)  Pulse  Av  Min: 66  Max: 91  BP  Min: 116/57  Max: 129/52  SpO2  Av.6 %  Min: 90 %  Max: 93 %    24 hour I/O    Intake/Output Summary (Last 24 hours) at 2021 1241  Last data filed at 2021 0544  Gross per 24 hour   Intake 50 ml   Output 700 ml   Net -650 ml     Current Facility-Administered Medications   Medication Dose Route Frequency Provider Last Rate Last Admin    perflutren lipid microspheres (DEFINITY) injection 1.65 mg  1.5 mL IntraVENous ONCE PRN JENN Smith CNP        magnesium oxide (MAG-OX) tablet 400 mg  400 mg Oral BID Merle Dancer, MD   400 mg at 21 1036    levoFLOXacin (LEVAQUIN) 250 MG/50ML infusion 250 mg  250 mg IntraVENous Q24H Mehdi Linda MD   Stopped at 21 1802    aspirin chewable tablet 324 mg  324 mg Oral Once Ann Montana PA-C        glucose (GLUTOSE) 40 % oral gel 15 g  15 g Oral PRN Sayda Danielle, APRN - CNP        dextrose 50 % IV solution  12.5 g IntraVENous PRN Sayda Friedmanasant, APRN - CNP        glucagon (rDNA) injection 1 mg  1 mg IntraMUSCular PRN Sayda Friedmanasant, APRN - CNP        dextrose 5 % solution  100 mL/hr IntraVENous PRN JENN Collazo - EVI        insulin lispro (HUMALOG) injection vial 0-12 Units  0-12 Units SubCUTAneous TID WC J Luis Captain, APRN - CNP   2 Units at 09/21/21 6244    insulin lispro (HUMALOG) injection vial 0-6 Units  0-6 Units SubCUTAneous Nightly J Luis Harris, APRN - CNP   4 Units at 09/20/21 2049    sodium chloride flush 0.9 % injection 5-40 mL  5-40 mL IntraVENous 2 times per day J Luis Harris, APRN - CNP   10 mL at 09/22/21 1035    sodium chloride flush 0.9 % injection 5-40 mL  5-40 mL IntraVENous PRN J Luis Harris, APRN - CNP        enoxaparin (LOVENOX) injection 30 mg  30 mg SubCUTAneous BID J Luis Harris, APRN - CNP   30 mg at 09/20/21 3535    ondansetron (ZOFRAN-ODT) disintegrating tablet 4 mg  4 mg Oral Q8H PRN J Luis Harris, APRN - CNP        Or    ondansetron [facility]) injection 4 mg  4 mg IntraVENous Q6H PRN J Luis Harris, APRN - CNP        polyethylene glycol (GLYCOLAX) packet 17 g  17 g Oral Daily PRN J Luis Harris, APRN - CNP        famotidine (PEPCID) tablet 20 mg  20 mg Oral BID J Luis Harris, APRN - CNP   20 mg at 09/22/21 1036    guaiFENesin-dextromethorphan (ROBITUSSIN DM) 100-10 MG/5ML syrup 5 mL  5 mL Oral Q4H PRN J Luis Harris, APRN - CNP        Vitamin D (CHOLECALCIFEROL) tablet 2,000 Units  2,000 Units Oral Daily J Luis Harris, APRN - CNP   2,000 Units at 09/22/21 1036    0.9 % sodium chloride infusion  25 mL IntraVENous PRN J Luis Harris, APRN - CNP        acetaminophen (TYLENOL) tablet 650 mg  650 mg Oral Q6H PRN J Luis Harris, APRN - CNP   650 mg at 09/22/21 8189    Or    acetaminophen (TYLENOL) suppository 650 mg  650 mg Rectal Q6H PRN J Luis Harris, APRN - CNP        aspirin chewable tablet 81 mg  81 mg Oral Daily J Luis Harris, APRN - CNP   81 mg at 09/22/21 1037    atorvastatin (LIPITOR) tablet 40 mg  40 mg Oral Nightly JENN Lewis CNP   40 mg at 09/21/21 2118    melatonin disintegrating tablet 5 mg  5 mg Oral Nightly JENN Liu - 5  LVEF 65%  PLAN  1. No significant CAD (only <10% in prox RCA)  2. CP likley from HTn and stress, possibly from coronary kinking. Lab Reviewed:     Renal Profile:  Lab Results   Component Value Date    CREATININE 0.9 09/22/2021    BUN 18 09/22/2021     09/22/2021    K 3.7 09/22/2021    K 4.9 09/20/2021     09/22/2021    CO2 25 09/22/2021     CBC:    Lab Results   Component Value Date    WBC 3.3 09/22/2021    RBC 4.10 09/22/2021    HGB 11.5 09/22/2021    HCT 36.7 09/22/2021    MCV 89.7 09/22/2021    RDW 23.9 09/22/2021     09/22/2021     BNP:  No results found for: PROBNP  Fasting Lipid Panel:    Lab Results   Component Value Date    CHOL 139 12/11/2015    HDL 57 12/11/2015    TRIG 113 12/11/2015     Cardiac Enzymes:  CK/MbTroponin  Lab Results   Component Value Date    TROPONINI <0.01 09/19/2021     PT/ INR   Lab Results   Component Value Date    INR 1.02 10/30/2014    PROTIME 11.0 10/30/2014     PTT No results found for: PTT   Lab Results   Component Value Date    MG 1.60 03/27/2015      Lab Results   Component Value Date    TSH 1.49 04/03/2013     All labs and imaging reviewed today    Assessment:  LBBB: new  Cardiomyopathy, consider takotsubo vs ischemic: new diagnosis, EF 30-35% on echo 9/2021  CAD: mild on angiogram 2014  Symptomatic PVCs  HTN: stable  DM  Metabolic acidosis  THEO  Pneumonia  Fever  Thrombocytopenia    Plan:   1. Reviewed echo with Dr. Abimael Dial. Plan on [facility] 9/24/2021 to evaluate for obstructive CAD pending renal function and pneumonia stability. She needs to be afebrile for 24 hours. 2. Add toprol for cardiomyopathy  3. Holding ACE/ARB due to THEO on presentation  4. Continue aspirin, statin, magnesium    More than 45 minutes of time was spent in direct patient contact during this visit, more than 50% of which was spent educating regarding CAD, procedural details and echo findings.     Caroline Tarango, APRN-CNP  [facility]  [phone]

## 2021-09-22 NOTE — PROGRESS NOTES
Nephrology Progress  Note                                                                                                                                                                                                                                                                                                                                                               Office : [phone]     Fax :[phone]              Patient's Name: Wilmer David  11:56 AM  9/22/2021    Reason for Consult:  Metabolic acidosis , hyperkalemia   Requesting Physician:  Gisela Ovalle      Chief Complaint:  Chest pain       Interval History :    No diarrhea  No sob  No chest pain  No fever          History of Present Ilness:    Wilmer David is a 66 y.o. female with PMH of CAD  who presented to [facility] with feelings of sob, cough, fatigue. Malaise  and mild chest pain.       Pt found to have mild THEO, CXR demonstrated small left pna. VSS.  No hypoxia   ECG demonstrated New LBBB       Nephrology consult for management of hyperkalemia and metabolic acidosis       Past Medical History:   Diagnosis Date    Arthritis     Asthma     Diabetes mellitus ([address]     type 2, takes PO meds    History of palpitations     Hyperlipidemia     Hypertension        Past Surgical History:   Procedure Laterality Date    CATARACT REMOVAL WITH IMPLANT Right 10/18/2017    entered to epic from h&P    CATARACT REMOVAL WITH IMPLANT Left 11/08/2017    CHOLECYSTECTOMY      COLONOSCOPY      CORONARY ANGIOPLASTY  10/30/14    CYST REMOVAL      from right wrist     ENDOSCOPY, COLON, DIAGNOSTIC      GASTRIC BYPASS SURGERY  2005    HERNIA REPAIR      HYSTERECTOMY      age 30    OTHER SURGICAL HISTORY  CYSTOSCOPY, RIGHT URETERAL STONE MANIPULATION WITH RIGHT    SHOULDER ARTHROPLASTY Left 12/15/2016    LEFT PROXIMAL HUMERUS OPEN REDUCTION INTERNAL FIXATION     TONSILLECTOMY         Family History   Problem Relation Age of Onset  Heart Attack Maternal Grandmother         reports that she has never smoked. She has never used smokeless tobacco. She reports that she does not drink alcohol and does not use drugs.     Allergies:  Morphine, Sulfamethoxazole, Trimethoprim, Alendronate, Bactrim [sulfamethoxazole-trimethoprim], Buspirone, Calcitonin (salmon), Demerol hcl [meperidine], Dust mite extract, Esomeprazole magnesium, Glipizide, Metformin, Mometasone, Omeprazole, Other, Oxycodone, Oxycodone-acetaminophen, Pcn [penicillins], Percocet [oxycodone-acetaminophen], Prednisone, Propoxyphene, Ranitidine, Sulfa antibiotics, Toradol [ketorolac tromethamine], Tramadol, and Zantac [ranitidine hcl]    Current Medications:    perflutren lipid microspheres (DEFINITY) injection 1.65 mg, ONCE PRN  magnesium oxide (MAG-OX) tablet 400 mg, BID  levoFLOXacin (LEVAQUIN) 250 MG/50ML infusion 250 mg, Q24H  aspirin chewable tablet 324 mg, Once  glucose (GLUTOSE) 40 % oral gel 15 g, PRN  dextrose 50 % IV solution, PRN  glucagon (rDNA) injection 1 mg, PRN  dextrose 5 % solution, PRN  insulin lispro (HUMALOG) injection vial 0-12 Units, TID WC  insulin lispro (HUMALOG) injection vial 0-6 Units, Nightly  sodium chloride flush 0.9 % injection 5-40 mL, 2 times per day  sodium chloride flush 0.9 % injection 5-40 mL, PRN  enoxaparin (LOVENOX) injection 30 mg, BID  ondansetron (ZOFRAN-ODT) disintegrating tablet 4 mg, Q8H PRN   Or  ondansetron (ZOFRAN) injection 4 mg, Q6H PRN  polyethylene glycol (GLYCOLAX) packet 17 g, Daily PRN  famotidine (PEPCID) tablet 20 mg, BID  guaiFENesin-dextromethorphan (ROBITUSSIN DM) 100-10 MG/5ML syrup 5 mL, Q4H PRN  Vitamin D (CHOLECALCIFEROL) tablet 2,000 Units, Daily  0.9 % sodium chloride infusion, PRN  acetaminophen (TYLENOL) tablet 650 mg, Q6H PRN   Or  acetaminophen (TYLENOL) suppository 650 mg, Q6H PRN  aspirin chewable tablet 81 mg, Daily  atorvastatin (LIPITOR) tablet 40 mg, Nightly  melatonin disintegrating tablet 5 mg, Nightly  cyclobenzaprine (FLEXERIL) tablet 10 mg, TID PRN        Review of Systems:   14 point ROS obtained but were negative except mentioned in HPI      Physical exam:     Vitals:  /70   Pulse 78   Temp 97.9 °F (36.6 °C) (Oral)   Resp 16   Ht 5' 3\" (1.6 m)   Wt 159 lb 8 oz (72.3 kg)   SpO2 92%   BMI 28.25 kg/m²   Constitutional:  OAA X3 NAD  Skin: no rash, turgor wnl  Heent:  eomi, mmm  Neck: no bruits or jvd noted  Cardiovascular:  S1, S2 without m/r/g  Respiratory: CTA B without w/r/r  Abdomen:  +bs, soft, nt, nd  Ext: no  lower extremity edema  Psychiatric: mood and affect appropriate  Musculoskeletal:  Rom, muscular strength intact    Data:   Labs:  CBC:   Recent Labs     09/20/21  0540   WBC 4.3   HGB 11.1*   *     BMP:    Recent Labs     09/20/21  1300 09/20/21  1933 09/21/21  1136   * 134* 134*   K 5.7* 4.9 3.2*    102 96*   CO2 15* 18* 28   BUN 33* 39* 23*   CREATININE 1.2 1.2 1.1   GLUCOSE 193* 362* 135*     Ca/Mg/Phos:   Recent Labs     09/20/21  1300 09/20/21  1933 09/21/21  1136   CALCIUM 8.4 8.2* 8.3     Hepatic:   No results for input(s): AST, ALT, ALB, BILITOT, ALKPHOS in the last 72 hours. Troponin:   Recent Labs     09/19/21  1424 09/19/21  1747   TROPONINI <0.01 <0.01     BNP: No results for input(s): BNP in the last 72 hours. Lipids: No results for input(s): CHOL, TRIG, HDL, LDLCALC, LABVLDL in the last 72 hours. ABGs: No results for input(s): PHART, PO2ART, JIO4WCP in the last 72 hours. INR: No results for input(s): INR in the last 72 hours.   UA:  Recent Labs     09/19/21  1202   COLORU Yellow   CLARITYU Clear   GLUCOSEU >=1000*   BILIRUBINUR Negative   KETUA 15*   SPECGRAV 1.010   BLOODU TRACE-INTACT*   PHUR 5.5   PROTEINU Negative   UROBILINOGEN 0.2   NITRU Negative   LEUKOCYTESUR Negative   LABMICR YES   URINETYPE NotGiven      Urine Microscopic:   Recent Labs     09/19/21  1202   WBCUA 0-2   RBCUA 0-2   EPIU 2-5     Urine Culture: No results for input(s): Marques Su in the last 72 hours. Urine Chemistry: No results for input(s): Arleen Currie, ELIZABETH, NAVIKA in the last 72 hours. IMAGING:  XR CHEST PORTABLE   Final Result   Increasing airspace opacity left lower lobe         XR CHEST PORTABLE   Final Result   Small left basilar pneumonia. XR HIP RIGHT (2-3 VIEWS)   Final Result   No acute finding of the right hip. Assessment/Plan     1. AGMA    Anion Gap :   15   -----> 17  ----> 14    Serum Bicarb : 19   ------> 15 -----> 18    Denies diarrhea   Denies vomiting    S/p IVF  NS  On 9/19 , 9/20     Plan      Get BMP and CBC    Further recommendation based on BMP results                  2. Hyperkalemia in setting of metabolic acidosis     F/u BMP      S/p lokelma     Serum K   5.1 ----> 5.7 ------> 4.9     Avoid ACEI or ARB    Low K diet     3 THEO    Serum cr improved from 1.4 to 1.2    Avoid contrast    Avoid ACEI OR ARB      3. CAD    4.  Aortic sclerosis     5 HTN    6 DLP                     Thank you for allowing us to participate in care of Joshua Zepeda MD  Feel free to contact me   [facility] [address]  Office : [phone]  Fax :[phone]

## 2021-09-22 NOTE — PROGRESS NOTES
Pharmacy Note  Vancomycin Consult    Delmy Martinez is a 66 y.o. female started on Vancomycin for CAP; consult received from Dr. Jose R Parikh  to manage therapy. Also receiving the following antibiotics: Cefepime. Allergies:  Morphine, Sulfamethoxazole, Trimethoprim, Alendronate, Bactrim [sulfamethoxazole-trimethoprim], Buspirone, Calcitonin (salmon), Demerol hcl [meperidine], Dust mite extract, Esomeprazole magnesium, Glipizide, Metformin, Mometasone, Omeprazole, Other, Oxycodone, Oxycodone-acetaminophen, Pcn [penicillins], Percocet [oxycodone-acetaminophen], Prednisone, Propoxyphene, Ranitidine, Sulfa antibiotics, Toradol [ketorolac tromethamine], Tramadol, and Zantac [ranitidine hcl]       Recent Labs     09/21/21  1136 09/22/21  1144   CREATININE 1.1 0.9       Recent Labs     09/20/21  0540 09/22/21  1144   WBC 4.3 3.3*       Estimated Creatinine Clearance: 53 mL/min (based on SCr of 0.9 mg/dL). Intake/Output Summary (Last 24 hours) at 9/22/2021 1447  Last data filed at 9/22/2021 0544  Gross per 24 hour   Intake 50 ml   Output 700 ml   Net -650 ml       Wt Readings from Last 1 Encounters:   09/22/21 159 lb 8 oz (72.3 kg)         Body mass index is 28.25 kg/m². Culture Date      Source                       Results  pending    Loading dose (critically ill or in ICU, require dialysis or renal replacement therapy): Vancomycin 25 mg/kg IVPB x 1 (maximum 3000 mg). Maintenance dose: 15 mg/kg (maximum: 2000 mg/dose and 4500 mg/day) starting at the next dosing interval determined by renal function  Pulse dose: fluctuating renal function, THEO, ESRD   Goal Vancomycin trough: 10-15 mcg/mL or 15-20 mcg/mL   Goal Vancomycin AUC: 400-600     Assessment/Plan:  Will initiate Vancomycin with a one time loading dose of 2000 mg x1, followed by 1250 mg IV every 24  hours. Calculated .  Vancomycin trough ordered for 9/24  1000 Timing of trough level will be determined based on culture results, renal function, and clinical response.      Thank you for the consult  \   Momo Timmons PharmD 09/22/21 2:52 PM

## 2021-09-22 NOTE — PLAN OF CARE
Pt remains free from falls during this shift. Pt a/o and calls out appropriately. Bed in lowest position and wheels locked. Call light within reach. Bedside table within reach. Problem: Falls - Risk of:  Goal: Will remain free from falls  Description: Will remain free from falls  9/21/2021 2150 by Jez Cheung RN  Outcome: Ongoing  9/21/2021 1325 by Adrianna Cochran RN  Outcome: Ongoing  Note: Pt remains safe. Uses call light appropriately to call out for assistance. Bed locked in lowest position; side rails 2/4; call light and bedside table within reach of pt.    Goal: Absence of physical injury  Description: Absence of physical injury  Outcome: Ongoing

## 2021-09-22 NOTE — CARE COORDINATION
Chart reviewed day 3. Care managed per nephrology, cards and IM. Patient with fever continued at 100.0 over night. IPTA following for possible needs.  Kristi Ferraro RN

## 2021-09-22 NOTE — PROGRESS NOTES
16   Ht 5' 3\" (1.6 m)   Wt 159 lb 8 oz (72.3 kg)   SpO2 90%   BMI 28.25 kg/m²     General appearance: No apparent distress, appears stated age and cooperative. On oxygen  HEENT: . Conjunctivae/corneas clear. Neck: Supple, with full range of motion. No jugular venous distention. Trachea midline. Respiratory:  Normal respiratory effort. Reduced air entry, bilaterally without Rales/Wheezes/Rhonchi. Cardiovascular: Regular rate and rhythm with normal S1/S2 without murmurs, rubs or gallops. Abdomen: Soft, non-tender, non-distended with normal bowel sounds. Musculoskeletal: No clubbing, cyanosis or edema bilaterally. Full range of motion without deformity. Skin: Skin color, texture, turgor normal.  No rashes or lesions. Neurologic:  Neurovascularly intact without any focal sensory/motor deficits. Psychiatric: Alert and oriented,   Capillary Refill: Brisk,3 seconds, normal   Peripheral Pulses: +2 palpable, equal bilaterally       Labs:   Recent Labs     09/20/21  0540 09/22/21  1144   WBC 4.3 3.3*   HGB 11.1* 11.5*   HCT 35.9* 36.7   * 102*     Recent Labs     09/20/21  1933 09/21/21  1136 09/22/21  1144   * 134* 134*   K 4.9 3.2* 3.7    96* 100   CO2 18* 28 25   BUN 39* 23* 18   CREATININE 1.2 1.1 0.9   CALCIUM 8.2* 8.3 8.2*     No results for input(s): AST, ALT, BILIDIR, BILITOT, ALKPHOS in the last 72 hours. No results for input(s): INR in the last 72 hours. Recent Labs     09/19/21  1424 09/19/21  1747   TROPONINI <0.01 <0.01       Urinalysis:      Lab Results   Component Value Date    NITRU Negative 09/19/2021    WBCUA 0-2 09/19/2021    RBCUA 0-2 09/19/2021    BLOODU TRACE-INTACT 09/19/2021    SPECGRAV 1.010 09/19/2021    GLUCOSEU >=1000 09/19/2021       Radiology:  XR CHEST PORTABLE   Final Result   Increasing airspace opacity left lower lobe         XR CHEST PORTABLE   Final Result   Small left basilar pneumonia.          XR HIP RIGHT (2-3 VIEWS)   Final Result   No acute finding of the right hip. Assessment/Plan:    Active Hospital Problems    Diagnosis     Abnormal ECG [R94.31]     Aortic valve sclerosis [I35.8]     PNA (pneumonia) [J18.9]     Pneumonia [J18.9]     Coronary artery disease involving native coronary artery of native heart without angina pectoris [I25.10]     PVC (premature ventricular contraction) [I49.3]     Hypertension [I10]     Hyperlipidemia [E78.5]     Chest pain [R07.9]      67 yo female vaccinated presents with mild chest pain, fatigue, sob, general aches and pains pt concerned for COVID      COVID -19-ruled out rapid and PCR are negative  -Is asymptomatic     PNA-left base, -worsening with acute hypoxic respiratory failure and fever-currently on oxygen, broaden the antibiotics Vanco and cefepime    Asthma: Exacerbation-resolved s/p steroid     THEO: pre renal, low volume, poor appetite last few days and was taking BP meds  ACE. HCTZ combo, hold for now   Repeat BMP pending-improved     HTN: controlled: cont BB only for now      DM: reports controlled   - hold orals, SSI while IP, titrate as needed   - A1C pending      Mild chest pain- low suspicion of ACS, however does have CAD hx and new lBBB on ECG. Add asa and ask Cardiology to see for ischemic work up needs   - initial trop negtaiv, serial   Cardiology input appreciated and following  Patient was awaiting for cardiac cath on 24 September as an outpatient to evaluate for obstructive coronary artery, may wait till infection clears,       HLD- cont statin     A -a gap acidosis / hyperkalemia - \s/p hco3 infusion, [facility] nephrology input appreciated - resolved       DVT Prophylaxis: Lovenox  Diet: ADULT DIET;  Regular; 4 carb choices (60 gm/meal)  Adult Oral Nutrition Supplement; Diabetic Oral Supplement  Diet NPO  Code Status: Full Code    PT/OT Eval Status: Home with home care    Dispo -pending improvement of pneumonia    Suzanne Oshea MD

## 2021-09-23 ENCOUNTER — APPOINTMENT (OUTPATIENT)
Dept: INTERVENTIONAL RADIOLOGY/VASCULAR | Age: 71
DRG: 871 | End: 2021-09-23
Payer: MEDICARE

## 2021-09-23 ENCOUNTER — APPOINTMENT (OUTPATIENT)
Dept: CT IMAGING | Age: 71
DRG: 871 | End: 2021-09-23
Payer: MEDICARE

## 2021-09-23 LAB
ALBUMIN SERPL-MCNC: 2.8 G/DL (ref 3.4–5)
ALP BLD-CCNC: 49 U/L (ref 40–129)
ALT SERPL-CCNC: 34 U/L (ref 10–40)
ANION GAP SERPL CALCULATED.3IONS-SCNC: 11 MMOL/L (ref 3–16)
AST SERPL-CCNC: 68 U/L (ref 15–37)
BASOPHILS ABSOLUTE: 0 K/UL (ref 0–0.2)
BASOPHILS RELATIVE PERCENT: 0.1 %
BILIRUB SERPL-MCNC: 0.6 MG/DL (ref 0–1)
BILIRUBIN DIRECT: <0.2 MG/DL (ref 0–0.3)
BILIRUBIN, INDIRECT: ABNORMAL MG/DL (ref 0–1)
BUN BLDV-MCNC: 14 MG/DL (ref 7–20)
CALCIUM SERPL-MCNC: 8.1 MG/DL (ref 8.3–10.6)
CHLORIDE BLD-SCNC: 98 MMOL/L (ref 99–110)
CO2: 21 MMOL/L (ref 21–32)
CREAT SERPL-MCNC: 0.9 MG/DL (ref 0.6–1.2)
EOSINOPHILS ABSOLUTE: 0 K/UL (ref 0–0.6)
EOSINOPHILS RELATIVE PERCENT: 0.2 %
GFR AFRICAN AMERICAN: >60
GFR NON-AFRICAN AMERICAN: >60
GLUCOSE BLD-MCNC: 106 MG/DL (ref 70–99)
GLUCOSE BLD-MCNC: 108 MG/DL (ref 70–99)
GLUCOSE BLD-MCNC: 115 MG/DL (ref 70–99)
GLUCOSE BLD-MCNC: 129 MG/DL (ref 70–99)
GLUCOSE BLD-MCNC: 164 MG/DL (ref 70–99)
HCT VFR BLD CALC: 36.3 % (ref 36–48)
HEMOGLOBIN: 11.5 G/DL (ref 12–16)
LYMPHOCYTES ABSOLUTE: 0.8 K/UL (ref 1–5.1)
LYMPHOCYTES RELATIVE PERCENT: 26.4 %
MCH RBC QN AUTO: 28 PG (ref 26–34)
MCHC RBC AUTO-ENTMCNC: 31.5 G/DL (ref 31–36)
MCV RBC AUTO: 88.9 FL (ref 80–100)
MONOCYTES ABSOLUTE: 0.1 K/UL (ref 0–1.3)
MONOCYTES RELATIVE PERCENT: 4.5 %
NEUTROPHILS ABSOLUTE: 2 K/UL (ref 1.7–7.7)
NEUTROPHILS RELATIVE PERCENT: 68.8 %
PDW BLD-RTO: 23.7 % (ref 12.4–15.4)
PERFORMED ON: ABNORMAL
PLATELET # BLD: 97 K/UL (ref 135–450)
PLATELET SLIDE REVIEW: ABNORMAL
PMV BLD AUTO: 8.7 FL (ref 5–10.5)
POTASSIUM SERPL-SCNC: 4.4 MMOL/L (ref 3.5–5.1)
RBC # BLD: 4.09 M/UL (ref 4–5.2)
SLIDE REVIEW: ABNORMAL
SODIUM BLD-SCNC: 130 MMOL/L (ref 136–145)
TOTAL PROTEIN: 5.9 G/DL (ref 6.4–8.2)
WBC # BLD: 2.9 K/UL (ref 4–11)

## 2021-09-23 PROCEDURE — 99222 1ST HOSP IP/OBS MODERATE 55: CPT | Performed by: INTERNAL MEDICINE

## 2021-09-23 PROCEDURE — 2580000003 HC RX 258: Performed by: HOSPITALIST

## 2021-09-23 PROCEDURE — 6370000000 HC RX 637 (ALT 250 FOR IP): Performed by: NURSE PRACTITIONER

## 2021-09-23 PROCEDURE — 36415 COLL VENOUS BLD VENIPUNCTURE: CPT

## 2021-09-23 PROCEDURE — 6370000000 HC RX 637 (ALT 250 FOR IP): Performed by: INTERNAL MEDICINE

## 2021-09-23 PROCEDURE — 6360000002 HC RX W HCPCS: Performed by: NURSE PRACTITIONER

## 2021-09-23 PROCEDURE — 94761 N-INVAS EAR/PLS OXIMETRY MLT: CPT

## 2021-09-23 PROCEDURE — 99233 SBSQ HOSP IP/OBS HIGH 50: CPT | Performed by: HOSPITALIST

## 2021-09-23 PROCEDURE — 2700000000 HC OXYGEN THERAPY PER DAY

## 2021-09-23 PROCEDURE — 71250 CT THORAX DX C-: CPT

## 2021-09-23 PROCEDURE — 80048 BASIC METABOLIC PNL TOTAL CA: CPT

## 2021-09-23 PROCEDURE — 2580000003 HC RX 258: Performed by: INTERNAL MEDICINE

## 2021-09-23 PROCEDURE — 1200000000 HC SEMI PRIVATE

## 2021-09-23 PROCEDURE — 85025 COMPLETE CBC W/AUTO DIFF WBC: CPT

## 2021-09-23 PROCEDURE — 80076 HEPATIC FUNCTION PANEL: CPT

## 2021-09-23 PROCEDURE — 6360000002 HC RX W HCPCS: Performed by: INTERNAL MEDICINE

## 2021-09-23 PROCEDURE — 99232 SBSQ HOSP IP/OBS MODERATE 35: CPT | Performed by: NURSE PRACTITIONER

## 2021-09-23 RX ORDER — LIDOCAINE HYDROCHLORIDE 10 MG/ML
5 INJECTION, SOLUTION INFILTRATION; PERINEURAL ONCE
Status: DISCONTINUED | OUTPATIENT
Start: 2021-09-23 | End: 2021-09-24

## 2021-09-23 RX ORDER — SODIUM CHLORIDE 0.9 % (FLUSH) 0.9 %
5-40 SYRINGE (ML) INJECTION EVERY 12 HOURS SCHEDULED
Status: DISCONTINUED | OUTPATIENT
Start: 2021-09-23 | End: 2021-09-28 | Stop reason: HOSPADM

## 2021-09-23 RX ORDER — SODIUM CHLORIDE 9 MG/ML
INJECTION, SOLUTION INTRAVENOUS CONTINUOUS
Status: DISCONTINUED | OUTPATIENT
Start: 2021-09-23 | End: 2021-09-24

## 2021-09-23 RX ORDER — SODIUM CHLORIDE 9 MG/ML
25 INJECTION, SOLUTION INTRAVENOUS PRN
Status: DISCONTINUED | OUTPATIENT
Start: 2021-09-23 | End: 2021-09-28 | Stop reason: HOSPADM

## 2021-09-23 RX ORDER — DIPHENHYDRAMINE HCL 25 MG
25 TABLET ORAL ONCE
Status: COMPLETED | OUTPATIENT
Start: 2021-09-23 | End: 2021-09-23

## 2021-09-23 RX ORDER — AZITHROMYCIN 250 MG/1
500 TABLET, FILM COATED ORAL DAILY
Status: DISCONTINUED | OUTPATIENT
Start: 2021-09-23 | End: 2021-09-27

## 2021-09-23 RX ORDER — SODIUM CHLORIDE 0.9 % (FLUSH) 0.9 %
5-40 SYRINGE (ML) INJECTION PRN
Status: DISCONTINUED | OUTPATIENT
Start: 2021-09-23 | End: 2021-09-24

## 2021-09-23 RX ADMIN — SODIUM CHLORIDE: 9 INJECTION, SOLUTION INTRAVENOUS at 11:43

## 2021-09-23 RX ADMIN — MAGNESIUM GLUCONATE 500 MG ORAL TABLET 400 MG: 500 TABLET ORAL at 20:53

## 2021-09-23 RX ADMIN — MAGNESIUM GLUCONATE 500 MG ORAL TABLET 400 MG: 500 TABLET ORAL at 10:12

## 2021-09-23 RX ADMIN — Medication 2000 UNITS: at 10:12

## 2021-09-23 RX ADMIN — MEROPENEM 1000 MG: 1 INJECTION, POWDER, FOR SOLUTION INTRAVENOUS at 11:47

## 2021-09-23 RX ADMIN — Medication 10 ML: at 10:13

## 2021-09-23 RX ADMIN — VANCOMYCIN HYDROCHLORIDE 1250 MG: 10 INJECTION, POWDER, LYOPHILIZED, FOR SOLUTION INTRAVENOUS at 03:46

## 2021-09-23 RX ADMIN — ACETAMINOPHEN 650 MG: 325 TABLET ORAL at 20:53

## 2021-09-23 RX ADMIN — AZITHROMYCIN DIHYDRATE 500 MG: 250 TABLET, FILM COATED ORAL at 20:53

## 2021-09-23 RX ADMIN — Medication 5 MG: at 20:53

## 2021-09-23 RX ADMIN — METOPROLOL SUCCINATE 25 MG: 25 TABLET, EXTENDED RELEASE ORAL at 10:12

## 2021-09-23 RX ADMIN — MEROPENEM 1000 MG: 1 INJECTION, POWDER, FOR SOLUTION INTRAVENOUS at 19:20

## 2021-09-23 RX ADMIN — DIPHENHYDRAMINE HCL 25 MG: 25 TABLET ORAL at 00:41

## 2021-09-23 RX ADMIN — ATORVASTATIN CALCIUM 40 MG: 10 TABLET, FILM COATED ORAL at 20:53

## 2021-09-23 RX ADMIN — FAMOTIDINE 20 MG: 20 TABLET ORAL at 20:53

## 2021-09-23 RX ADMIN — FAMOTIDINE 20 MG: 20 TABLET ORAL at 10:12

## 2021-09-23 RX ADMIN — ASPIRIN 81 MG: 81 TABLET, CHEWABLE ORAL at 10:12

## 2021-09-23 RX ADMIN — ACETAMINOPHEN 650 MG: 325 TABLET ORAL at 10:10

## 2021-09-23 ASSESSMENT — PAIN SCALES - GENERAL
PAINLEVEL_OUTOF10: 0
PAINLEVEL_OUTOF10: 0
PAINLEVEL_OUTOF10: 3

## 2021-09-23 ASSESSMENT — ENCOUNTER SYMPTOMS
GASTROINTESTINAL NEGATIVE: 1
RESPIRATORY NEGATIVE: 1

## 2021-09-23 ASSESSMENT — PAIN DESCRIPTION - ORIENTATION: ORIENTATION: RIGHT

## 2021-09-23 ASSESSMENT — PAIN - FUNCTIONAL ASSESSMENT: PAIN_FUNCTIONAL_ASSESSMENT: PREVENTS OR INTERFERES SOME ACTIVE ACTIVITIES AND ADLS

## 2021-09-23 ASSESSMENT — PAIN DESCRIPTION - PAIN TYPE: TYPE: ACUTE PAIN

## 2021-09-23 ASSESSMENT — PAIN DESCRIPTION - LOCATION: LOCATION: HIP

## 2021-09-23 NOTE — PROGRESS NOTES
filed at 9/23/2021 0847  Gross per 24 hour   Intake 180 ml   Output 1400 ml   Net -1220 ml       Physical Exam Performed:    /63   Pulse 65   Temp 98.4 °F (36.9 °C) (Oral)   Resp 16   Ht 5' 3\" (1.6 m)   Wt 158 lb 1.6 oz (71.7 kg)   SpO2 91%   BMI 28.01 kg/m²     General appearance: No apparent distress, appears stated age and cooperative. On oxygen  HEENT: . Conjunctivae/corneas clear. Neck: Supple, with full range of motion. No jugular venous distention. Trachea midline. Respiratory:  Normal respiratory effort. Reduced air entry, bilaterally without Rales/Wheezes/Rhonchi. Cardiovascular: Regular rate and rhythm with normal S1/S2 without murmurs, rubs or gallops. Abdomen: Soft, non-tender, non-distended with normal bowel sounds. Musculoskeletal: No clubbing, cyanosis or edema bilaterally. Full range of motion without deformity. Skin: Skin color, texture, turgor normal.  No rashes or lesions. Neurologic:  Neurovascularly intact without any focal sensory/motor deficits. Psychiatric: Alert and oriented,   Capillary Refill: Brisk,3 seconds, normal   Peripheral Pulses: +2 palpable, equal bilaterally       Labs:   Recent Labs     09/22/21  1144 09/23/21  0603   WBC 3.3* 2.9*   HGB 11.5* 11.5*   HCT 36.7 36.3   * 97*     Recent Labs     09/21/21  1136 09/22/21  1144 09/23/21  0603   * 134* 130*   K 3.2* 3.7 4.4   CL 96* 100 98*   CO2 28 25 21   BUN 23* 18 14   CREATININE 1.1 0.9 0.9   CALCIUM 8.3 8.2* 8.1*     No results for input(s): AST, ALT, BILIDIR, BILITOT, ALKPHOS in the last 72 hours. No results for input(s): INR in the last 72 hours. No results for input(s): Wayna Khat in the last 72 hours.     Urinalysis:      Lab Results   Component Value Date    NITRU Negative 09/19/2021    WBCUA 0-2 09/19/2021    RBCUA 0-2 09/19/2021    BLOODU TRACE-INTACT 09/19/2021    SPECGRAV 1.010 09/19/2021    GLUCOSEU >=1000 09/19/2021       Radiology:  XR CHEST PORTABLE   Final Result Increasing airspace opacity left lower lobe         XR CHEST PORTABLE   Final Result   Small left basilar pneumonia. XR HIP RIGHT (2-3 VIEWS)   Final Result   No acute finding of the right hip. Assessment/Plan:    Active Hospital Problems    Diagnosis     Abnormal ECG [R94.31]     Aortic valve sclerosis [I35.8]     PNA (pneumonia) [J18.9]     Pneumonia [J18.9]     Coronary artery disease involving native coronary artery of native heart without angina pectoris [I25.10]     PVC (premature ventricular contraction) [I49.3]     Hypertension [I10]     Hyperlipidemia [E78.5]     Chest pain [R07.9]      67 yo female vaccinated presents with mild chest pain, fatigue, sob, general aches and pains pt concerned for COVID      COVID -19-ruled out rapid and PCR are negative  -Is asymptomatic     PNA-left base, -worsening with acute hypoxic respiratory failure and fever? HAP-currently on oxygen, broaden the antibiotics Vanco and cefepime  Persistent fever   ID consulted     Asthma: Exacerbation-resolved s/p steroid     THEO: pre renal, low volume, poor appetite last few days and was taking BP meds  ACE. HCTZ combo, hold for now   Repeat BMP pending-improved     HTN: controlled: cont BB only for now      DM: reports controlled   - hold orals, SSI while IP, titrate as needed   - A1C pending      Mild chest pain- low suspicion of ACS, however does have CAD hx and new lBBB on ECG. Add asa and ask Cardiology to see for ischemic work up needs   - initial trop negtaiv, serial   Cardiology input appreciated and following  Patient was awaiting for cardiac cath on 24 September as an outpatient to evaluate for obstructive coronary artery, may wait till infection clears,       HLD- cont statin     A -a gap acidosis / hyperkalemia - \s/p hco3 infusion, [facility] nephrology input appreciated - resolved       DVT Prophylaxis: Lovenox  Diet: ADULT DIET;  Regular; 4 carb choices (60 gm/meal)  Adult Oral Nutrition Supplement; Diabetic Oral Supplement  Diet NPO  Code Status: Full Code    PT/OT Eval Status: Home with home care    Dispo -pending improvement of pneumonia    Lm Francis MD

## 2021-09-23 NOTE — PROGRESS NOTES
Nephrology Progress  Note                                                                                                                                                                                                                                                                                                                                                               Office : [phone]     Fax :[phone]              Patient's Name: Elizabeth Perales  11:56 AM  9/23/2021    Reason for Consult:  Metabolic acidosis , hyperkalemia   Requesting Physician:  Giovanna Simon      Chief Complaint:  Chest pain       Interval History :    Neutropenia +  BP soft    No diarrhea  No sob  No chest pain  No fever          History of Present Ilness:    Elizabeth Perales is a 66 y.o. female with PMH of CAD  who presented to [facility] with feelings of sob, cough, fatigue. Malaise  and mild chest pain.       Pt found to have mild THEO, CXR demonstrated small left pna. VSS.  No hypoxia   ECG demonstrated New LBBB       Nephrology consult for management of hyperkalemia and metabolic acidosis       Past Medical History:   Diagnosis Date    Arthritis     Asthma     Diabetes mellitus ([address]     type 2, takes PO meds    History of palpitations     Hyperlipidemia     Hypertension        Past Surgical History:   Procedure Laterality Date    CATARACT REMOVAL WITH IMPLANT Right 10/18/2017    entered to epic from h&P    CATARACT REMOVAL WITH IMPLANT Left 11/08/2017    CHOLECYSTECTOMY      COLONOSCOPY      CORONARY ANGIOPLASTY  10/30/14    CYST REMOVAL      from right wrist     ENDOSCOPY, COLON, DIAGNOSTIC      GASTRIC BYPASS SURGERY  2005    HERNIA REPAIR      HYSTERECTOMY      age 30    OTHER SURGICAL HISTORY  CYSTOSCOPY, RIGHT URETERAL STONE MANIPULATION WITH RIGHT    SHOULDER ARTHROPLASTY Left 12/15/2016    LEFT PROXIMAL HUMERUS OPEN REDUCTION INTERNAL FIXATION     TONSILLECTOMY         Family History Problem Relation Age of Onset    Heart Attack Maternal Grandmother         reports that she has never smoked. She has never used smokeless tobacco. She reports that she does not drink alcohol and does not use drugs.     Allergies:  Morphine, Sulfamethoxazole, Trimethoprim, Alendronate, Bactrim [sulfamethoxazole-trimethoprim], Buspirone, Calcitonin (salmon), Demerol hcl [meperidine], Dust mite extract, Esomeprazole magnesium, Glipizide, Metformin, Mometasone, Omeprazole, Other, Oxycodone, Oxycodone-acetaminophen, Pcn [penicillins], Percocet [oxycodone-acetaminophen], Prednisone, Propoxyphene, Ranitidine, Sulfa antibiotics, Toradol [ketorolac tromethamine], Tramadol, and Zantac [ranitidine hcl]    Current Medications:    lidocaine 1 % injection 5 mL, Once  sodium chloride flush 0.9 % injection 5-40 mL, 2 times per day  sodium chloride flush 0.9 % injection 5-40 mL, PRN  0.9 % sodium chloride infusion, PRN  0.9 % sodium chloride infusion, Continuous  metoprolol succinate (TOPROL XL) extended release tablet 25 mg, Daily  vancomycin (VANCOCIN) 1,250 mg in dextrose 5 % 250 mL IVPB, Q24H  meropenem (MERREM) 1,000 mg in sodium chloride 0.9 % 100 mL IVPB (mini-bag), Q8H  perflutren lipid microspheres (DEFINITY) injection 1.65 mg, ONCE PRN  magnesium oxide (MAG-OX) tablet 400 mg, BID  aspirin chewable tablet 324 mg, Once  glucose (GLUTOSE) 40 % oral gel 15 g, PRN  dextrose 50 % IV solution, PRN  glucagon (rDNA) injection 1 mg, PRN  dextrose 5 % solution, PRN  insulin lispro (HUMALOG) injection vial 0-12 Units, TID WC  insulin lispro (HUMALOG) injection vial 0-6 Units, Nightly  sodium chloride flush 0.9 % injection 5-40 mL, 2 times per day  sodium chloride flush 0.9 % injection 5-40 mL, PRN  enoxaparin (LOVENOX) injection 30 mg, BID  ondansetron (ZOFRAN-ODT) disintegrating tablet 4 mg, Q8H PRN   Or  ondansetron (ZOFRAN) injection 4 mg, Q6H PRN  polyethylene glycol (GLYCOLAX) packet 17 g, Daily PRN  famotidine (PEPCID) tablet 20 mg, BID  guaiFENesin-dextromethorphan (ROBITUSSIN DM) 100-10 MG/5ML syrup 5 mL, Q4H PRN  Vitamin D (CHOLECALCIFEROL) tablet 2,000 Units, Daily  0.9 % sodium chloride infusion, PRN  acetaminophen (TYLENOL) tablet 650 mg, Q6H PRN   Or  acetaminophen (TYLENOL) suppository 650 mg, Q6H PRN  aspirin chewable tablet 81 mg, Daily  atorvastatin (LIPITOR) tablet 40 mg, Nightly  melatonin disintegrating tablet 5 mg, Nightly  cyclobenzaprine (FLEXERIL) tablet 10 mg, TID PRN        Review of Systems:   14 point ROS obtained but were negative except mentioned in HPI      Physical exam:     Vitals:  BP (!) 98/55   Pulse 73   Temp 100.3 °F (37.9 °C) (Oral)   Resp 18   Ht 5' 3\" (1.6 m)   Wt 158 lb 1.6 oz (71.7 kg)   SpO2 92%   BMI 28.01 kg/m²   Constitutional:  OAA X3 NAD  Skin: no rash, turgor wnl  Heent:  eomi, mmm  Neck: no bruits or jvd noted  Cardiovascular:  S1, S2 without m/r/g  Respiratory: CTA B without w/r/r  Abdomen:  +bs, soft, nt, nd  Ext: no  lower extremity edema  Psychiatric: mood and affect appropriate  Musculoskeletal:  Rom, muscular strength intact    Data:   Labs:  CBC:   Recent Labs     09/22/21  1144 09/23/21  0603   WBC 3.3* 2.9*   HGB 11.5* 11.5*   * 97*     BMP:    Recent Labs     09/21/21  1136 09/22/21  1144 09/23/21  0603   * 134* 130*   K 3.2* 3.7 4.4   CL 96* 100 98*   CO2 28 25 21   BUN 23* 18 14   CREATININE 1.1 0.9 0.9   GLUCOSE 135* 118* 108*     Ca/Mg/Phos:   Recent Labs     09/21/21  1136 09/22/21  1144 09/23/21  0603   CALCIUM 8.3 8.2* 8.1*     Hepatic:   No results for input(s): AST, ALT, ALB, BILITOT, ALKPHOS in the last 72 hours. Troponin:   No results for input(s): TROPONINI in the last 72 hours. BNP: No results for input(s): BNP in the last 72 hours. Lipids: No results for input(s): CHOL, TRIG, HDL, LDLCALC, LABVLDL in the last 72 hours. ABGs: No results for input(s): PHART, PO2ART, DXG3OGQ in the last 72 hours.   INR: No results for input(s): INR in the last 72

## 2021-09-23 NOTE — PROGRESS NOTES
[address]  Cardiology  Progress Note    Admission date:  2021    Reason for follow up visit: new LBBB, cardiomyopathy    HPI/CC: Ezequiel Paulson is a 66 y.o. female who was admitted 2021 for shortness of breath. Cardiology consulted for new LBBB. Echo showed EF 30-35% with anterior wall motion abnormalities. She has also been treated for metabolic acidosis, pneumonia. Rhythm has been sinus LBBB. Subjective: Generally feels unwell, fatigue and loss of appetite. No chest pain or shortness of breath. Vitals:  Blood pressure (!) 98/55, pulse 73, temperature 100.3 °F (37.9 °C), temperature source Oral, resp. rate 18, height 5' 3\" (1.6 m), weight 158 lb 1.6 oz (71.7 kg), SpO2 92 %, not currently breastfeeding.   Temp  Av.1 °F (37.8 °C)  Min: 97.8 °F (36.6 °C)  Max: 102.9 °F (39.4 °C)  Pulse  Av.8  Min: 65  Max: 79  BP  Min: 98/55  Max: 121/61  SpO2  Av.5 %  Min: 88 %  Max: 92 %    24 hour I/O    Intake/Output Summary (Last 24 hours) at 2021 1211  Last data filed at 2021 0847  Gross per 24 hour   Intake 180 ml   Output 1400 ml   Net -1220 ml     Current Facility-Administered Medications   Medication Dose Route Frequency Provider Last Rate Last Admin    lidocaine 1 % injection 5 mL  5 mL IntraDERmal Once Migdalia Cane A Wendi, DO        sodium chloride flush 0.9 % injection 5-40 mL  5-40 mL IntraVENous 2 times per day Migdalia Cane A Wendi, DO   10 mL at 21 1013    sodium chloride flush 0.9 % injection 5-40 mL  5-40 mL IntraVENous PRN Lamard FAUZIA Petersonzi, DO        0.9 % sodium chloride infusion  25 mL IntraVENous PRN Lamard FAUZIA Petersonzi, DO        0.9 % sodium chloride infusion   IntraVENous Continuous Liz Brian MD 75 mL/hr at 21 1143 New Bag at 21 1143    metoprolol succinate (TOPROL XL) extended release tablet 25 mg  25 mg Oral Daily Kev Clamp, APRN - CNP   25 mg at 21 1012    vancomycin (VANCOCIN) 1,250 mg in dextrose 5 % 250 mL IVPB  1,250 mg IntraVENous Q24H Chely Rinaldi MD   Paused at 09/23/21 0508    meropenem (MERREM) 1,000 mg in sodium chloride 0.9 % 100 mL IVPB (mini-bag)  1,000 mg IntraVENous Q8H Chely Rinaldi  mL/hr at 09/23/21 1147 1,000 mg at 09/23/21 1147    perflutren lipid microspheres (DEFINITY) injection 1.65 mg  1.5 mL IntraVENous ONCE PRN Pan Genera, APRN - CNP        magnesium oxide (MAG-OX) tablet 400 mg  400 mg Oral BID Reyna Austin MD   400 mg at 09/23/21 1012    aspirin chewable tablet 324 mg  324 mg Oral Once Stephanie Carreon PA-C        glucose (GLUTOSE) 40 % oral gel 15 g  15 g Oral PRN Roney Ego, APRN - CNP        dextrose 50 % IV solution  12.5 g IntraVENous PRN [place] Ego, APRN - CNP        glucagon (rDNA) injection 1 mg  1 mg IntraMUSCular PRN [place] Ego, APRN - CNP        dextrose 5 % solution  100 mL/hr IntraVENous PRN [place] Ego, APRN - CNP        insulin lispro (HUMALOG) injection vial 0-12 Units  0-12 Units SubCUTAneous TID WC Roney Ego, APRN - CNP   2 Units at 09/21/21 0826    insulin lispro (HUMALOG) injection vial 0-6 Units  0-6 Units SubCUTAneous Nightly [place] Ego, APRN - CNP   1 Units at 09/22/21 2233    sodium chloride flush 0.9 % injection 5-40 mL  5-40 mL IntraVENous 2 times per day [place] Ego, APRN - CNP   10 mL at 09/22/21 2236    sodium chloride flush 0.9 % injection 5-40 mL  5-40 mL IntraVENous PRN Roney Ego, APRN - CNP        enoxaparin (LOVENOX) injection 30 mg  30 mg SubCUTAneous BID Roney Ego, APRN - CNP   30 mg at 09/20/21 0924    ondansetron (ZOFRAN-ODT) disintegrating tablet 4 mg  4 mg Oral Q8H PRN [place] Ego, APRN - CNP        Or    ondansetron [facility]) injection 4 mg  4 mg IntraVENous Q6H PRN Roney Ego, APRN - CNP        polyethylene glycol (GLYCOLAX) packet 17 g  17 g Oral Daily PRN [place] Ego, APRN - CNP        famotidine (PEPCID) tablet 20 mg  20 mg Oral BID [place] Ego, APRN - CNP 20 mg at 09/23/21 1012    guaiFENesin-dextromethorphan (ROBITUSSIN DM) 100-10 MG/5ML syrup 5 mL  5 mL Oral Q4H PRN Myrla Cram, APRN - CNP        Vitamin D (CHOLECALCIFEROL) tablet 2,000 Units  2,000 Units Oral Daily Myrla Cram, APRN - CNP   2,000 Units at 09/23/21 1012    0.9 % sodium chloride infusion  25 mL IntraVENous PRN Myrla Cram, APRN - CNP        acetaminophen (TYLENOL) tablet 650 mg  650 mg Oral Q6H PRN Myrla Cram, APRN - CNP   650 mg at 09/23/21 1010    Or    acetaminophen (TYLENOL) suppository 650 mg  650 mg Rectal Q6H PRN Myrla Cram, APRN - CNP        aspirin chewable tablet 81 mg  81 mg Oral Daily Myrla Cram, APRN - CNP   81 mg at 09/23/21 1012    atorvastatin (LIPITOR) tablet 40 mg  40 mg Oral Nightly Myrla Cram, APRN - CNP   40 mg at 09/22/21 2234    melatonin disintegrating tablet 5 mg  5 mg Oral Nightly Carmel Silvan, APRN - CNP   5 mg at 09/22/21 2234    cyclobenzaprine (FLEXERIL) tablet 10 mg  10 mg Oral TID PRN Carmel Silvan, APRN - CNP   10 mg at 09/21/21 1907     Review of Systems   Constitutional: Positive for fatigue. Respiratory: Negative. Cardiovascular: Negative. Gastrointestinal: Negative. Neurological: Negative. Objective:     Telemetry monitor: SR LBBB    Physical Exam:  Constitutional:  Comfortable and alert, NAD, appears stated age  Eyes: PERRL, sclera nonicteric  Neck:  Supple, no masses, no thyroidmegaly, no JVD  Skin:  Warm and dry; no rash or lesions  Heart: Regular, normal apex, S1 and S2 normal, no M/G/R  Lungs:  Normal respiratory effort; clear; no wheezing/rhonchi/rales  Abdomen: soft, non tender, + bowel sounds  Extremities:  No edema or cyanosis; no clubbing  Neuro: alert and oriented, moves legs and arms equally, normal mood and affect    Data Reviewed:    Echo 9/22/2021:  The left ventricular systolic function is moderately reduced with an   ejection fraction of 30-35%.    There is hypokinesis of the apex, apical lateral, apical septum, anterior,   anteroseptum and apical anterior walls. Grade I diastolic dysfunction with normal filling pressure. Changes noted from previous echo on 6- in left ventricular function. Mild mitral regurgitation. Echo 2018:  Normal left ventricle systolic function with an estimated ejection fraction   of 55%. No regional wall motion abnormalities are seen. Grade I diastolic dysfunction with normal filing pressure. The non-coronary cusp of the aortic valve is thickened/calcified with   decreased leaflet mobility. No aortic stenosis noted. Mild pulmonic regurgitation. Mild tricuspid regurgitation. Systolic pulmonary artery pressure (SPAP) is normal and estimated at 24 mmHg   (right atrial pressure 3 mmHg). Coronary angiogram 10/2014:  LM, LAD, LCX, RCA with no significant CAD (RCA with <10%)  Significant kinking and tortousity of vessel  LVEDP 5  LVEF 65%  PLAN  1. No significant CAD (only <10% in prox RCA)  2. CP likley from HTn and stress, possibly from coronary kinking.     Lab Reviewed:     Renal Profile:  Lab Results   Component Value Date    CREATININE 0.9 09/23/2021    BUN 14 09/23/2021     09/23/2021    K 4.4 09/23/2021    K 4.9 09/20/2021    CL 98 09/23/2021    CO2 21 09/23/2021     CBC:    Lab Results   Component Value Date    WBC 2.9 09/23/2021    RBC 4.09 09/23/2021    HGB 11.5 09/23/2021    HCT 36.3 09/23/2021    MCV 88.9 09/23/2021    RDW 23.7 09/23/2021    PLT 97 09/23/2021     BNP:  No results found for: PROBNP  Fasting Lipid Panel:    Lab Results   Component Value Date    CHOL 139 12/11/2015    HDL 57 12/11/2015    TRIG 113 12/11/2015     Cardiac Enzymes:  CK/MbTroponin  Lab Results   Component Value Date    TROPONINI <0.01 09/19/2021     PT/ INR   Lab Results   Component Value Date    INR 1.02 10/30/2014    PROTIME 11.0 10/30/2014     PTT No results found for: PTT   Lab Results   Component Value Date    MG 1.60 03/27/2015      Lab Results Component Value Date    TSH 1.49 04/03/2013     All labs and imaging reviewed today    Assessment:  LBBB: new  Cardiomyopathy, consider takotsubo vs ischemic: new diagnosis, EF 30-35% on echo 9/2021  CAD: mild on angiogram 2014  Symptomatic PVCs  HTN: stable  DM  Metabolic acidosis  THEO  Pneumonia  Fever  Thrombocytopenia    Plan:   1. Recommend coronary angiography for new cardiomyopathy/concern for LAD lesion however ongoing fevers may delay procedure. Keep NPO after midnight tonight and will re evaluate tomorrow morning. 2. Continue toprol for cardiomyopathy  3. Holding ACE/ARB due to THEO on presentation  4. Continue aspirin, statin, magnesium  5.  Pneumonia per IM    Dominguez Bautista, APRN-CNP  [address]  [phone]

## 2021-09-23 NOTE — PROGRESS NOTES
Pt assessment completed and charted. VSS. Pt a/o x4. Pt declining bed alarm. Pt temperature is 100.8, prn tylenol given per mar. Pt denies pain. Bed in lowest position and wheels locked. Call light within reach. Bedside table within reach. Non-skid footwear in place. Pt denies any other needs at this time. Pt calls out appropriately. Will continue to monitor.

## 2021-09-23 NOTE — CONSULTS
Consult placed    Who:Dr. Bebe Stacy  Date:9/23/2021,  Time:12:25 PM        Electronically signed by Harsha Bernal on 9/23/2021 at 12:25 PM

## 2021-09-23 NOTE — PROGRESS NOTES
Perfect serve message sent to [facility] DO, \"Lost IV access for pt. Multiple RN's are unable to get a new IV because her veins continue to blow. All previous IV's continue to infiltrate. Pt has Q8 meropenem and Q24 vancomycin for her pneumonia. Do you think we would be able to do a PICC since she needs to continue to get these antibiotics? thanks\", awaiting response. Provider responded at [phone] asking writer to contact nephrology and make sure they were okay with PICC    Call placed by Portillo Mishra at [phone] to on call physician for [facility] [address], message left with callback number. Awaiting return call.     No call received back from Nephrology

## 2021-09-23 NOTE — PROGRESS NOTES
Pt SpO2 bouncing between 88-91% on room air. Placed on 1L O2 by nasal cannula. Will continue to monitor.

## 2021-09-23 NOTE — CONSULTS
Infectious Diseases   Consult Note      Reason for Consult:  Febrile illness, pneumonia  Requesting Physician:   Dr. Hillary Cronin     Date of Admission: 9/19/2021  Subjective:   CHIEF COMPLAINT:  None given       HPI:    Vernon Alcocer is a 63yoF with history of HTN, HLD, DM                ED 9/19/21 - ~4d malaise, headache, myalgia, nausea, anorexia. Had also fallen a few times and had R hip pain. Some household members had Brayan and she was sure she did as well though had been vaccinated    CXR with L base pneumonia. She has not experienced chest pain, cough, SOB. XR R hip was negative   WBC was wnl, platelets were low  THEO noted   Admitted for management, started on steroid, empiric abx with     COVID NAAT x2 and PCR all negative     WBC has declined since admission, today is 2.9 with lymphopenia   Platelets are low since admission, today 97  THEO has resolved   AST/ALT were slightly elevated on admission     Continued high grade fever, today was as high as 103 this AM  She is on RA    She denies other localizing complaints    Lives with multiple family members, 5 dogs, 1 cat. No travel, no hobbies.   Does not work outside the home       Current abx:  levaquin 9/19-9/21/21  Meropenem started PM 9/22/21  vanc started 9/22/21       Past Surgical History:       Diagnosis Date    Arthritis     Asthma     Diabetes mellitus ([address]     type 2, takes PO meds    History of palpitations     Hyperlipidemia     Hypertension          Procedure Laterality Date    CATARACT REMOVAL WITH IMPLANT Right 10/18/2017    entered to epic from h&P    CATARACT REMOVAL WITH IMPLANT Left 11/08/2017    CHOLECYSTECTOMY      COLONOSCOPY      CORONARY ANGIOPLASTY  10/30/14    CYST REMOVAL      from right wrist     ENDOSCOPY, COLON, DIAGNOSTIC      GASTRIC BYPASS SURGERY  2005    HERNIA REPAIR      HYSTERECTOMY      age 30    OTHER SURGICAL HISTORY  CYSTOSCOPY, RIGHT URETERAL STONE MANIPULATION WITH RIGHT    SHOULDER ARTHROPLASTY Left 12/15/2016    LEFT PROXIMAL HUMERUS OPEN REDUCTION INTERNAL FIXATION     TONSILLECTOMY         Social History:    TOBACCO:   reports that she has never smoked. She has never used smokeless tobacco.  ETOH:   reports no history of alcohol use. There is no history of illicit drug use or other significant epidemiologic exposures.       Family History:       Problem Relation Age of Onset    Heart Attack Maternal Grandmother        Current Medications:    Current Facility-Administered Medications: lidocaine 1 % injection 5 mL, 5 mL, IntraDERmal, Once  sodium chloride flush 0.9 % injection 5-40 mL, 5-40 mL, IntraVENous, 2 times per day  sodium chloride flush 0.9 % injection 5-40 mL, 5-40 mL, IntraVENous, PRN  0.9 % sodium chloride infusion, 25 mL, IntraVENous, PRN  0.9 % sodium chloride infusion, , IntraVENous, Continuous  metoprolol succinate (TOPROL XL) extended release tablet 25 mg, 25 mg, Oral, Daily  vancomycin (VANCOCIN) 1,250 mg in dextrose 5 % 250 mL IVPB, 1,250 mg, IntraVENous, Q24H  meropenem (MERREM) 1,000 mg in sodium chloride 0.9 % 100 mL IVPB (mini-bag), 1,000 mg, IntraVENous, Q8H  perflutren lipid microspheres (DEFINITY) injection 1.65 mg, 1.5 mL, IntraVENous, ONCE PRN  magnesium oxide (MAG-OX) tablet 400 mg, 400 mg, Oral, BID  aspirin chewable tablet 324 mg, 324 mg, Oral, Once  glucose (GLUTOSE) 40 % oral gel 15 g, 15 g, Oral, PRN  dextrose 50 % IV solution, 12.5 g, IntraVENous, PRN  glucagon (rDNA) injection 1 mg, 1 mg, IntraMUSCular, PRN  dextrose 5 % solution, 100 mL/hr, IntraVENous, PRN  insulin lispro (HUMALOG) injection vial 0-12 Units, 0-12 Units, SubCUTAneous, TID WC  insulin lispro (HUMALOG) injection vial 0-6 Units, 0-6 Units, SubCUTAneous, Nightly  sodium chloride flush 0.9 % injection 5-40 mL, 5-40 mL, IntraVENous, 2 times per day  sodium chloride flush 0.9 % injection 5-40 mL, 5-40 mL, IntraVENous, PRN  enoxaparin (LOVENOX) injection 30 mg, 30 mg, SubCUTAneous, BID  ondansetron (ZOFRAN-ODT) disintegrating tablet 4 mg, 4 mg, Oral, Q8H PRN **OR** ondansetron (ZOFRAN) injection 4 mg, 4 mg, IntraVENous, Q6H PRN  polyethylene glycol (GLYCOLAX) packet 17 g, 17 g, Oral, Daily PRN  famotidine (PEPCID) tablet 20 mg, 20 mg, Oral, BID  guaiFENesin-dextromethorphan (ROBITUSSIN DM) 100-10 MG/5ML syrup 5 mL, 5 mL, Oral, Q4H PRN  Vitamin D (CHOLECALCIFEROL) tablet 2,000 Units, 2,000 Units, Oral, Daily  0.9 % sodium chloride infusion, 25 mL, IntraVENous, PRN  acetaminophen (TYLENOL) tablet 650 mg, 650 mg, Oral, Q6H PRN **OR** acetaminophen (TYLENOL) suppository 650 mg, 650 mg, Rectal, Q6H PRN  aspirin chewable tablet 81 mg, 81 mg, Oral, Daily  atorvastatin (LIPITOR) tablet 40 mg, 40 mg, Oral, Nightly  melatonin disintegrating tablet 5 mg, 5 mg, Oral, Nightly  cyclobenzaprine (FLEXERIL) tablet 10 mg, 10 mg, Oral, TID PRN      Allergies   Allergen Reactions    Morphine Other (See Comments)     Chest pain    Sulfamethoxazole      Other reaction(s): Nausea/vomit    Trimethoprim      Other reaction(s): Nausea/vomit    Alendronate Other (See Comments)     Chest pain     Bactrim [Sulfamethoxazole-Trimethoprim]     Buspirone      Other reaction(s): Other (See Comments)  Gi upset     Calcitonin (Divide)      Other reaction(s): Other (See Comments)  Headsaches     Demerol Hcl [Meperidine]     Dust Mite Extract     Esomeprazole Magnesium      Other reaction(s): Other (See Comments)  ELEVATED BP    Glipizide Other (See Comments)     Hypoglycemia    Metformin Diarrhea    Mometasone     Omeprazole      Other reaction(s):  Other (See Comments)  Sick to stomach     Other Other (See Comments)     Trazadone- vomiting     Oxycodone      Other reaction(s): Nausea;Vomitting    Oxycodone-Acetaminophen     Pcn [Penicillins]      Other reaction(s): Trouble Breathing  Nausea      Percocet [Oxycodone-Acetaminophen]      Nausea      Prednisone     Propoxyphene     Ranitidine      Other reaction(s): Nausea    Sulfa Antibiotics     Toradol [Ketorolac Tromethamine] Other (See Comments)     Muscle spasms in chest    Tramadol      Other reaction(s): Other (See Comments)  Dizziness     Zantac [Ranitidine Hcl]         REVIEW OF SYSTEMS:    CONSTITUTIONAL:   Per HPI   EYES:  negative for blurred vision, eye discharge, visual disturbance and icterus  HEENT:  negative for hearing loss, tinnitus, ear drainage, sinus pressure, nasal congestion, epistaxis and snoring  RESPIRATORY:  No cough, shortness of breath, hemoptysis  CARDIOVASCULAR:  negative for chest pain, palpitations, exertional chest pressure/discomfort, edema, syncope  GASTROINTESTINAL:  negative for vomiting, diarrhea, constipation, blood in stool and abdominal pain  GENITOURINARY:  negative for frequency, dysuria, urinary incontinence, decreased urine volume, and hematuria  HEMATOLOGIC/LYMPHATIC:  negative for easy bruising, bleeding and lymphadenopathy  ALLERGIC/IMMUNOLOGIC:  negative for recurrent infections, angioedema, anaphylaxis and drug reactions  ENDOCRINE:  negative for weight changes and diabetic symptoms including polyuria, polydipsia and polyphagia  MUSCULOSKELETAL:  negative for acute joint swelling, decreased range of motion and muscle weakness  NEUROLOGICAL:  negative for headaches, slurred speech, unilateral weakness  PSYCHIATRIC/BEHAVIORAL: negative for hallucinations, behavioral problems, confusion and agitation.        Objective:   PHYSICAL EXAM:      VITALS:  BP (!) 94/58   Pulse 65   Temp 97.9 °F (36.6 °C) (Oral)   Resp 18   Ht 5' 3\" (1.6 m)   Wt 158 lb 1.6 oz (71.7 kg)   SpO2 94%   BMI 28.01 kg/m²      24HR INTAKE/OUTPUT:      Intake/Output Summary (Last 24 hours) at 9/23/2021 1844  Last data filed at 9/23/2021 1511  Gross per 24 hour   Intake 180 ml   Output 1500 ml   Net -1320 ml     CONSTITUTIONAL:  Awake, alert, cooperative, no apparent distress, and appears stated age  She looks tired, ill   HEENT: NCAT, PERRL, EOMI. Sclera white, conjunctiva full. OP with moist mucosal membranes, no thrush, tongue protrudes midline  NECK:  Supple, symmetrical, trachea midline, no adenopathy  LUNGS:  no increased work of breathing, rales bases, L>>R  No dullness to percussion   CARDIOVASCULAR:  RRR without murmur   ABDOMEN:  normal bowel sounds, non-tender, non-distended  PSYCHIATRIC: Oriented to person place and time. No obvious depression or anxiety. Flat affect   MUSCULOSKELETAL: No obvious misalignment or effusion of the joints. No clubbing, cyanosis of the digits. SKIN:  normal skin color, texture, turgor and no redness, warmth, or swelling. No palpable nodules or stigmata of embolic phenomenon  NEUROLOGIC: nonfocal exam  ACCESS:   IV site ok       DATA:    Old records have been reviewed    CBC:  Recent Labs     09/22/21  1144 09/23/21  0603   WBC 3.3* 2.9*   RBC 4.10 4.09   HGB 11.5* 11.5*   HCT 36.7 36.3   * 97*   MCV 89.7 88.9   MCH 28.1 28.0   MCHC 31.4 31.5   RDW 23.9* 23.7*      BMP:  Recent Labs     09/21/21  1136 09/22/21  1144 09/23/21  0603   * 134* 130*   K 3.2* 3.7 4.4   CL 96* 100 98*   CO2 28 25 21   BUN 23* 18 14   CREATININE 1.1 0.9 0.9   CALCIUM 8.3 8.2* 8.1*   GLUCOSE 135* 118* 108*        Cultures:   9/19 COVID PCR and NAAT neg    UA neg   9/21 COVID NAAT neg    BC x2 NGTD      Radiology Review:  All pertinent images / reports were reviewed as a part of this visit.    CXR with L base infiltrate    Assessment:     Patient Active Problem List   Diagnosis    Chest pain    Hypertension    Hyperlipidemia    Gross hematuria    Obstructive uropathy    PVC (premature ventricular contraction)    Palpitations    Coronary artery disease involving native coronary artery of native heart without angina pectoris    Closed fracture of proximal end of left humerus    PNA (pneumonia)    Pneumonia    Abnormal ECG    Aortic valve sclerosis       Erwin Prim is a 63yoF who is evaluated for the following:    Acute febrile illness, onset ~7-10d ago   Myalgia, malaise, weakness, headache   Leukopenia, TCP, mild elevation of transaminases  LLL infiltrate though no chest pain, cough, SOB. No supplemental oxygen requirement     Multiple HH members with COVID  COVID tests x3 negative     Allergy pcn  Intolerant of sulfa       Recs:  Appears to be lobar CAP yet did not respond to levofloxacin  Check urine antigens legionella, pneumococcus  Influenza antigen   CT CAP   Update LFTs, CRP   BC are in process     Meropenem and vanc to be continued for now  Add azithro for coverage of atypical CAP organisms. Baseline QTc is <500     Further recs to follow       Slade Fallon M.D. Thank you for the opportunity to participate in the care of your patient.     Please do not hesitate to contact me:   [phone] office

## 2021-09-23 NOTE — ADT AUTH CERT
Chest Pain - Care Day 3 (9/21/2021) by Adraine Mcmillan RN       Review Status Review Entered   Completed 9/22/2021 14:50      Criteria Review      Care Day: 3 Care Date: 9/21/2021 Level of Care: Inpatient Floor    Guideline Day 2    Clinical Status    (X) * Hemodynamic stability    9/22/2021 2:50 PM EDT by Gabriella Champion      /72   Pulse 71    (X) * Acute coronary syndrome ruled out    (X) * Pain absent or managed    9/22/2021 2:50 PM EDT by Lacho Collazo Denies chest pain, shortness of breath, palpitations and dizziness. (X) * Dangerous arrhythmia absent    9/22/2021 2:50 PM EDT by Gabriella Champion      Rhythm has been sinus LBBB    ( ) * No identification of etiology of pain requiring inpatient care    ( ) * Discharge plans and education understood    Activity    (X) * Ambulatory or acceptable for next level of care    Routes    (X) * Oral hydration    (X) * Oral medications or regimen acceptable for next level of care    (X) * Oral diet or acceptable for next level of care    9/22/2021 2:50 PM EDT by Gabriella Champion      regular    Medications    (X) Possible aspirin or cardiac medications    9/22/2021 2:50 PM EDT by Gabriella Champion      PO ASA 81mg  PO Lipitor    ( ) Discontinue IV medications    9/22/2021 2:50 PM EDT by Lacho Collazo remains on IV antibiotics    * Milestone   Additional Notes   9/21 - CD 3      Subjective:    Feels better   No cough sputum shortness of breath chest pain fever or change in mental status   Appetite is better      /72   Pulse 71   Temp 98.3 °F (36.8 °C) (Oral)   Resp 16   Ht 5' 3\" (1.6 m)   Wt 158 lb 14.4 oz (72.1 kg)   SpO2 91%   BMI 28.15 kg/m²        General appearance: No apparent distress, appears stated age and cooperative. HEENT: . Conjunctivae/corneas clear. Neck: Supple, with full range of motion. No jugular venous distention. Trachea midline. Respiratory:  Normal respiratory effort.  Clear to auscultation, bilaterally without     Plan   F/u BMP lab   Check serum lactic acid   Check serum ketones    Continue IVF sodium bicarb 150 meq @ 100 ml/hr       2.  Hyperkalemia in setting of metabolic acidosis    F/u BMP   Give Lokelma 5 g TID   Serum K   5.1 ----> 5.7 ------> 4.9    Avoid ACEI or ARB   Low K diet        3 THEO   Serum cr improved from 1.4 to 1.2   Avoid contrast   Avoid ACEI OR ARB               Labs   Results for Eric Hua (MRN [id number]) as of 9/22/2021 14:52      9/21/2021 11:36   Sodium: 134 (L)   Potassium: 3.2 (L)   Chloride: 96 (L)   CO2: 28   BUN: 23 (H)   Creatinine: 1.1   Anion Gap: 10   GFR Non-: 49 (A)   GFR : 59 (A)   Glucose: 135 (H)            Scheduled Medications   · magnesium oxide 400 mg Oral BID   · levofloxacin 250 mg IntraVENous Q24H   · sodium zirconium cyclosilicate 5 g Oral TID   · aspirin 324 mg Oral Once   · insulin lispro 0-12 Units SubCUTAneous TID WC   · insulin lispro 0-6 Units SubCUTAneous Nightly   · enoxaparin 30 mg SubCUTAneous BID   · famotidine 20 mg Oral BID   · Vitamin D 2,000 Units Oral Daily   · aspirin 81 mg Oral Daily   · atorvastatin 40 mg Oral Nightly   · melatonin 5 mg Oral Nightly          sodium bicarbonate 150 mEq in dextrose 5 % 1,000 mL infusion    Rate: 100 mL/hr Freq: CONTINUOUS Route: IV         acetaminophen (TYLENOL) tablet 650 mg    Dose: 650 mg   Freq: EVERY 6 HOURS PRN Route: PO   PRN Reasons: Pain Mild (1-3),Fever   X1      cyclobenzaprine (FLEXERIL) tablet 10 mg    Dose: 10 mg   Freq: 3 TIMES DAILY PRN Route: PO   PRN Reason: Muscle spasms   X1            Chest Pain - Care Day 2 (9/20/2021) by Trista Crawford RN       Review Status Review Entered   Completed 9/21/2021 10:46      Criteria Review      Care Day: 2 Care Date: 9/20/2021 Level of Care: Inpatient Floor    Guideline Day 2    Clinical Status    ( ) * Hemodynamic stability    9/21/2021 10:46 AM EDT by Hanny Spencer      platelets 539  K 5.7  anion gap 17  glucose 211, 362, 323    98.0  20  73  125/68  94%ra    renal consult    ( ) * Acute coronary syndrome ruled out    ( ) * Pain absent or managed    ( ) * Dangerous arrhythmia absent    ( ) * No identification of etiology of pain requiring inpatient care    ( ) * Discharge plans and education understood    Activity    ( ) * Ambulatory or acceptable for next level of care    Routes    ( ) * Oral hydration    2021 10:46 AM EDT by Cassia Neal      sodium bicarbonate 150 mEq in dextrose 5 % 1,000 mL infusion   Rate: 100 mL/hr    ( ) * Oral medications or regimen acceptable for next level of care    2021 10:46 AM EDT by Cassia Neal      lovenox 30mg sc bid  levaquin 250mg iv q24hrs  LOKELMA oral suspension 5 g tid  kayexelate 30g x1    flexeril 10mg x1    ( ) * Oral diet or acceptable for next level of care    Medications    ( ) Possible aspirin or cardiac medications    2021 10:46 AM EDT by [facility] asa held    * Milestone   Additional Notes      Per Cardio:   Assessment and Plan       1. Abnormal ECG: new LBBB   2. Aortic sclerosis   3.  CAD: cath showed <10% in , very minimal CAD. 4. Hx of PVCs: Old Holter  Significant multifocal ectopy. >15% PVC burden, <1% atrial ectopy.                With medication--> holter 2015 with <0.1% burden   5. Hypertension:remains controlled       PLAN   1.  No obvious murmur on exam but having to use isolation stethoscope limits auscultation.    2.  Update Echo   3.  Continue telemetry.  Informed patient of PVCs increased she will need another monitor.  Continue magnesium oxide for now         [facility] Physician Advisor Recommendation by Inocencia Olivo RN       Review Status Review Entered   In Primary 2021 16:36      Criteria Review   Name: Kunal Husain   :    CSN: [id number]   INSURANCE: [employer] Medicare  -----------------------------------------------------------------------------  [facility] Physician Advisor Recommendation    Based on the clinical acuity, complexity, hospital course, data review and physician/consultant impressions and findings noted below it is our recommendation to keep patient in INPATIENT status.  ---------------------------------------------------------------------------------  Summary of impressions and findings:     Clinical summary - 66 y.o. with SOB, possible pneumonia, asthma. New left BBB, Cardio consult. K 5.7, Na 130. THEO Renal consult. Suspected Covid as entire family +, is negative. IV Levaquin, Solumedrol iv bid, lovenox sc bid, ivf, Bicarb gtt, Lokelma. Vitals -   Labs and imaging - CXR small basilar PNA  MCG criteria -   Comments - Keep patient as Inpatient status. PNA with THEO. New LBB with telemetry. Chart reviewed and VUR notified.

## 2021-09-23 NOTE — PROGRESS NOTES
Robles RN called about PICC line patient doesn't need at this time they were able to get a line on the patient. Order canceled at this time Please reorder if PICC is needed.

## 2021-09-23 NOTE — PROGRESS NOTES
Perfect serve message sent to Je Doyle MD, [facility] NILS cross cover before day shift for PICC line order. Pt lost IV access, unable to get another line. Are we able to get an order for a PICC so she can get her antibiotics? I will also message nephrology to make sure this is okay with them. Thanks\", awaiting response.

## 2021-09-23 NOTE — PROGRESS NOTES
Pt axo, lethargic. States she does not feel well. Temperature 102.9 orally. PRN tylenol administered. Assessment completed as charted. Pt denies additional needs. Will continue to monitor. Call light in reach.

## 2021-09-23 NOTE — PROGRESS NOTES
MD paged re: temperature and BP. Nephrology at bedside, see new orders for IV fluids. Will continue to monitor. Call light in reach.

## 2021-09-23 NOTE — PROGRESS NOTES
Physician Progress Note      Concetta Torres  [facility] #:                  [id number]  :                       1950  ADMIT DATE:       2021 9:07 AM  DISCH DATE:  RESPONDING  PROVIDER #:        Ralph Hernández MD          QUERY TEXT:    Patient admitted with pneumonia. Patient noted to have indicators for sepsis. If possible, please document in the progress notes and discharge summary if   you are evaluating and /or treating any of the following: The medical record reflects the following:  Risk Factors: Asthma, diabetes, thrombocytopenia, Takotsubo  Clinical Indicators: THEO, Temp up to 183, metabolic acidosis, Pulse 91  Treatment: IV vancomycin, IV cefepime, IV fluids, blood cultures, serial labs,   supportive care    Thank you,  [email or web address]  Options provided:  -- Evolving sepsis, present on arrival  -- Sepsis, not present on arrival  -- Pneumonia without Sepsis  -- Other - I will add my own diagnosis  -- Disagree - Not applicable / Not valid  -- Disagree - Clinically unable to determine / Unknown  -- Refer to Clinical Documentation Reviewer    PROVIDER RESPONSE TEXT:    This patient has evolving sepsis which was present on arrival.    Query created by: Teri Fuentes on 2021 4:22 PM      Electronically signed by:  Ralph Hernández MD 2021 8:20 AM

## 2021-09-23 NOTE — CARE COORDINATION
Chart reviewed day 4. Care managed per nephrology, cards and IM. Spoke with Dr Rafi Kulkarni. Patient needs heart cath cannot do with continued fevers. Discussed possible ID input or echo will continue to follow for needs.  Patient IPTA not therapy needs for d/c. Tita Whitney, GINA

## 2021-09-23 NOTE — PROGRESS NOTES
Perfect serve message sent to Yasmeen Todd MD, Macy Louis you okay with pt getting PICC line ordered? Nephrology following for hyperkalemia and metabolic acidosis that has since resolved. thanks\", awaiting response.

## 2021-09-23 NOTE — CONSULTS
Pharmacy Note  Vancomycin Consult    Vannesa Aly is a 66 y.o. female started on Vancomycin for CAP; consult received from Dr. Yaima Cowart  to manage therapy. Also receiving the following antibiotics: Cefepime. Allergies:  Morphine, Sulfamethoxazole, Trimethoprim, Alendronate, Bactrim [sulfamethoxazole-trimethoprim], Buspirone, Calcitonin (salmon), Demerol hcl [meperidine], Dust mite extract, Esomeprazole magnesium, Glipizide, Metformin, Mometasone, Omeprazole, Other, Oxycodone, Oxycodone-acetaminophen, Pcn [penicillins], Percocet [oxycodone-acetaminophen], Prednisone, Propoxyphene, Ranitidine, Sulfa antibiotics, Toradol [ketorolac tromethamine], Tramadol, and Zantac [ranitidine hcl]       Recent Labs     09/21/21  1136 09/22/21  1144   CREATININE 1.1 0.9       Recent Labs     09/20/21  0540 09/22/21  1144   WBC 4.3 3.3*       Estimated Creatinine Clearance: 53 mL/min (based on SCr of 0.9 mg/dL). Intake/Output Summary (Last 24 hours) at 9/22/2021 1447  Last data filed at 9/22/2021 0544  Gross per 24 hour   Intake 50 ml   Output 700 ml   Net -650 ml       Wt Readings from Last 1 Encounters:   09/22/21 159 lb 8 oz (72.3 kg)         Body mass index is 28.25 kg/m². Culture Date      Source                       Results  pending    Loading dose (critically ill or in ICU, require dialysis or renal replacement therapy): Vancomycin 25 mg/kg IVPB x 1 (maximum 3000 mg). Maintenance dose: 15 mg/kg (maximum: 2000 mg/dose and 4500 mg/day) starting at the next dosing interval determined by renal function  Pulse dose: fluctuating renal function, THEO, ESRD   Goal Vancomycin trough: 10-15 mcg/mL or 15-20 mcg/mL   Goal Vancomycin AUC: 400-600     Assessment/Plan:  Will initiate Vancomycin with a one time loading dose of 2000 mg x1, followed by 1250 mg IV every 24  hours. Calculated .  Vancomycin trough ordered for 9/24  1000 Timing of trough level will be determined based on culture results, renal function, and clinical response.      Thank you for the consult     Julian Almaguer PharmD 09/22/21 2:52 PM

## 2021-09-24 ENCOUNTER — APPOINTMENT (OUTPATIENT)
Dept: MRI IMAGING | Age: 71
DRG: 871 | End: 2021-09-24
Payer: MEDICARE

## 2021-09-24 LAB
AMPHETAMINE SCREEN, URINE: NORMAL
ANION GAP SERPL CALCULATED.3IONS-SCNC: 10 MMOL/L (ref 3–16)
BARBITURATE SCREEN URINE: NORMAL
BASOPHILS ABSOLUTE: 0 K/UL (ref 0–0.2)
BASOPHILS RELATIVE PERCENT: 0.1 %
BENZODIAZEPINE SCREEN, URINE: NORMAL
BUN BLDV-MCNC: 19 MG/DL (ref 7–20)
C-REACTIVE PROTEIN: 21.5 MG/L (ref 0–5.1)
CALCIUM SERPL-MCNC: 8.1 MG/DL (ref 8.3–10.6)
CANNABINOID SCREEN URINE: NORMAL
CHLORIDE BLD-SCNC: 104 MMOL/L (ref 99–110)
CO2: 20 MMOL/L (ref 21–32)
COCAINE METABOLITE SCREEN URINE: NORMAL
CREAT SERPL-MCNC: 0.9 MG/DL (ref 0.6–1.2)
EOSINOPHILS ABSOLUTE: 0 K/UL (ref 0–0.6)
EOSINOPHILS RELATIVE PERCENT: 0.2 %
GFR AFRICAN AMERICAN: >60
GFR NON-AFRICAN AMERICAN: >60
GLUCOSE BLD-MCNC: 101 MG/DL (ref 70–99)
GLUCOSE BLD-MCNC: 108 MG/DL (ref 70–99)
GLUCOSE BLD-MCNC: 112 MG/DL (ref 70–99)
GLUCOSE BLD-MCNC: 116 MG/DL (ref 70–99)
GLUCOSE BLD-MCNC: 140 MG/DL (ref 70–99)
HCT VFR BLD CALC: 37.6 % (ref 36–48)
HEMOGLOBIN: 12 G/DL (ref 12–16)
LYMPHOCYTES ABSOLUTE: 0.8 K/UL (ref 1–5.1)
LYMPHOCYTES RELATIVE PERCENT: 14.8 %
Lab: NORMAL
MCH RBC QN AUTO: 28 PG (ref 26–34)
MCHC RBC AUTO-ENTMCNC: 31.9 G/DL (ref 31–36)
MCV RBC AUTO: 87.9 FL (ref 80–100)
METHADONE SCREEN, URINE: NORMAL
MONOCYTES ABSOLUTE: 0.2 K/UL (ref 0–1.3)
MONOCYTES RELATIVE PERCENT: 2.9 %
NEUTROPHILS ABSOLUTE: 4.5 K/UL (ref 1.7–7.7)
NEUTROPHILS RELATIVE PERCENT: 82 %
OPIATE SCREEN URINE: NORMAL
OXYCODONE URINE: NORMAL
PDW BLD-RTO: 24 % (ref 12.4–15.4)
PERFORMED ON: ABNORMAL
PH UA: 6
PHENCYCLIDINE SCREEN URINE: NORMAL
PLATELET # BLD: 113 K/UL (ref 135–450)
PMV BLD AUTO: 8.6 FL (ref 5–10.5)
POTASSIUM SERPL-SCNC: 4.3 MMOL/L (ref 3.5–5.1)
PROPOXYPHENE SCREEN: NORMAL
RAPID INFLUENZA  B AGN: NEGATIVE
RAPID INFLUENZA A AGN: NEGATIVE
RBC # BLD: 4.27 M/UL (ref 4–5.2)
SODIUM BLD-SCNC: 134 MMOL/L (ref 136–145)
VANCOMYCIN RANDOM: 12.9 UG/ML
WBC # BLD: 5.5 K/UL (ref 4–11)

## 2021-09-24 PROCEDURE — 2500000003 HC RX 250 WO HCPCS: Performed by: HOSPITALIST

## 2021-09-24 PROCEDURE — 2580000003 HC RX 258: Performed by: INTERNAL MEDICINE

## 2021-09-24 PROCEDURE — 99232 SBSQ HOSP IP/OBS MODERATE 35: CPT | Performed by: HOSPITALIST

## 2021-09-24 PROCEDURE — 99232 SBSQ HOSP IP/OBS MODERATE 35: CPT | Performed by: NURSE PRACTITIONER

## 2021-09-24 PROCEDURE — 6370000000 HC RX 637 (ALT 250 FOR IP): Performed by: INTERNAL MEDICINE

## 2021-09-24 PROCEDURE — 80048 BASIC METABOLIC PNL TOTAL CA: CPT

## 2021-09-24 PROCEDURE — 94761 N-INVAS EAR/PLS OXIMETRY MLT: CPT

## 2021-09-24 PROCEDURE — 97164 PT RE-EVAL EST PLAN CARE: CPT

## 2021-09-24 PROCEDURE — 97168 OT RE-EVAL EST PLAN CARE: CPT

## 2021-09-24 PROCEDURE — 97530 THERAPEUTIC ACTIVITIES: CPT

## 2021-09-24 PROCEDURE — 6370000000 HC RX 637 (ALT 250 FOR IP): Performed by: NURSE PRACTITIONER

## 2021-09-24 PROCEDURE — 1200000000 HC SEMI PRIVATE

## 2021-09-24 PROCEDURE — 87804 INFLUENZA ASSAY W/OPTIC: CPT

## 2021-09-24 PROCEDURE — 80307 DRUG TEST PRSMV CHEM ANLYZR: CPT

## 2021-09-24 PROCEDURE — 6360000002 HC RX W HCPCS: Performed by: INTERNAL MEDICINE

## 2021-09-24 PROCEDURE — 99232 SBSQ HOSP IP/OBS MODERATE 35: CPT | Performed by: INTERNAL MEDICINE

## 2021-09-24 PROCEDURE — 2700000000 HC OXYGEN THERAPY PER DAY

## 2021-09-24 PROCEDURE — 85025 COMPLETE CBC W/AUTO DIFF WBC: CPT

## 2021-09-24 PROCEDURE — 80202 ASSAY OF VANCOMYCIN: CPT

## 2021-09-24 PROCEDURE — 86140 C-REACTIVE PROTEIN: CPT

## 2021-09-24 PROCEDURE — 36415 COLL VENOUS BLD VENIPUNCTURE: CPT

## 2021-09-24 PROCEDURE — 2580000003 HC RX 258: Performed by: HOSPITALIST

## 2021-09-24 PROCEDURE — 87449 NOS EACH ORGANISM AG IA: CPT

## 2021-09-24 RX ORDER — LORAZEPAM 2 MG/ML
0.5 INJECTION INTRAMUSCULAR ONCE
Status: COMPLETED | OUTPATIENT
Start: 2021-09-24 | End: 2021-09-24

## 2021-09-24 RX ADMIN — ACETAMINOPHEN 650 MG: 325 TABLET ORAL at 12:57

## 2021-09-24 RX ADMIN — ATORVASTATIN CALCIUM 40 MG: 10 TABLET, FILM COATED ORAL at 21:41

## 2021-09-24 RX ADMIN — MEROPENEM 1000 MG: 1 INJECTION, POWDER, FOR SOLUTION INTRAVENOUS at 03:39

## 2021-09-24 RX ADMIN — SODIUM BICARBONATE: 84 INJECTION, SOLUTION INTRAVENOUS at 12:56

## 2021-09-24 RX ADMIN — ASPIRIN 81 MG: 81 TABLET, CHEWABLE ORAL at 10:01

## 2021-09-24 RX ADMIN — Medication 10 ML: at 10:04

## 2021-09-24 RX ADMIN — MAGNESIUM GLUCONATE 500 MG ORAL TABLET 400 MG: 500 TABLET ORAL at 10:02

## 2021-09-24 RX ADMIN — LORAZEPAM 0.5 MG: 2 INJECTION INTRAMUSCULAR; INTRAVENOUS at 17:18

## 2021-09-24 RX ADMIN — FAMOTIDINE 20 MG: 20 TABLET ORAL at 21:41

## 2021-09-24 RX ADMIN — FAMOTIDINE 20 MG: 20 TABLET ORAL at 10:02

## 2021-09-24 RX ADMIN — MEROPENEM 1000 MG: 1 INJECTION, POWDER, FOR SOLUTION INTRAVENOUS at 10:06

## 2021-09-24 RX ADMIN — AZITHROMYCIN DIHYDRATE 500 MG: 250 TABLET, FILM COATED ORAL at 10:02

## 2021-09-24 RX ADMIN — Medication 2000 UNITS: at 10:02

## 2021-09-24 RX ADMIN — INSULIN LISPRO 1 UNITS: 100 INJECTION, SOLUTION INTRAVENOUS; SUBCUTANEOUS at 21:43

## 2021-09-24 RX ADMIN — MAGNESIUM GLUCONATE 500 MG ORAL TABLET 400 MG: 500 TABLET ORAL at 21:41

## 2021-09-24 ASSESSMENT — PAIN SCALES - GENERAL
PAINLEVEL_OUTOF10: 0

## 2021-09-24 ASSESSMENT — ENCOUNTER SYMPTOMS
GASTROINTESTINAL NEGATIVE: 1
RESPIRATORY NEGATIVE: 1

## 2021-09-24 NOTE — PROGRESS NOTES
Occupational Therapy   Occupational Therapy Initial Re-Assessment and treatment    Date: 2021   Patient Name: Varghese Powers  MRN: [id number]     : 1950    Date of Service: 2021    Discharge Recommendations:  24 hour supervision or assist, Home with Home health OT       Assessment   Performance deficits / Impairments: Decreased functional mobility ; Decreased ADL status; Decreased strength;Decreased safe awareness;Decreased cognition;Decreased endurance;Decreased balance  Assessment: Pt seen for re-evaluation d/t reported decline in function. Pt with flat affect, poor eye contact, min verbalizations. Pt needs much encouragement for participation this date. Pt with mild drop in BP with standing. Pt reports \"feeling terrible\" this date, temp of 99.9, RN reports temp was higher earlier. Recommend 2 person assist for mobiltiy on 1st attempt, as pt refused mobility this date. Prognosis: Fair  OT Education: OT Role;Plan of Care;ADL Adaptive Strategies;Precautions  Patient Education: disease specific: importance of OOB, moving in general  REQUIRES OT FOLLOW UP: Yes  Safety Devices  Safety Devices in place: Yes  Type of devices: Call light within reach; Chair alarm in place; Left in chair;Nurse notified           Patient Diagnosis(es): The primary encounter diagnosis was THEO (acute kidney injury) ([facility] [address] Diagnoses of Shortness of breath and Chest pain, unspecified type were also pertinent to this visit. has a past medical history of Arthritis, Asthma, Diabetes mellitus ([facility] [address] History of palpitations, Hyperlipidemia, and Hypertension. has a past surgical history that includes Cholecystectomy; Gastric bypass surgery (); Hysterectomy; hernia repair; cyst removal; Tonsillectomy; Colonoscopy; Endoscopy, colon, diagnostic; Coronary angioplasty (10/30/14); other surgical history (CYSTOSCOPY, RIGHT URETERAL STONE MANIPULATION WITH RIGHT); Total shoulder arthroplasty (Left, 12/15/2016);  Cataract removal with implant (Right, 10/18/2017); and Cataract removal with implant (Left, 11/08/2017). Restrictions  Restrictions/Precautions  Restrictions/Precautions: Contact Precautions, Up as Tolerated  Position Activity Restriction  Other position/activity restrictions: IV    Subjective   General  Chart Reviewed: Yes  Patient assessed for rehabilitation services?: Yes  Family / Caregiver Present: No  Referring Practitioner: WILLIS Morin MD  Diagnosis: colitis, N/V, UTI  Subjective  Subjective: Pt supine, agreeable with encouragement  General Comment  Comments: RN approved therapy  Patient Currently in Pain: Denies  Vital Signs  Temp: 98.5 °F (36.9 °C)  Temp Source: Oral  Pulse: 72  Heart Rate Source: Monitor  Resp: 18  BP: 111/64  BP Location: Right upper arm  Patient Currently in Pain: Denies  Oxygen Therapy  SpO2: 91 %  O2 Device: Nasal cannula  O2 Flow Rate (L/min): 2 L/min  Social/Functional History  Social/Functional History  Lives With: Family  Type of Home: House  Home Layout: Two level, Performs ADL's on one level, Able to Live on Main level with bedroom/bathroom  Home Access: Stairs to enter without rails  Entrance Stairs - Number of Steps: 2  Bathroom Shower/Tub: Walk-in shower  Bathroom Toilet: Standard  Bathroom Equipment: Shower chair  Home Equipment:  (no DME)  ADL Assistance: Independent  Homemaking Assistance: Independent  Ambulation Assistance: Independent  Transfer Assistance: Independent  Active : Yes  Additional Comments: Began to have falls 9/17/21 (1x in bathroom).  One fall 1 year ago       Objective   Vision: Impaired  Vision Exceptions: Wears glasses for reading  Hearing: Within functional limits    Orientation  Overall Orientation Status: Within Functional Limits     Balance  Sitting Balance: Supervision  Standing Balance: Contact guard assistance  Standing Balance  Time: ~ 30 sec  Activity: bedside standing to take BP  ADL  Grooming: Stand by assistance  Additional Comments: pt refusing ADL tasks this date  Tone RUE  RUE Tone: Normotonic  Tone LUE  LUE Tone: Normotonic  Coordination  Movements Are Fluid And Coordinated: Yes        Transfers  Sit to stand: Stand by assistance;Contact guard assistance  Stand to sit: Stand by assistance;Contact guard assistance     Cognition  Overall Cognitive Status: WFL        Sensation  Overall Sensation Status: WFL        LUE AROM (degrees)  LUE AROM : WFL  Left Hand AROM (degrees)  Left Hand AROM: WFL  RUE AROM (degrees)  RUE AROM : WFL  Right Hand AROM (degrees)  Right Hand AROM: WFL  LUE Strength  Gross LUE Strength: WFL  RUE Strength  Gross RUE Strength: WFL        Plan   Plan  Times per week: 3-5x/wk    AM-PAC Score        AM-[facility] Inpatient Daily Activity Raw Score: 15 (09/24/21 1459)  AM-PAC Inpatient ADL T-Scale Score : 34.69 (09/24/21 1459)  ADL Inpatient CMS 0-100% Score: 56.46 (09/24/21 1459)  ADL Inpatient CMS G-Code Modifier : CK (09/24/21 1459)    Goals  Short term goals  Time Frame for Short term goals: 1 week by 10/1  Short term goal 1: Pt will complete functional transfers with SBA  Short term goal 2: Pt will complete LB ADls with min A or less  Short term goal 3: Pt will complete toileting with CGA or less  Short term goal 4: Pt will tolerate standing at sink for grooming with SPV  Short term goal 5: Pt will complete B UE ex x 20 reps w/o fatigue  Patient Goals   Patient goals : Pt does not state goals       Therapy Time   Individual Concurrent Group Co-treatment   Time In 1332         Time Out 1350         Minutes 18         Timed Code Treatment Minutes: 8 Minutes (10 min re-eval)      If pt discharges prior to next session, this note will serve as discharge summary. See case management note for discharge disposition.      Jessica Espinoza OT

## 2021-09-24 NOTE — PROGRESS NOTES
Pt drowsy, oriented to person, place. Assessment completed as charted. Pt with poor PO intakes. States she \"feels terrible\" but does not elaborate. Denies needs at this time. Afebrile at this time. Will continue to monitor.

## 2021-09-24 NOTE — CARE COORDINATION
Chart reviewed day 5. Care managed per ID nephrology cardiology and IM. Spoke with nursing. Reported patient increasing lethargy. Now on BICARB gtt. Much weaker than before. PT/OT pending. Cardiology will follow up 9/27 if remains inpatient.  Vivienne Mueller RN

## 2021-09-24 NOTE — PROGRESS NOTES
APRN - CNP        famotidine (PEPCID) tablet 20 mg  20 mg Oral BID Pasty Eric, APRN - CNP   20 mg at 09/24/21 1002    guaiFENesin-dextromethorphan (ROBITUSSIN DM) 100-10 MG/5ML syrup 5 mL  5 mL Oral Q4H PRN Pasty Eric, APRN - CNP        Vitamin D (CHOLECALCIFEROL) tablet 2,000 Units  2,000 Units Oral Daily Pasty Eric, APRN - CNP   2,000 Units at 09/24/21 1002    0.9 % sodium chloride infusion  25 mL IntraVENous PRN Pasty Eric, APRN - CNP        acetaminophen (TYLENOL) tablet 650 mg  650 mg Oral Q6H PRN Pasty Eric, APRN - CNP   650 mg at 09/23/21 2053    Or    acetaminophen (TYLENOL) suppository 650 mg  650 mg Rectal Q6H PRN Pasty Eric, APRN - CNP        aspirin chewable tablet 81 mg  81 mg Oral Daily Pasty Eric, APRN - CNP   81 mg at 09/24/21 1001    atorvastatin (LIPITOR) tablet 40 mg  40 mg Oral Nightly Pasty Eric, APRN - CNP   40 mg at 09/23/21 2053    melatonin disintegrating tablet 5 mg  5 mg Oral Nightly Aminta Journey, APRN - CNP   5 mg at 09/23/21 2053    cyclobenzaprine (FLEXERIL) tablet 10 mg  10 mg Oral TID PRN Aminta Journey, APRN - CNP   10 mg at 09/21/21 1907     Review of Systems   Constitutional: Positive for fatigue. Respiratory: Negative. Cardiovascular: Negative. Gastrointestinal: Negative. Neurological: Negative.       Objective:     Telemetry monitor: SR LBBB    Physical Exam:  Constitutional:  Alert, NAD, appears stated age  Eyes: PERRL, sclera nonicteric  Neck:  Supple, no masses, no thyroidmegaly, no JVD  Skin:  Warm and dry; no rash or lesions  Heart: Regular, normal apex, S1 and S2 normal, no M/G/R  Lungs:  Normal respiratory effort; clear; no wheezing/rhonchi/rales  Abdomen: soft, non tender, + bowel sounds  Extremities:  No edema or cyanosis; no clubbing  Neuro: alert and oriented, moves legs and arms equally, fatigued mood and affect    Data Reviewed:    Echo 9/22/2021:  The left ventricular systolic function is moderately reduced with an   ejection fraction of 30-35%. There is hypokinesis of the apex, apical lateral, apical septum, anterior,   anteroseptum and apical anterior walls. Grade I diastolic dysfunction with normal filling pressure. Changes noted from previous echo on 6- in left ventricular function. Mild mitral regurgitation. Echo 2018:  Normal left ventricle systolic function with an estimated ejection fraction   of 55%. No regional wall motion abnormalities are seen. Grade I diastolic dysfunction with normal filing pressure. The non-coronary cusp of the aortic valve is thickened/calcified with   decreased leaflet mobility. No aortic stenosis noted. Mild pulmonic regurgitation. Mild tricuspid regurgitation. Systolic pulmonary artery pressure (SPAP) is normal and estimated at 24 mmHg   (right atrial pressure 3 mmHg). Coronary angiogram 10/2014:  LM, LAD, LCX, RCA with no significant CAD (RCA with <10%)  Significant kinking and tortousity of vessel  LVEDP 5  LVEF 65%  PLAN  1. No significant CAD (only <10% in prox RCA)  2. CP likley from HTn and stress, possibly from coronary kinking.     Lab Reviewed:     Renal Profile:  Lab Results   Component Value Date    CREATININE 0.9 09/24/2021    BUN 19 09/24/2021     09/24/2021    K 4.3 09/24/2021    K 4.9 09/20/2021     09/24/2021    CO2 20 09/24/2021     CBC:    Lab Results   Component Value Date    WBC 5.5 09/24/2021    RBC 4.27 09/24/2021    HGB 12.0 09/24/2021    HCT 37.6 09/24/2021    MCV 87.9 09/24/2021    RDW 24.0 09/24/2021     09/24/2021     BNP:  No results found for: PROBNP  Fasting Lipid Panel:    Lab Results   Component Value Date    CHOL 139 12/11/2015    HDL 57 12/11/2015    TRIG 113 12/11/2015     Cardiac Enzymes:  CK/MbTroponin  Lab Results   Component Value Date    TROPONINI <0.01 09/19/2021     PT/ INR   Lab Results   Component Value Date    INR 1.02 10/30/2014    PROTIME 11.0 10/30/2014     PTT No results found for: PTT   Lab Results   Component Value Date    MG 1.60 03/27/2015      Lab Results   Component Value Date    TSH 1.49 04/03/2013     All labs and imaging reviewed today    Assessment:  LBBB: new  Cardiomyopathy, consider takotsubo vs ischemic: new diagnosis, EF 30-35% on echo 9/2021  CAD: mild on angiogram 2014  Symptomatic PVCs  HTN: stable  DM  Metabolic acidosis  THEO  Pneumonia  Fever  Thrombocytopenia    Plan:   1. Delay coronary angiography due to pneumonia/ID evaluation. If she remains inpatient until next week, can consider angiography at that time if infection improving. Otherwise will arrange for outpatient follow up for timing of LHC. 2. Continue toprol for cardiomyopathy  3. Holding ACE/ARB due to THEO on presentation  4. Continue aspirin, statin, magnesium  5. Pneumonia per IM/ID  6. Cardiology will follow up on 9/27/2021 if she remains inpatient. Please call if needed in interim.      Lise Kerr, JENN-CNP  [address]  [phone]

## 2021-09-24 NOTE — PROGRESS NOTES
Results   Component Value Date    WBC 5.5 09/24/2021    HGB 12.0 09/24/2021    HCT 37.6 09/24/2021    MCV 87.9 09/24/2021     (L) 09/24/2021     Lab Results   Component Value Date    CREATININE 0.9 09/24/2021    BUN 19 09/24/2021     (L) 09/24/2021    K 4.3 09/24/2021     09/24/2021    CO2 20 (L) 09/24/2021       Hepatic Function Panel:   Lab Results   Component Value Date    ALKPHOS 49 09/23/2021    ALT 34 09/23/2021    AST 68 09/23/2021    PROT 5.9 09/23/2021    BILITOT 0.6 09/23/2021    BILIDIR <0.2 09/23/2021    IBILI see below 09/23/2021    LABALBU 2.8 09/23/2021         MICRO:  9/19     COVID PCR and NAAT neg               UA neg   9/21     COVID NAAT neg               BC x2 NGTD      IMAGING:    CT CAP 9/23/21  FINDINGS:       Chest:       Mediastinum: The heart size is normal.  There is no pericardial effusion. Aortic caliber is normal.  There may be a small hiatal hernia. Hollace Gondola is no   adenopathy.       Lungs/pleura: There are tiny bilateral pleural effusions. Hollace Gondola is no   pneumothorax.  The central airways are patent.  There are bilateral   ground-glass airspace opacities, most pronounced in the lingula and adjacent   left lower lobe spanning the major fissure.       Soft Tissues/Bones: No acute findings.           Abdomen/Pelvis:       Organs: The liver, spleen, pancreas, adrenal glands and kidneys are grossly   negative given the limitations of the noncontrast.       GI/Bowel: Gastric bypass is again noted.  Small bowel caliber is normal.  The   appendix is normal.  The colon is unremarkable.       Pelvis: The bladder is grossly negative.  The patient is status post   hysterectomy.       Peritoneum/Retroperitoneum: No adenopathy, mesenteric stranding or free   fluid.  Aortic caliber is normal.       Bones/Soft Tissues: No acute findings.        Assessment:     Patient Active Problem List    Diagnosis Date Noted    Abnormal ECG     Aortic valve sclerosis     PNA (pneumonia) 09/19/2021    Pneumonia 09/19/2021    Closed fracture of proximal end of left humerus 12/13/2016    Coronary artery disease involving native coronary artery of native heart without angina pectoris 06/08/2016    PVC (premature ventricular contraction) 03/27/2015    Palpitations 03/27/2015    Gross hematuria 01/05/2015    Obstructive uropathy 01/05/2015    Chest pain 10/21/2014    Hypertension 10/21/2014    Hyperlipidemia 10/21/2014       Acute febrile illness, onset ~7-10d ago   Presenting symptoms - myalgia, malaise, weakness, headache     Leukopenia / lymphopenia - now resolving     TCP - now trending upward    Mild elevation of transaminases      Jj GGO on CT chest   COVID tests x3 negative, rapid flu negative  BC negative at 72 hours though collected after initiation of abx   Legionella, pneumococcal ags pending     Encephalopathy, this is more prominent today      Acute hypoxemic respiratory failure   Diffuse GGO on CT  ? Atypical CAP vs inflammatory vs other viral process  No history of immunocompromise to raise concern of PJP though not excluded     Allergy pcn  Intolerant of sulfa     Plan:     Clinical picture highly suggestive of COVID-19, multiple HH members with COVID, yet, COVID tests x3 negative   Other atypical or inflammatory pneumonia possible     Her fever seems to have resolved and the lab abnormalities are trending to normal   Yet, more encephalopathic today     Favor stopping vanc and meropenem, clinical picture less consistent with acute bacterial infection and she does not have specific RF for resistant pathogens     Continue azithromycin for now for possible atypical pneumonia   MRI brain, discussed with Hospitalist   HIV screen for completeness    Discussed with GINA Magallon MD  Phone: [phone]   Fax : [phone]

## 2021-09-24 NOTE — PROGRESS NOTES
Nephrology Progress  Note                                                                                                                                                                                                                                                                                                                                                               Office : [phone]     Fax :[phone]              Patient's Name: Luis Guerrero  11:05 AM  9/24/2021    Reason for Consult:  Metabolic acidosis , hyperkalemia   Requesting Physician:  Kyree Ghotra      Chief Complaint:  Chest pain       Interval History : On 1 L O2   Neutropenia : improved   BP soft    No diarrhea  No sob  No chest pain  No fever          History of Present Ilness:    Luis Guerrero is a 66 y.o. female with PMH of CAD  who presented to [facility] with feelings of sob, cough, fatigue. Malaise  and mild chest pain.       Pt found to have mild THEO, CXR demonstrated small left pna. VSS.  No hypoxia   ECG demonstrated New LBBB       Nephrology consult for management of hyperkalemia and metabolic acidosis       Past Medical History:   Diagnosis Date    Arthritis     Asthma     Diabetes mellitus ([address]     type 2, takes PO meds    History of palpitations     Hyperlipidemia     Hypertension        Past Surgical History:   Procedure Laterality Date    CATARACT REMOVAL WITH IMPLANT Right 10/18/2017    entered to epic from h&P    CATARACT REMOVAL WITH IMPLANT Left 11/08/2017    CHOLECYSTECTOMY      COLONOSCOPY      CORONARY ANGIOPLASTY  10/30/14    CYST REMOVAL      from right wrist     ENDOSCOPY, COLON, DIAGNOSTIC      GASTRIC BYPASS SURGERY  2005    HERNIA REPAIR      HYSTERECTOMY      age 30    OTHER SURGICAL HISTORY  CYSTOSCOPY, RIGHT URETERAL STONE MANIPULATION WITH RIGHT    SHOULDER ARTHROPLASTY Left 12/15/2016    LEFT PROXIMAL HUMERUS OPEN REDUCTION INTERNAL FIXATION     TONSILLECTOMY PRN  famotidine (PEPCID) tablet 20 mg, BID  guaiFENesin-dextromethorphan (ROBITUSSIN DM) 100-10 MG/5ML syrup 5 mL, Q4H PRN  Vitamin D (CHOLECALCIFEROL) tablet 2,000 Units, Daily  0.9 % sodium chloride infusion, PRN  acetaminophen (TYLENOL) tablet 650 mg, Q6H PRN   Or  acetaminophen (TYLENOL) suppository 650 mg, Q6H PRN  aspirin chewable tablet 81 mg, Daily  atorvastatin (LIPITOR) tablet 40 mg, Nightly  melatonin disintegrating tablet 5 mg, Nightly  cyclobenzaprine (FLEXERIL) tablet 10 mg, TID PRN        Review of Systems:   14 point ROS obtained but were negative except mentioned in HPI      Physical exam:     Vitals:  /72   Pulse 75   Temp 98.3 °F (36.8 °C) (Oral)   Resp 16   Ht 5' 3\" (1.6 m)   Wt 160 lb 11.2 oz (72.9 kg)   SpO2 92%   BMI 28.47 kg/m²   Constitutional:  OAA X3 NAD  Skin: no rash, turgor wnl  Heent:  eomi, mmm  Neck: no bruits or jvd noted  Cardiovascular:  S1, S2 without m/r/g  Respiratory: CTA B without w/r/r  Abdomen:  +bs, soft, nt, nd  Ext: no  lower extremity edema  Psychiatric: mood and affect appropriate  Musculoskeletal:  Rom, muscular strength intact    Data:   Labs:  CBC:   Recent Labs     09/22/21  1144 09/23/21  0603 09/24/21  0615   WBC 3.3* 2.9* 5.5   HGB 11.5* 11.5* 12.0   * 97* 113*     BMP:    Recent Labs     09/22/21  1144 09/23/21  0603 09/24/21  0615   * 130* 134*   K 3.7 4.4 4.3    98* 104   CO2 25 21 20*   BUN 18 14 19   CREATININE 0.9 0.9 0.9   GLUCOSE 118* 108* 112*     Ca/Mg/Phos:   Recent Labs     09/22/21  1144 09/23/21  0603 09/24/21  0615   CALCIUM 8.2* 8.1* 8.1*     Hepatic:   Recent Labs     09/23/21  0603   AST 68*   ALT 34   BILITOT 0.6   ALKPHOS 49     Troponin:   No results for input(s): TROPONINI in the last 72 hours. BNP: No results for input(s): BNP in the last 72 hours. Lipids: No results for input(s): CHOL, TRIG, HDL, LDLCALC, LABVLDL in the last 72 hours.   ABGs: No results for input(s): PHART, PO2ART, [id number] in the last 72 hours.  INR: No results for input(s): INR in the last 72 hours. UA:  No results for input(s): Brain Douse, GLUCOSEU, BILIRUBINUR, KETUA, SPECGRAV, BLOODU, PHUR, PROTEINU, UROBILINOGEN, NITRU, LEUKOCYTESUR, Cherrie Jessica in the last 72 hours. Urine Microscopic:   No results for input(s): LABCAST, BACTERIA, COMU, HYALCAST, WBCUA, RBCUA, EPIU in the last 72 hours. Urine Culture:   Recent Labs     09/21/21 2127   LABURIN No growth at 18 to 36 hours     Urine Chemistry: No results for input(s): CLUR, LABCREA, PROTEINUR, NAUR in the last 72 hours. IMAGING:  CT CHEST ABDOMEN PELVIS WO CONTRAST   Final Result   1. Commonly reported imaging features of COVID-19 pneumonia are present. Other processes such as influenza pneumonia and organizing pneumonia, as can   be seen with drug toxicity and connective tissue disease, can cause a similar   imaging pattern. PneTyp   2. No acute findings in the abdomen and pelvis. XR CHEST PORTABLE   Final Result   Increasing airspace opacity left lower lobe         XR CHEST PORTABLE   Final Result   Small left basilar pneumonia. XR HIP RIGHT (2-3 VIEWS)   Final Result   No acute finding of the right hip. Assessment/Plan     1. AGMA    Anion Gap :   15   -----> 17  ----> 14    Serum Bicarb : 19   ------> 15 -----> 18    Denies diarrhea   Denies vomiting    S/p IVF  NS  On 9/19 , 9/20     Plan      DC IVF NS    Give bicarb containing IVF    Hold BP medicine                   2. Hyperkalemia in setting of metabolic acidosis     F/u BMP      S/p lokelma     Serum K   5.1 ----> 5.7 ------> 4.9     Avoid ACEI or ARB    Low K diet     3 THEO    Serum cr improved from 1.4 to 1.2    Avoid contrast    Avoid ACEI OR ARB      3. CAD    4.  Aortic sclerosis     5 HTN    6 DLP                     Thank you for allowing us to participate in care of Christopher Stevens MD  Feel free to contact me   [facility]  Office : [phone]  Fax :[phone]

## 2021-09-24 NOTE — PROGRESS NOTES
Physician Progress Note      Christa Richmond  [facility] #:                  [id number]  :                       1950  ADMIT DATE:       2021 9:07 AM  DISCH DATE:  RESPONDING  PROVIDER #:        Nas Ferguson MD          QUERY TEXT:    Patient admitted with pneumonia and sepsis. Noted documentation of acute   respiratory failure in progress note . In order to support the diagnosis   of acute respiratory failure, please include additional clinical indicators in   your documentation. Or please document if the diagnosis of acute respiratory   failure has been ruled out after further study. The medical record reflects the following:  Risk Factors: Pneumonia, THEO, sepsis  Clinical Indicators: 88-97% on room air, 1L NC, RR 16-30, Per progress note    \"Respiratory:  Normal respiratory effort. Reduced air entry, bilaterally   without Rales/Wheezes/Rhonchi\"  Treatment: supplemental oxygen, IV antibiotics, serial labs, supportive care. Acute Respiratory Failure Clinical Indicators per  MS-DRG Training Guide and   Quick Reference Guide:  pO2 < 60 mmHg or SpO2 (pulse oximetry) < 91% breathing room air  pCO2 > 50 and pH < 7.35  P/F ratio (pO2 / FIO2) < 300  pO2 decrease or pCO2 increase by 10 mmHg from baseline (if known)  Supplemental oxygen of 40% or more  Presence of respiratory distress, tachypnea, dyspnea, shortness of breath,   wheezing  Unable to speak in complete sentences  Use of accessory muscles to breathe  Extreme anxiety and feeling of impending doom  Tripod position  Confusion/altered mental status/obtunded    Thank you,  Leonides Morejon RN [email or web address]  Options provided:  -- Acute Respiratory Failure as evidenced by, Please document evidence.   -- Acute Respiratory Failure ruled out after study  -- Other - I will add my own diagnosis  -- Disagree - Not applicable / Not valid  -- Disagree - Clinically unable to determine / Unknown  -- Refer to Clinical Documentation Reviewer    PROVIDER RESPONSE TEXT:    Provider is clinically unable to determine a response to this query.     Query created by: Rayshawn Riggs on 9/23/2021 11:20 AM      Electronically signed by:  Héctor Tobin MD 9/24/2021 9:06 AM

## 2021-09-24 NOTE — PROGRESS NOTES
Hospitalist Progress Note      PCP: Kyree Ghotra    Date of Admission: 9/19/2021    Chief Complaint: C/O concerns for COVID and mild chest pain     Hospital Course:   66 y.o. female who presented to [facility] with feelings of sob, cough, fatigue. Malaise  and mild chest pain. PT is vaccinated and her family has + covid currently. She is concerned that she is covid + as well.      Pt found to have mild THEO, CXR demonstrated small left pna. VSS.  No hypoxia   ECG demonstrated New LBBB      Pt will be admitted for further evalaution and treatment     Subjective:   Change in mental status, slow to answer the questions, no complaint of dizziness, syncope, palpitation, focal neurological weakness  Fever continues  Cough, shortness of breath on room air, comfortable on oxygen  No nausea or vomiting  Tired   Appetite is not great  Denies diarrhea or abdominal pain  No urinary complaints    Medications:  Reviewed    Infusion Medications    sodium bicarbonate infusion 75 mL/hr at 09/24/21 1256    sodium chloride      dextrose      sodium chloride       Scheduled Medications    sodium chloride flush  5-40 mL IntraVENous 2 times per day    azithromycin  500 mg Oral Daily    metoprolol succinate  25 mg Oral Daily    magnesium oxide  400 mg Oral BID    aspirin  324 mg Oral Once    insulin lispro  0-12 Units SubCUTAneous TID WC    insulin lispro  0-6 Units SubCUTAneous Nightly    sodium chloride flush  5-40 mL IntraVENous 2 times per day    enoxaparin  30 mg SubCUTAneous BID    famotidine  20 mg Oral BID    Vitamin D  2,000 Units Oral Daily    aspirin  81 mg Oral Daily    atorvastatin  40 mg Oral Nightly    melatonin  5 mg Oral Nightly     PRN Meds: sodium chloride, perflutren lipid microspheres, glucose, dextrose, glucagon (rDNA), dextrose, sodium chloride flush, ondansetron **OR** ondansetron, polyethylene glycol, guaiFENesin-dextromethorphan, sodium chloride, acetaminophen **OR** acetaminophen, cyclobenzaprine      Intake/Output Summary (Last 24 hours) at 9/24/2021 1317  Last data filed at 9/24/2021 1041  Gross per 24 hour   Intake 360 ml   Output 900 ml   Net -540 ml       Physical Exam Performed:    /68   Pulse 74   Temp 100.2 °F (37.9 °C) (Oral)   Resp 16   Ht 5' 3\" (1.6 m)   Wt 160 lb 11.2 oz (72.9 kg)   SpO2 91%   BMI 28.47 kg/m²     General appearance: No apparent distress, appears stated age . On oxygen  HEENT: . Conjunctivae/corneas clear. Neck: Supple, with full range of motion. No jugular venous distention. Trachea midline. Respiratory:  Normal respiratory effort. Reduced air entry, bilaterally without Rales/Wheezes/Rhonchi. Cardiovascular: Regular rate and rhythm with normal S1/S2 without murmurs, rubs or gallops. Abdomen: Soft, non-tender, non-distended with normal bowel sounds. Musculoskeletal: No clubbing, cyanosis or edema bilaterally. Full range of motion without deformity. Skin: Skin color, texture, turgor normal.  No rashes or lesions. Neurologic:  Neurovascularly intact without any focal sensory/motor deficits. Mental status altered-slow to respond and difficult to comprehend  Psychiatric: Awake, confused/forgetful/slow to response,   Capillary Refill: Brisk,3 seconds, normal   Peripheral Pulses: +2 palpable, equal bilaterally       Labs:   Recent Labs     09/22/21  1144 09/23/21  0603 09/24/21  0615   WBC 3.3* 2.9* 5.5   HGB 11.5* 11.5* 12.0   HCT 36.7 36.3 37.6   * 97* 113*     Recent Labs     09/22/21  1144 09/23/21  0603 09/24/21  0615   * 130* 134*   K 3.7 4.4 4.3    98* 104   CO2 25 21 20*   BUN 18 14 19   CREATININE 0.9 0.9 0.9   CALCIUM 8.2* 8.1* 8.1*     Recent Labs     09/23/21  0603   AST 68*   ALT 34   BILIDIR <0.2   BILITOT 0.6   ALKPHOS 49     No results for input(s): INR in the last 72 hours. No results for input(s): Regis Jakes in the last 72 hours.     Urinalysis:      Lab Results   Component Value Date    NITRU Negative 09/19/2021    WBCUA 0-2 09/19/2021    RBCUA 0-2 09/19/2021    BLOODU TRACE-INTACT 09/19/2021    SPECGRAV 1.010 09/19/2021    GLUCOSEU >=1000 09/19/2021       Radiology:  CT CHEST ABDOMEN PELVIS WO CONTRAST   Final Result   1. Commonly reported imaging features of COVID-19 pneumonia are present. Other processes such as influenza pneumonia and organizing pneumonia, as can   be seen with drug toxicity and connective tissue disease, can cause a similar   imaging pattern. PneTyp   2. No acute findings in the abdomen and pelvis. XR CHEST PORTABLE   Final Result   Increasing airspace opacity left lower lobe         XR CHEST PORTABLE   Final Result   Small left basilar pneumonia. XR HIP RIGHT (2-3 VIEWS)   Final Result   No acute finding of the right hip. MRI BRAIN W WO CONTRAST    (Results Pending)           Assessment/Plan:    Active Hospital Problems    Diagnosis     Abnormal ECG [R94.31]     Aortic valve sclerosis [I35.8]     PNA (pneumonia) [J18.9]     Pneumonia [J18.9]     Coronary artery disease involving native coronary artery of native heart without angina pectoris [I25.10]     PVC (premature ventricular contraction) [I49.3]     Hypertension [I10]     Hyperlipidemia [E78.5]     Chest pain [R07.9]      67 yo female vaccinated presents with mild chest pain, fatigue, sob, general aches and pains pt concerned for COVID    COVID -19-ruled out rapid and PCR are negative     Ongoing fever and change in mental status  PNA-left base, -worsening with acute hypoxic respiratory failure and fever? HAP-currently on oxygen, broaden the antibiotics Vanco and cefepime  Persistent fever   ID consulted -added Zithromax 9/23  DC'd Vanco and Maxipime  Given ongoing fever and confusion-MRI of the brain with and without contrast    Early sepsis-secondary to above    Asthma: Exacerbation-resolved s/p steroid     THEO: pre renal, low volume, poor appetite last few days and was taking BP meds  ACE. HCTZ combo, hold for now   Repeat BMP pending-improved     HTN: controlled: cont BB only for now      DM: reports controlled   - hold orals, SSI while IP, titrate as needed   - A1C pending      Mild chest pain- low suspicion of ACS, however does have CAD hx and new lBBB on ECG. Add asa and ask Cardiology to see for ischemic work up needs   - initial trop negtaiv, serial   Cardiology input appreciated and following  Patient was awaiting for cardiac cath on 24 September as an outpatient to evaluate for obstructive coronary artery, may wait till infection clears,    Mild thrombocytopenia-possibly secondary to sepsis -monitor     HLD- cont statin     A -a gap acidosis / hyperkalemia - \s/p hco3 infusion, [facility] nephrology input appreciated - resolved   As of low bicarb-sodium bicarb infusion is restarted      DVT Prophylaxis: Lovenox  Diet: ADULT DIET;  Regular; 4 carb choices (60 gm/meal)  Adult Oral Nutrition Supplement; Diabetic Oral Supplement  Code Status: Full Code    PT/OT Eval Status: Input appreciated,     Dispo -pending improvement   Discussed with ID    Je Doyle MD

## 2021-09-24 NOTE — PROGRESS NOTES
Physical Therapy    Facility/Department: [facility] C3 TELE/MED SURG/ONC  Re-Evaluation and Treatment    NAME: Marshall Chatman  : 1950  MRN: [id number]    Date of Service: 2021    Discharge Recommendations:  Home with Home health PT, 24 hour supervision or assist   PT Equipment Recommendations  Equipment Needed: Yes  Other: anticipate would benefit from RW    Assessment   Body structures, Functions, Activity limitations: Decreased functional mobility ; Decreased balance;Decreased endurance  Assessment: Pt is 65 yo female who presents for re-evaluation with diagnosis of SOB. Pt reports not feeling well including nausea and dizziness. Does demonstrate drop in BP with sitting and standing (see activity tolerance section). Pt SBA for sit<>Stand this date. Declined gait training d/t fatigue. Anticipate pt would benefit from RW d/t generalized fatigue from not feeling well. Pt in agreement. Pt would benefit from continued skilled therapy to address deficits. Recommend home with 24-hr supervision and home PT at d/c. Treatment Diagnosis: impaired functional mobility  Specific instructions for Next Treatment: progress mobility as tolerated  Prognosis: Good  Decision Making: Low Complexity  PT Education: PT Role;General Safety; Energy Conservation;Disease Specific Education;Goals; Functional Mobility Training;Transfer Training;Plan of Care  Patient Education: Pt edu on energy conservation, PLB and role of PT in acute care- pt verbalizes understanding  Barriers to Learning: none  REQUIRES PT FOLLOW UP: Yes  Activity Tolerance  Activity Tolerance: Patient limited by fatigue;Patient limited by endurance  Activity Tolerance: Seated EOB /61, HR 76; Standing BP 92/56, HR 78; Pt returned to bed BP 99/51, HR 75; SpO2 89-90% on 2L       Patient Diagnosis(es): The primary encounter diagnosis was THEO (acute kidney injury) ([facility] [address]  Diagnoses of Shortness of breath and Chest pain, unspecified type were also pertinent to this visit.     has a past medical history of Arthritis, Asthma, Diabetes mellitus ([address] History of palpitations, Hyperlipidemia, and Hypertension. has a past surgical history that includes Cholecystectomy; Gastric bypass surgery (2005); Hysterectomy; hernia repair; cyst removal; Tonsillectomy; Colonoscopy; Endoscopy, colon, diagnostic; Coronary angioplasty (10/30/14); other surgical history (CYSTOSCOPY, RIGHT URETERAL STONE MANIPULATION WITH RIGHT); Total shoulder arthroplasty (Left, 12/15/2016); Cataract removal with implant (Right, 10/18/2017); and Cataract removal with implant (Left, 11/08/2017).     Restrictions  Restrictions/Precautions  Restrictions/Precautions: Contact Precautions, Up as Tolerated  Position Activity Restriction  Other position/activity restrictions: IV  Vision/Hearing  Vision: Impaired  Vision Exceptions: Wears glasses for reading  Hearing: Within functional limits     Subjective  General  Chart Reviewed: Yes  Patient assessed for rehabilitation services?: Yes  Response To Previous Treatment: Not applicable  Family / Caregiver Present: No  Referring Practitioner: Dr. Alexi Leach MD  Referral Date : 09/24/21 (Re-Evaluation)  Diagnosis: SOB  Follows Commands: Within Functional Limits  General Comment  Comments: RN cleared pt for therapy eval  Subjective  Subjective: Pt resting in bed on approach; agreeable to therapy but reports not feeling well  Pain Screening  Patient Currently in Pain: Denies    Orientation  Orientation  Overall Orientation Status: Within Functional Limits  Social/Functional History  Social/Functional History  Lives With: Family  Type of Home: House  Home Layout: Two level, Performs ADL's on one level, Able to Live on Main level with bedroom/bathroom  Home Access: Stairs to enter without rails  Entrance Stairs - Number of Steps: 2  Bathroom Shower/Tub: Walk-in shower  Bathroom Toilet: Standard  Bathroom Equipment: Shower chair  Home Equipment:  (no DME)  ADL Assistance: Independent  Homemaking Assistance: Independent  Ambulation Assistance: Independent  Transfer Assistance: Independent  Active : Yes  Additional Comments: Began to have falls 9/17/21 (1x in bathroom). One fall 1 year ago    Objective     RLE AROM: WFL  LLE AROM : WFL  Strength RLE: WFL  Strength LLE: WFL     Sensation  Overall Sensation Status: WFL     Bed mobility  Supine to Sit: Supervision  Sit to Supine: Supervision     Transfers  Sit to Stand: Stand by assistance  Stand to sit: Stand by assistance     Ambulation  Ambulation?: No (pt declined d/t not feeling well)     Balance  Sitting - Static: Good  Sitting - Dynamic: Good  Standing - Static: Good;-        Plan   Plan  Times per week: 3-5x/wk  Times per day: Daily  Specific instructions for Next Treatment: progress mobility as tolerated  Current Treatment Recommendations: Strengthening, Neuromuscular Re-education, Home Exercise Program, Safety Education & Training, Balance Training, Endurance Training, Transfer Training, Gait Training, Stair training, Functional Mobility Training, Equipment Evaluation, Education, & procurement, Patient/Caregiver Education & Training  Safety Devices  Type of devices: All fall risk precautions in place, Call light within reach, Gait belt, Patient at risk for falls, Left in bed, Nurse notified, Bed alarm in place                                   AM-PAC Score  AM-PAC Inpatient Mobility Raw Score : 20 (09/24/21 1437)  AM-PAC Inpatient T-Scale Score : 47.67 (09/24/21 1437)  Mobility Inpatient CMS 0-100% Score: 35.83 (09/24/21 1437)  Mobility Inpatient CMS G-Code Modifier : Joni Troncoso (09/24/21 1437)          Goals  Short term goals  Time Frame for Short term goals: 1 week (10/01) unless otherwise specified  Short term goal 1: Pt will be mod I with bed mobility. Short term goal 2: Pt will be mod I with transfers with LRAD. Short term goal 3: Pt will ambulate 50 ft with supervision and LRAD.   Short term goal 4: Pt will negotiate 2 stairs with rail and SBA. Short term goal 5: 9/27: Pt will participate in 12-15 reps of BLE exercises to promote strength and activity tolerance. Patient Goals   Patient goals : \"to go home\"       Therapy Time   Individual Concurrent Group Co-treatment   Time In 1332         Time Out 1350         Minutes 18         Timed Code Treatment Minutes: 8 Minutes       Gee Gonzalez, PT, DPT  If pt is unable to be seen after this session, please let this note serve as discharge summary. Please see case management note for discharge disposition. Thank you.

## 2021-09-24 NOTE — PROGRESS NOTES
Home prescription of hydrocodone found in pt's bed. Only 2 pills remaining in bottle. Unknown if pt took any or how many were in bottle when pt was admitted. Pt denies taking home meds. Urine tox ordered. Pt continues to be lethargic and fatigued. MD paged and made aware. VSS. Home meds secured per protocol.

## 2021-09-25 ENCOUNTER — APPOINTMENT (OUTPATIENT)
Dept: GENERAL RADIOLOGY | Age: 71
DRG: 871 | End: 2021-09-25
Payer: MEDICARE

## 2021-09-25 LAB
ANION GAP SERPL CALCULATED.3IONS-SCNC: 13 MMOL/L (ref 3–16)
BASOPHILS ABSOLUTE: 0 K/UL (ref 0–0.2)
BASOPHILS RELATIVE PERCENT: 0.1 %
BUN BLDV-MCNC: 13 MG/DL (ref 7–20)
CALCIUM SERPL-MCNC: 8 MG/DL (ref 8.3–10.6)
CHLORIDE BLD-SCNC: 101 MMOL/L (ref 99–110)
CO2: 20 MMOL/L (ref 21–32)
CREAT SERPL-MCNC: 0.7 MG/DL (ref 0.6–1.2)
CULTURE, BLOOD 2: NORMAL
EOSINOPHILS ABSOLUTE: 0 K/UL (ref 0–0.6)
EOSINOPHILS RELATIVE PERCENT: 0.1 %
GFR AFRICAN AMERICAN: >60
GFR NON-AFRICAN AMERICAN: >60
GLUCOSE BLD-MCNC: 118 MG/DL (ref 70–99)
GLUCOSE BLD-MCNC: 128 MG/DL (ref 70–99)
GLUCOSE BLD-MCNC: 165 MG/DL (ref 70–99)
GLUCOSE BLD-MCNC: 193 MG/DL (ref 70–99)
HCT VFR BLD CALC: 35.9 % (ref 36–48)
HEMOGLOBIN: 11.5 G/DL (ref 12–16)
L. PNEUMOPHILA SEROGP 1 UR AG: NORMAL
LYMPHOCYTES ABSOLUTE: 0.7 K/UL (ref 1–5.1)
LYMPHOCYTES RELATIVE PERCENT: 9.9 %
MCH RBC QN AUTO: 28.2 PG (ref 26–34)
MCHC RBC AUTO-ENTMCNC: 32 G/DL (ref 31–36)
MCV RBC AUTO: 88.3 FL (ref 80–100)
MONOCYTES ABSOLUTE: 0.2 K/UL (ref 0–1.3)
MONOCYTES RELATIVE PERCENT: 2.5 %
NEUTROPHILS ABSOLUTE: 6.4 K/UL (ref 1.7–7.7)
NEUTROPHILS RELATIVE PERCENT: 87.4 %
PDW BLD-RTO: 23.9 % (ref 12.4–15.4)
PERFORMED ON: ABNORMAL
PLATELET # BLD: 125 K/UL (ref 135–450)
PLATELET SLIDE REVIEW: ABNORMAL
PMV BLD AUTO: 8.6 FL (ref 5–10.5)
POTASSIUM SERPL-SCNC: 4.1 MMOL/L (ref 3.5–5.1)
RBC # BLD: 4.07 M/UL (ref 4–5.2)
SLIDE REVIEW: ABNORMAL
SODIUM BLD-SCNC: 134 MMOL/L (ref 136–145)
STREP PNEUMONIAE ANTIGEN, URINE: NORMAL
WBC # BLD: 7.3 K/UL (ref 4–11)

## 2021-09-25 PROCEDURE — 6370000000 HC RX 637 (ALT 250 FOR IP): Performed by: INTERNAL MEDICINE

## 2021-09-25 PROCEDURE — 2700000000 HC OXYGEN THERAPY PER DAY

## 2021-09-25 PROCEDURE — 71045 X-RAY EXAM CHEST 1 VIEW: CPT

## 2021-09-25 PROCEDURE — 6370000000 HC RX 637 (ALT 250 FOR IP): Performed by: NURSE PRACTITIONER

## 2021-09-25 PROCEDURE — 97530 THERAPEUTIC ACTIVITIES: CPT

## 2021-09-25 PROCEDURE — 80048 BASIC METABOLIC PNL TOTAL CA: CPT

## 2021-09-25 PROCEDURE — 36415 COLL VENOUS BLD VENIPUNCTURE: CPT

## 2021-09-25 PROCEDURE — 6360000002 HC RX W HCPCS: Performed by: NURSE PRACTITIONER

## 2021-09-25 PROCEDURE — 99232 SBSQ HOSP IP/OBS MODERATE 35: CPT | Performed by: HOSPITALIST

## 2021-09-25 PROCEDURE — 94761 N-INVAS EAR/PLS OXIMETRY MLT: CPT

## 2021-09-25 PROCEDURE — 1200000000 HC SEMI PRIVATE

## 2021-09-25 PROCEDURE — 2580000003 HC RX 258: Performed by: HOSPITALIST

## 2021-09-25 PROCEDURE — 97116 GAIT TRAINING THERAPY: CPT

## 2021-09-25 PROCEDURE — 6360000002 HC RX W HCPCS: Performed by: HOSPITALIST

## 2021-09-25 PROCEDURE — 85025 COMPLETE CBC W/AUTO DIFF WBC: CPT

## 2021-09-25 PROCEDURE — 2500000003 HC RX 250 WO HCPCS: Performed by: HOSPITALIST

## 2021-09-25 PROCEDURE — 97535 SELF CARE MNGMENT TRAINING: CPT

## 2021-09-25 RX ORDER — FUROSEMIDE 10 MG/ML
40 INJECTION INTRAMUSCULAR; INTRAVENOUS ONCE
Status: COMPLETED | OUTPATIENT
Start: 2021-09-25 | End: 2021-09-25

## 2021-09-25 RX ADMIN — ASPIRIN 81 MG: 81 TABLET, CHEWABLE ORAL at 09:46

## 2021-09-25 RX ADMIN — ATORVASTATIN CALCIUM 40 MG: 10 TABLET, FILM COATED ORAL at 20:18

## 2021-09-25 RX ADMIN — FAMOTIDINE 20 MG: 20 TABLET ORAL at 20:18

## 2021-09-25 RX ADMIN — AZITHROMYCIN DIHYDRATE 500 MG: 250 TABLET, FILM COATED ORAL at 09:46

## 2021-09-25 RX ADMIN — METOPROLOL SUCCINATE 25 MG: 25 TABLET, EXTENDED RELEASE ORAL at 11:22

## 2021-09-25 RX ADMIN — SODIUM BICARBONATE: 84 INJECTION, SOLUTION INTRAVENOUS at 13:49

## 2021-09-25 RX ADMIN — SODIUM BICARBONATE: 84 INJECTION, SOLUTION INTRAVENOUS at 04:40

## 2021-09-25 RX ADMIN — Medication 2000 UNITS: at 09:46

## 2021-09-25 RX ADMIN — FUROSEMIDE 40 MG: 10 INJECTION, SOLUTION INTRAMUSCULAR; INTRAVENOUS at 13:34

## 2021-09-25 RX ADMIN — INSULIN LISPRO 1 UNITS: 100 INJECTION, SOLUTION INTRAVENOUS; SUBCUTANEOUS at 20:24

## 2021-09-25 RX ADMIN — INSULIN LISPRO 2 UNITS: 100 INJECTION, SOLUTION INTRAVENOUS; SUBCUTANEOUS at 17:06

## 2021-09-25 RX ADMIN — FAMOTIDINE 20 MG: 20 TABLET ORAL at 09:46

## 2021-09-25 RX ADMIN — MAGNESIUM GLUCONATE 500 MG ORAL TABLET 400 MG: 500 TABLET ORAL at 09:46

## 2021-09-25 RX ADMIN — MAGNESIUM GLUCONATE 500 MG ORAL TABLET 400 MG: 500 TABLET ORAL at 20:18

## 2021-09-25 NOTE — PLAN OF CARE
Problem: Falls - Risk of:  Goal: Will remain free from falls  Description: Will remain free from falls  Outcome: Ongoing  Goal: Absence of physical injury  Description: Absence of physical injury  Outcome: Ongoing     Problem: Nutrition  Goal: Optimal nutrition therapy  Outcome: Ongoing     Problem: Pain:  Goal: Control of acute pain  Description: Control of acute pain  Outcome: Ongoing

## 2021-09-25 NOTE — PROGRESS NOTES
Diagnosis(es): The primary encounter diagnosis was THEO (acute kidney injury) ([facility] [address] Diagnoses of Shortness of breath and Chest pain, unspecified type were also pertinent to this visit. has a past medical history of Arthritis, Asthma, Diabetes mellitus ([facility] [address] History of palpitations, Hyperlipidemia, and Hypertension. has a past surgical history that includes Cholecystectomy; Gastric bypass surgery (2005); Hysterectomy; hernia repair; cyst removal; Tonsillectomy; Colonoscopy; Endoscopy, colon, diagnostic; Coronary angioplasty (10/30/14); other surgical history (CYSTOSCOPY, RIGHT URETERAL STONE MANIPULATION WITH RIGHT); Total shoulder arthroplasty (Left, 12/15/2016); Cataract removal with implant (Right, 10/18/2017); and Cataract removal with implant (Left, 11/08/2017). Restrictions  Restrictions/Precautions  Restrictions/Precautions: Contact Precautions, Up as Tolerated  Position Activity Restriction  Other position/activity restrictions: IV  Subjective   General  Chart Reviewed: Yes  Patient assessed for rehabilitation services?: Yes  Family / Caregiver Present: No  Referring Practitioner: WILLIS Santillan MD  Diagnosis: colitis, N/V, UTI  Subjective  Subjective: Pt supine, agreeable with encouragement  General Comment  Comments: RN approved therapy  Vital Signs  Patient Currently in Pain: Denies   Orientation  Orientation  Overall Orientation Status: Within Functional Limits  Objective    ADL  Feeding: Independent  Grooming: Stand by assistance (combing hair seated in chair)  Additional Comments: pt refusing ADL tasks this date        Balance  Sitting Balance: Stand by assistance  Standing Balance: Contact guard assistance  Functional Mobility  Functional - Mobility Device: Rolling Walker  Activity: Other  Assist Level: Minimal assistance  Functional Mobility Comments: Min A to manage RW  Bed mobility  Supine to Sit: Stand by assistance  Sit to Supine: Unable to assess  Scooting: Stand by assistance

## 2021-09-25 NOTE — PROGRESS NOTES
Message sent to hospitalist \"Pt got up to chair and is now 88-91% on 6L, respirations are shallow, she said it causes right lung pain when she takes a deep breath and she also states she has right sided abd pain. She describes it as a sharp pain 10/10. \"

## 2021-09-25 NOTE — PROGRESS NOTES
Physical Therapy    Facility/Department: [facility] C3 TELE/MED SURG/ONC  Daily Treatment Note  NAME: Varghese Powers  : 1950  MRN: [id number]    Date of Service: 2021    Discharge Recommendations:  Subacute/Skilled Nursing Facility   PT Equipment Recommendations  Equipment Needed: No (defer to facility)    Assessment   Body structures, Functions, Activity limitations: Decreased functional mobility ; Decreased balance;Decreased endurance;Decreased safe awareness;Decreased posture  Assessment: Pt tolerated treatment session fair this date with SPO2 desaturation and reports of dizziness with all mobility limiting further mobility progression. Pt is well below her baseline level of function and based on performance this date, feel Pt is most appropriate for SNF level of care at d/c when medically appropriate to maximize return to PLOF and reduce risk of falls. Will continue to progress PT per current POC while inpatient. Treatment Diagnosis: functional mobility deficit; impaired aerobic capacity  Specific instructions for Next Treatment: progress mobility as tolerated  Prognosis: Good  Decision Making: Medium Complexity  PT Education: PT Role;General Safety; Energy Conservation;Disease Specific Education;Goals; Functional Mobility Training;Transfer Training;Plan of Care;Gait Training (D/C recommendations)  Patient Education: Pt will require reinforcement  Barriers to Learning: safety awareness/insight into deficits  REQUIRES PT FOLLOW UP: Yes  Activity Tolerance  Activity Tolerance: Treatment limited secondary to medical complications (free text); Patient limited by fatigue  Activity Tolerance: Resting vitals: 93% on 2L; HR 82 BPM; /68 (81); SPO2 dropped to 87-89% on 2L at EOB, increased to 4L with recovery to 89-91%. /70 (85) RN present and aware and increased to 6L prior to ambulation; SPO2 dropped to 78%?  with gait, recovered to 88-92% on 6L in chair; BP was 106/67 (80)     Patient Diagnosis(es): The primary encounter diagnosis was THEO (acute kidney injury) ([facility] [address] Diagnoses of Shortness of breath and Chest pain, unspecified type were also pertinent to this visit. has a past medical history of Arthritis, Asthma, Diabetes mellitus ([facility] [address] History of palpitations, Hyperlipidemia, and Hypertension. has a past surgical history that includes Cholecystectomy; Gastric bypass surgery (2005); Hysterectomy; hernia repair; cyst removal; Tonsillectomy; Colonoscopy; Endoscopy, colon, diagnostic; Coronary angioplasty (10/30/14); other surgical history (CYSTOSCOPY, RIGHT URETERAL STONE MANIPULATION WITH RIGHT); Total shoulder arthroplasty (Left, 12/15/2016); Cataract removal with implant (Right, 10/18/2017); and Cataract removal with implant (Left, 11/08/2017). 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 Family / Caregiver Present: No  Referring Practitioner: Dr. Hari Roman MD  Subjective  Subjective: Pt was agreeable to PT treatment. General Comment  Comments: Pt was in bed upon arrival to room and ARN aware of PT treatment. RN present through majority of session to aide in O2 management. Pain Screening  Patient Currently in Pain: Denies (Pt denies pain at rest but reports pain in R side with attempts at deep breathing. RN present and aware)  Vital Signs  Patient Currently in Pain: Denies (Pt denies pain at rest but reports pain in R side with attempts at deep breathing.  RN present and aware)       Orientation  Orientation  Overall Orientation Status: Within Functional Limits (Delayed with month and year and required choices for year)  Cognition   Cognition  Overall Cognitive Status: Exceptions  Arousal/Alertness: Delayed responses to stimuli  Following Commands: Follows one step commands with increased time; Follows one step commands with repetition  Attention Span: Attends with cues to redirect  Memory: Decreased recall of recent events  Safety Judgement: Decreased awareness of need for safety  Problem Solving: Decreased awareness of errors;Assistance required to correct errors made  Insights: Decreased awareness of deficits  Sequencing: Requires cues for some  Cognition Comment: RN present, pt providing different answer when asked same question throughout session, pt unable to recall recent events  Objective   Bed mobility  Supine to Sit: Stand by assistance (HOB flat; increased time to complete)  Sit to Supine: Unable to assess (Pt left sitting up in chair)  Scooting: Stand by assistance (forward at EOB)  Transfers  Sit to Stand: Stand by assistance (from EOB to walker)  Stand to sit: Contact guard assistance (step by step cues for safe sequence while attempting to sit prematurely when turning to chair)  Ambulation  Ambulation?: Yes  More Ambulation?: No  Ambulation 1  Surface: level tile  Device: Rolling Walker  Assistance: Contact guard assistance  Quality of Gait: Alternating swing through pattern with inconsistent step length; unsteady without overt LOB  Gait Deviations: Decreased step length;Decreased step height  Distance: 12 ft  Comments: Unable to progress further d/t Pt reports of dizziness and desaturating. RN had placed Pt on 6L prior to ambulation w/ reading as low as 78% although questionable d/t very quick recovery to 84% and then 88% upon sitting. Pt fluctuating b/w 88-91% sitting in chair so further mobility held.  RN present and aware  Stairs/Curb  Stairs?: No     Balance  Sitting - Static: Good  Sitting - Dynamic: Good;-  Standing - Static: Fair;+ (SBA at )  Standing - Dynamic: Fair ([facility] [place] [facility]                                                                  AM-PAC Score  AM-PAC Inpatient Mobility Raw Score : 18 (09/25/21 1235)  AM-PAC Inpatient T-Scale

## 2021-09-25 NOTE — PROGRESS NOTES
Pt alert, drowsy. VSS, afebrile. Assessment completed as charted. Pt resting in bed, denies pain or needs at present time. Call light and bedside table within reach. Bed locked and in lowest position. Bed alarm on and audible.

## 2021-09-25 NOTE — PROGRESS NOTES
Hospitalist Progress Note      PCP: Everette Littlejohn    Date of Admission: 9/19/2021    Chief Complaint: C/O concerns for COVID and mild chest pain     Hospital Course:   66 y.o. female who presented to [facility] with feelings of sob, cough, fatigue. Malaise  and mild chest pain. PT is vaccinated and her family has + covid currently. She is concerned that she is covid + as well.      Pt found to have mild THEO, CXR demonstrated small left pna. VSS.  No hypoxia   ECG demonstrated New LBBB      Pt will be admitted for further evalaution and treatment     Subjective:   slow to answer the questions, no complaint of dizziness, syncope, palpitation, focal neurological weakness  Cough, shortness of breath on room air, comfortable on oxygen  No nausea or vomiting  Tired   Appetite is not great  Denies diarrhea or abdominal pain  No urinary complaints    Medications:  Reviewed    Infusion Medications    sodium bicarbonate infusion 75 mL/hr at 09/25/21 1349    sodium chloride      dextrose      sodium chloride       Scheduled Medications    sodium chloride flush  5-40 mL IntraVENous 2 times per day    azithromycin  500 mg Oral Daily    metoprolol succinate  25 mg Oral Daily    magnesium oxide  400 mg Oral BID    aspirin  324 mg Oral Once    insulin lispro  0-12 Units SubCUTAneous TID WC    insulin lispro  0-6 Units SubCUTAneous Nightly    sodium chloride flush  5-40 mL IntraVENous 2 times per day    enoxaparin  30 mg SubCUTAneous BID    famotidine  20 mg Oral BID    Vitamin D  2,000 Units Oral Daily    aspirin  81 mg Oral Daily    atorvastatin  40 mg Oral Nightly    melatonin  5 mg Oral Nightly     PRN Meds: sodium chloride, perflutren lipid microspheres, glucose, dextrose, glucagon (rDNA), dextrose, sodium chloride flush, ondansetron **OR** ondansetron, polyethylene glycol, guaiFENesin-dextromethorphan, sodium chloride, acetaminophen **OR** acetaminophen, cyclobenzaprine      Intake/Output Summary SPECGRAV 1.010 09/19/2021    GLUCOSEU >=1000 09/19/2021       Radiology:  XR CHEST 1 VIEW   Final Result   Progressive bilateral airspace disease, remaining consistent with pneumonia. Small right pleural effusion. CT CHEST ABDOMEN PELVIS WO CONTRAST   Final Result   1. Commonly reported imaging features of COVID-19 pneumonia are present. Other processes such as influenza pneumonia and organizing pneumonia, as can   be seen with drug toxicity and connective tissue disease, can cause a similar   imaging pattern. PneTyp   2. No acute findings in the abdomen and pelvis. XR CHEST PORTABLE   Final Result   Increasing airspace opacity left lower lobe         XR CHEST PORTABLE   Final Result   Small left basilar pneumonia. XR HIP RIGHT (2-3 VIEWS)   Final Result   No acute finding of the right hip. MRI BRAIN W WO CONTRAST    (Results Pending)           Assessment/Plan:    Active Hospital Problems    Diagnosis     Abnormal ECG [R94.31]     Aortic valve sclerosis [I35.8]     PNA (pneumonia) [J18.9]     Pneumonia [J18.9]     Coronary artery disease involving native coronary artery of native heart without angina pectoris [I25.10]     PVC (premature ventricular contraction) [I49.3]     Hypertension [I10]     Hyperlipidemia [E78.5]     Chest pain [R07.9]      67 yo female vaccinated presents with mild chest pain, fatigue, sob, general aches and pains pt concerned for COVID    COVID -19-ruled out rapid and PCR are negative     Ongoing fever and change in mental status  PNA-left base, -worsening with acute hypoxic respiratory failure and fever?  HAP-currently on oxygen, broaden the antibiotics Vanco and cefepime  Persistent fever   ID consulted -added Zithromax 9/23  DC'd Vanco and Maxipime  Given ongoing fever and confusion-MRI of the brain with and without contrast    Early sepsis-secondary to above    Asthma: Exacerbation-resolved s/p steroid     THEO: pre renal, low volume, poor appetite last few days and was taking BP meds  ACE. HCTZ combo, hold for now   Repeat BMP pending-improved     HTN: controlled: cont BB only for now      DM: reports controlled   - hold orals, SSI while IP, titrate as needed   - A1C pending      Mild chest pain- low suspicion of ACS, however does have CAD hx and new lBBB on ECG. Add asa and ask Cardiology to see for ischemic work up needs   - initial trop negtaiv, serial   Cardiology input appreciated and following  Patient was awaiting for cardiac cath on 24 September as an outpatient to evaluate for obstructive coronary artery, may wait till infection clears,    Mild thrombocytopenia-possibly secondary to sepsis -monitor     HLD- cont statin     A -a gap acidosis / hyperkalemia - \s/p hco3 infusion, [facility] nephrology input appreciated - resolved   As of low bicarb-sodium bicarb infusion is restarted      DVT Prophylaxis: Lovenox  Diet: ADULT DIET;  Regular; 4 carb choices (60 gm/meal)  Adult Oral Nutrition Supplement; Diabetic Oral Supplement  Code Status: Full Code    PT/OT Eval Status: Input appreciated,     Dispo -continue above treatment plan, wean o2 as tolerated      Kaushik Malone MD

## 2021-09-25 NOTE — PROGRESS NOTES
Message sent to hospitalist \"Pt sat at side of bed and O2 sat dropped to 87% on 2L, pt now 89-91% on 4L, crackles heard bilaterally\"

## 2021-09-26 ENCOUNTER — APPOINTMENT (OUTPATIENT)
Dept: GENERAL RADIOLOGY | Age: 71
DRG: 871 | End: 2021-09-26
Payer: MEDICARE

## 2021-09-26 PROBLEM — R91.8 PULMONARY INFILTRATES: Status: ACTIVE | Noted: 2021-09-26

## 2021-09-26 PROBLEM — R79.89 ELEVATED LFTS: Status: ACTIVE | Noted: 2021-09-26

## 2021-09-26 PROBLEM — I42.9 CARDIOMYOPATHY (HCC): Status: ACTIVE | Noted: 2021-09-26

## 2021-09-26 PROBLEM — R06.02 SOB (SHORTNESS OF BREATH): Status: ACTIVE | Noted: 2021-09-26

## 2021-09-26 PROBLEM — D69.6 THROMBOCYTOPENIA (HCC): Status: ACTIVE | Noted: 2021-09-26

## 2021-09-26 PROBLEM — I51.89 DIASTOLIC DYSFUNCTION: Status: ACTIVE | Noted: 2021-09-26

## 2021-09-26 PROBLEM — D64.9 NORMOCYTIC NORMOCHROMIC ANEMIA: Status: ACTIVE | Noted: 2021-09-26

## 2021-09-26 LAB
AMPHETAMINE SCREEN, URINE: NORMAL
ANION GAP SERPL CALCULATED.3IONS-SCNC: 6 MMOL/L (ref 3–16)
BARBITURATE SCREEN URINE: NORMAL
BASE EXCESS ARTERIAL: 12.5 MMOL/L (ref -3–3)
BENZODIAZEPINE SCREEN, URINE: NORMAL
BUN BLDV-MCNC: 11 MG/DL (ref 7–20)
CALCIUM SERPL-MCNC: 7.8 MG/DL (ref 8.3–10.6)
CANNABINOID SCREEN URINE: NORMAL
CARBOXYHEMOGLOBIN ARTERIAL: 0.3 % (ref 0–1.5)
CHLORIDE BLD-SCNC: 96 MMOL/L (ref 99–110)
CO2: 33 MMOL/L (ref 21–32)
COCAINE METABOLITE SCREEN URINE: NORMAL
CREAT SERPL-MCNC: 0.7 MG/DL (ref 0.6–1.2)
GFR AFRICAN AMERICAN: >60
GFR NON-AFRICAN AMERICAN: >60
GLUCOSE BLD-MCNC: 106 MG/DL (ref 70–99)
GLUCOSE BLD-MCNC: 121 MG/DL (ref 70–99)
GLUCOSE BLD-MCNC: 166 MG/DL (ref 70–99)
GLUCOSE BLD-MCNC: 186 MG/DL (ref 70–99)
GLUCOSE BLD-MCNC: 208 MG/DL (ref 70–99)
HCO3 ARTERIAL: 36.6 MMOL/L (ref 21–29)
HEMOGLOBIN, ART, EXTENDED: 10.1 G/DL (ref 12–16)
Lab: NORMAL
METHADONE SCREEN, URINE: NORMAL
METHEMOGLOBIN ARTERIAL: 0.3 %
O2 SAT, ARTERIAL: 93.2 %
O2 THERAPY: ABNORMAL
OPIATE SCREEN URINE: NORMAL
OXYCODONE URINE: NORMAL
PCO2 ARTERIAL: 45.2 MMHG (ref 35–45)
PERFORMED ON: ABNORMAL
PH ARTERIAL: 7.53 (ref 7.35–7.45)
PH UA: 7
PHENCYCLIDINE SCREEN URINE: NORMAL
PO2 ARTERIAL: 71.2 MMHG (ref 75–108)
POTASSIUM SERPL-SCNC: 3.7 MMOL/L (ref 3.5–5.1)
PROPOXYPHENE SCREEN: NORMAL
SODIUM BLD-SCNC: 135 MMOL/L (ref 136–145)
TCO2 ARTERIAL: 38 MMOL/L
TROPONIN: 0.02 NG/ML
TROPONIN: 0.02 NG/ML

## 2021-09-26 PROCEDURE — 99232 SBSQ HOSP IP/OBS MODERATE 35: CPT | Performed by: HOSPITALIST

## 2021-09-26 PROCEDURE — 99222 1ST HOSP IP/OBS MODERATE 55: CPT | Performed by: INTERNAL MEDICINE

## 2021-09-26 PROCEDURE — 1200000000 HC SEMI PRIVATE

## 2021-09-26 PROCEDURE — 36415 COLL VENOUS BLD VENIPUNCTURE: CPT

## 2021-09-26 PROCEDURE — 2700000000 HC OXYGEN THERAPY PER DAY

## 2021-09-26 PROCEDURE — 6370000000 HC RX 637 (ALT 250 FOR IP): Performed by: INTERNAL MEDICINE

## 2021-09-26 PROCEDURE — 84484 ASSAY OF TROPONIN QUANT: CPT

## 2021-09-26 PROCEDURE — 94640 AIRWAY INHALATION TREATMENT: CPT

## 2021-09-26 PROCEDURE — 2500000003 HC RX 250 WO HCPCS: Performed by: HOSPITALIST

## 2021-09-26 PROCEDURE — 94761 N-INVAS EAR/PLS OXIMETRY MLT: CPT

## 2021-09-26 PROCEDURE — 2580000003 HC RX 258: Performed by: HOSPITALIST

## 2021-09-26 PROCEDURE — 6370000000 HC RX 637 (ALT 250 FOR IP): Performed by: NURSE PRACTITIONER

## 2021-09-26 PROCEDURE — 71045 X-RAY EXAM CHEST 1 VIEW: CPT

## 2021-09-26 PROCEDURE — 2580000003 HC RX 258: Performed by: INTERNAL MEDICINE

## 2021-09-26 PROCEDURE — 80307 DRUG TEST PRSMV CHEM ANLYZR: CPT

## 2021-09-26 PROCEDURE — 80048 BASIC METABOLIC PNL TOTAL CA: CPT

## 2021-09-26 PROCEDURE — 36600 WITHDRAWAL OF ARTERIAL BLOOD: CPT

## 2021-09-26 PROCEDURE — 82803 BLOOD GASES ANY COMBINATION: CPT

## 2021-09-26 PROCEDURE — 6360000002 HC RX W HCPCS: Performed by: NURSE PRACTITIONER

## 2021-09-26 RX ORDER — TIZANIDINE 4 MG/1
4 TABLET ORAL EVERY 6 HOURS PRN
Status: DISCONTINUED | OUTPATIENT
Start: 2021-09-26 | End: 2021-09-26 | Stop reason: ALTCHOICE

## 2021-09-26 RX ORDER — ALBUTEROL SULFATE 90 UG/1
2 AEROSOL, METERED RESPIRATORY (INHALATION) EVERY 4 HOURS PRN
Status: DISCONTINUED | OUTPATIENT
Start: 2021-09-26 | End: 2021-09-28 | Stop reason: HOSPADM

## 2021-09-26 RX ORDER — METOPROLOL TARTRATE 50 MG/1
1 TABLET, FILM COATED ORAL 2 TIMES DAILY
Status: DISCONTINUED | OUTPATIENT
Start: 2021-09-26 | End: 2021-09-26

## 2021-09-26 RX ORDER — ALBUTEROL SULFATE 90 UG/1
2 AEROSOL, METERED RESPIRATORY (INHALATION) EVERY 4 HOURS PRN
Status: DISCONTINUED | OUTPATIENT
Start: 2021-09-26 | End: 2021-09-26

## 2021-09-26 RX ORDER — ATORVASTATIN CALCIUM 10 MG/1
20 TABLET, FILM COATED ORAL DAILY
Status: DISCONTINUED | OUTPATIENT
Start: 2021-09-26 | End: 2021-09-26

## 2021-09-26 RX ORDER — ALBUTEROL SULFATE 90 UG/1
2 AEROSOL, METERED RESPIRATORY (INHALATION)
Status: DISCONTINUED | OUTPATIENT
Start: 2021-09-26 | End: 2021-09-26

## 2021-09-26 RX ADMIN — METOPROLOL SUCCINATE 25 MG: 25 TABLET, EXTENDED RELEASE ORAL at 08:13

## 2021-09-26 RX ADMIN — MAGNESIUM GLUCONATE 500 MG ORAL TABLET 400 MG: 500 TABLET ORAL at 08:13

## 2021-09-26 RX ADMIN — INSULIN LISPRO 2 UNITS: 100 INJECTION, SOLUTION INTRAVENOUS; SUBCUTANEOUS at 10:35

## 2021-09-26 RX ADMIN — FAMOTIDINE 20 MG: 20 TABLET ORAL at 08:13

## 2021-09-26 RX ADMIN — ASPIRIN 81 MG: 81 TABLET, CHEWABLE ORAL at 08:13

## 2021-09-26 RX ADMIN — GABAPENTIN 500 MG: 400 CAPSULE ORAL at 21:16

## 2021-09-26 RX ADMIN — AZITHROMYCIN DIHYDRATE 500 MG: 250 TABLET, FILM COATED ORAL at 08:12

## 2021-09-26 RX ADMIN — FAMOTIDINE 20 MG: 20 TABLET ORAL at 21:16

## 2021-09-26 RX ADMIN — Medication 2 PUFF: at 15:26

## 2021-09-26 RX ADMIN — ACETAMINOPHEN 650 MG: 325 TABLET ORAL at 00:53

## 2021-09-26 RX ADMIN — INSULIN LISPRO 2 UNITS: 100 INJECTION, SOLUTION INTRAVENOUS; SUBCUTANEOUS at 13:29

## 2021-09-26 RX ADMIN — ATORVASTATIN CALCIUM 40 MG: 10 TABLET, FILM COATED ORAL at 21:16

## 2021-09-26 RX ADMIN — Medication 2000 UNITS: at 08:13

## 2021-09-26 RX ADMIN — ONDANSETRON 4 MG: 2 INJECTION INTRAMUSCULAR; INTRAVENOUS at 16:52

## 2021-09-26 RX ADMIN — Medication 10 ML: at 21:17

## 2021-09-26 RX ADMIN — SODIUM BICARBONATE: 84 INJECTION, SOLUTION INTRAVENOUS at 04:08

## 2021-09-26 RX ADMIN — Medication 2 PUFF: at 08:47

## 2021-09-26 RX ADMIN — MAGNESIUM GLUCONATE 500 MG ORAL TABLET 400 MG: 500 TABLET ORAL at 21:16

## 2021-09-26 RX ADMIN — ONDANSETRON 4 MG: 2 INJECTION INTRAMUSCULAR; INTRAVENOUS at 07:10

## 2021-09-26 ASSESSMENT — PAIN SCALES - GENERAL: PAINLEVEL_OUTOF10: 10

## 2021-09-26 NOTE — PROGRESS NOTES
Pt alert and oriented. VSS, O2 92% on 3L NC. Assessment completed as charted. Bilateral crackles noted. Pt still drowsy, though improved from yesterday. Resting in bed, denies any needs at present time. Call light and bedside table within reach. Bed locked and in lowest position. Bed alarm on and audible. Avasys in place for safety.

## 2021-09-26 NOTE — PROGRESS NOTES
Pt called writer to room stating she was \"withdrawing and was going to have a heart attack if doesn't get something soon\"     Pt states head pounding, shaky and heart is racing. VSS. Tylenol given per [facility] for headache. Pt states \"was prescribed Vicodin for chronic pain and had ran out. Had a friend that gave her suboxone so she wouldn't withdrawal.\" Pt states \"had been taking small pieces off of suboxone and took the last piece this morning. \" Tox screen negative 9/24. Pt states has not taken anything this admission until today and now doesn't have anything left. States \"someone stole it\"    Avasys in place. NP made aware.

## 2021-09-26 NOTE — PROGRESS NOTES
Nephrology Progress  Note                                                                                                                                                                                                                                                                                                                                                               Office : [phone]     Fax :[phone]              Patient's Name: Marlin Mathews  2:23 PM  9/26/2021    Reason for Consult:  Metabolic acidosis , hyperkalemia   Requesting Physician:  Santos Salazar      Chief Complaint:  Chest pain       Interval History :    C XR s/o PNA , small pleural effusion    On 3 - 5  L O2   Neutropenia : improved   BP soft    No diarrhea  No sob  No chest pain  No fever          History of Present Ilness:    Marlin Mathews is a 66 y.o. female with PMH of CAD  who presented to [facility] with feelings of sob, cough, fatigue. Malaise  and mild chest pain.       Pt found to have mild THEO, CXR demonstrated small left pna. VSS.  No hypoxia   ECG demonstrated New LBBB       Nephrology consult for management of hyperkalemia and metabolic acidosis       Past Medical History:   Diagnosis Date    Arthritis     Asthma     Diabetes mellitus ([address]     type 2, takes PO meds    History of palpitations     Hyperlipidemia     Hypertension        Past Surgical History:   Procedure Laterality Date    CATARACT REMOVAL WITH IMPLANT Right 10/18/2017    entered to epic from h&P    CATARACT REMOVAL WITH IMPLANT Left 11/08/2017    CHOLECYSTECTOMY      COLONOSCOPY      CORONARY ANGIOPLASTY  10/30/14    CYST REMOVAL      from right wrist     ENDOSCOPY, COLON, DIAGNOSTIC      GASTRIC BYPASS SURGERY  2005    HERNIA REPAIR      HYSTERECTOMY      age 30    OTHER SURGICAL HISTORY  CYSTOSCOPY, RIGHT URETERAL STONE MANIPULATION WITH RIGHT    SHOULDER ARTHROPLASTY Left 12/15/2016    LEFT PROXIMAL HUMERUS OPEN REDUCTION INTERNAL FIXATION     TONSILLECTOMY         Family History   Problem Relation Age of Onset    Heart Attack Maternal Grandmother         reports that she has never smoked. She has never used smokeless tobacco. She reports that she does not drink alcohol and does not use drugs.     Allergies:  Morphine, Sulfamethoxazole, Trimethoprim, Alendronate, Bactrim [sulfamethoxazole-trimethoprim], Buspirone, Calcitonin (salmon), Demerol hcl [meperidine], Dust mite extract, Esomeprazole magnesium, Glipizide, Metformin, Mometasone, Omeprazole, Other, Oxycodone, Oxycodone-acetaminophen, Pcn [penicillins], Percocet [oxycodone-acetaminophen], Prednisone, Propoxyphene, Ranitidine, Sulfa antibiotics, Toradol [ketorolac tromethamine], Tramadol, and Zantac [ranitidine hcl]    Current Medications:    albuterol sulfate  (90 Base) MCG/ACT inhaler 2 puff, Q4H WA  sodium chloride flush 0.9 % injection 5-40 mL, 2 times per day  0.9 % sodium chloride infusion, PRN  azithromycin (ZITHROMAX) tablet 500 mg, Daily  metoprolol succinate (TOPROL XL) extended release tablet 25 mg, Daily  perflutren lipid microspheres (DEFINITY) injection 1.65 mg, ONCE PRN  magnesium oxide (MAG-OX) tablet 400 mg, BID  aspirin chewable tablet 324 mg, Once  glucose (GLUTOSE) 40 % oral gel 15 g, PRN  dextrose 50 % IV solution, PRN  glucagon (rDNA) injection 1 mg, PRN  dextrose 5 % solution, PRN  insulin lispro (HUMALOG) injection vial 0-12 Units, TID WC  insulin lispro (HUMALOG) injection vial 0-6 Units, Nightly  sodium chloride flush 0.9 % injection 5-40 mL, 2 times per day  sodium chloride flush 0.9 % injection 5-40 mL, PRN  enoxaparin (LOVENOX) injection 30 mg, BID  ondansetron (ZOFRAN-ODT) disintegrating tablet 4 mg, Q8H PRN   Or  ondansetron (ZOFRAN) injection 4 mg, Q6H PRN  polyethylene glycol (GLYCOLAX) packet 17 g, Daily PRN  famotidine (PEPCID) tablet 20 mg, BID  guaiFENesin-dextromethorphan (ROBITUSSIN DM) 100-10 MG/5ML syrup 5 mL, Q4H PRN  Vitamin D (CHOLECALCIFEROL) tablet 2,000 Units, Daily  0.9 % sodium chloride infusion, PRN  acetaminophen (TYLENOL) tablet 650 mg, Q6H PRN   Or  acetaminophen (TYLENOL) suppository 650 mg, Q6H PRN  aspirin chewable tablet 81 mg, Daily  atorvastatin (LIPITOR) tablet 40 mg, Nightly  melatonin disintegrating tablet 5 mg, Nightly  cyclobenzaprine (FLEXERIL) tablet 10 mg, TID PRN        Review of Systems:   14 point ROS obtained but were negative except mentioned in HPI      Physical exam:     Vitals:  /64   Pulse 59   Temp 97.2 °F (36.2 °C) (Oral)   Resp 18   Ht 5' 3\" (1.6 m)   Wt 165 lb (74.8 kg)   SpO2 93%   BMI 29.23 kg/m²   Constitutional:  OAA X3 NAD  Skin: no rash, turgor wnl  Heent:  eomi, mmm  Neck: no bruits or jvd noted  Cardiovascular:  S1, S2 without m/r/g  Respiratory: CTA B without w/r/r  Abdomen:  +bs, soft, nt, nd  Ext: no  lower extremity edema  Psychiatric: mood and affect appropriate  Musculoskeletal:  Rom, muscular strength intact    Data:   Labs:  CBC:   Recent Labs     09/24/21  0615 09/25/21  0617   WBC 5.5 7.3   HGB 12.0 11.5*   * 125*     BMP:    Recent Labs     09/24/21  0615 09/25/21  0617 09/26/21  0846   * 134* 135*   K 4.3 4.1 3.7    101 96*   CO2 20* 20* 33*   BUN 19 13 11   CREATININE 0.9 0.7 0.7   GLUCOSE 112* 118* 208*     Ca/Mg/Phos:   Recent Labs     09/24/21  0615 09/25/21  0617 09/26/21  0846   CALCIUM 8.1* 8.0* 7.8*     Hepatic:   No results for input(s): AST, ALT, ALB, BILITOT, ALKPHOS in the last 72 hours. Troponin:   Recent Labs     09/26/21  1136   TROPONINI 0.02*     BNP: No results for input(s): BNP in the last 72 hours. Lipids: No results for input(s): CHOL, TRIG, HDL, LDLCALC, LABVLDL in the last 72 hours. ABGs:   Recent Labs     09/26/21  1308   PHART 7.526*   PO2ART 71.2*   [id number] 45.2*     INR: No results for input(s): INR in the last 72 hours.   UA:  Recent Labs     09/26/21  0405   PHUR 7.0      Urine Microscopic:   No results for input(s): LABCAST, BACTERIA, COMU, HYALCAST, WBCUA, RBCUA, EPIU in the last 72 hours. Urine Culture:   No results for input(s): LABURIN in the last 72 hours. Urine Chemistry: No results for input(s): Rudy Miracle, PROTEINUR, NAUR in the last 72 hours. IMAGING:  XR CHEST 1 VIEW   Final Result   Persistent bilateral airspace disease, suspicious for pneumonia, decreased on   the left, but increased on the right. XR CHEST 1 VIEW   Final Result   Progressive bilateral airspace disease, remaining consistent with pneumonia. Small right pleural effusion. CT CHEST ABDOMEN PELVIS WO CONTRAST   Final Result   1. Commonly reported imaging features of COVID-19 pneumonia are present. Other processes such as influenza pneumonia and organizing pneumonia, as can   be seen with drug toxicity and connective tissue disease, can cause a similar   imaging pattern. PneTyp   2. No acute findings in the abdomen and pelvis. XR CHEST PORTABLE   Final Result   Increasing airspace opacity left lower lobe         XR CHEST PORTABLE   Final Result   Small left basilar pneumonia. XR HIP RIGHT (2-3 VIEWS)   Final Result   No acute finding of the right hip. MRI BRAIN W WO CONTRAST    (Results Pending)       Assessment/Plan     1. AGMA    Anion Gap :   15   -----> 17  ----> 14    Serum Bicarb : 19   ------> 15 -----> 18 ----> 20    Denies diarrhea   Denies vomiting    S/p IVF  NS  On 9/19 , 9/20     Plan      DC IVF     Encourage PO intake of fluids to thirst    Avoid volume overload    Lasix prn for volume overload    Hold BP medicine                   2. Hyperkalemia in setting of metabolic acidosis     F/u BMP      S/p lokelma     Serum K   5.1 ----> 5.7 ------> 4.9     Avoid ACEI or ARB    Low K diet     3 THEO    Serum cr improved from 1.4 to 1.2   ---> 0.7    Avoid contrast    Avoid ACEI OR ARB      3. CAD    4.  Aortic sclerosis     5 HTN    6 DLP                     Thank you for allowing us to participate in care of Mika Willis MD  Feel free to contact me   [facility] [id number]  Office : [phone]  Fax :[phone]

## 2021-09-26 NOTE — PROGRESS NOTES
Hospitalist Progress Note      PCP: Salvador Anderson    Date of Admission: 9/19/2021    Chief Complaint: C/O concerns for COVID and mild chest pain     Hospital Course:   66 y.o. female who presented to [facility] with feelings of sob, cough, fatigue. Malaise  and mild chest pain. PT is vaccinated and her family has + covid currently. She is concerned that she is covid + as well.      Pt found to have mild THEO, CXR demonstrated small left pna. VSS.  No hypoxia   ECG demonstrated New LBBB      Pt will be admitted for further evalaution and treatment     Subjective:   Hypoxia early morning, no fevers/chills, no complaint of dizziness, syncope, palpitation, focal neurological weakness  Cough, shortness of breath on room air, comfortable on oxygen  No nausea or vomiting  Tired   Appetite is not great  Denies diarrhea or abdominal pain  No urinary complaints    Medications:  Reviewed    Infusion Medications    sodium chloride      dextrose      sodium chloride       Scheduled Medications    albuterol sulfate HFA  2 puff Inhalation Q4H WA    gabapentin  500 mg Oral Nightly    magnesium oxide  400 mg Oral BID    sodium chloride flush  5-40 mL IntraVENous 2 times per day    azithromycin  500 mg Oral Daily    metoprolol succinate  25 mg Oral Daily    aspirin  324 mg Oral Once    insulin lispro  0-12 Units SubCUTAneous TID WC    insulin lispro  0-6 Units SubCUTAneous Nightly    sodium chloride flush  5-40 mL IntraVENous 2 times per day    enoxaparin  30 mg SubCUTAneous BID    famotidine  20 mg Oral BID    Vitamin D  2,000 Units Oral Daily    aspirin  81 mg Oral Daily    atorvastatin  40 mg Oral Nightly    melatonin  5 mg Oral Nightly     PRN Meds: tiZANidine, sodium chloride, perflutren lipid microspheres, glucose, dextrose, glucagon (rDNA), dextrose, sodium chloride flush, ondansetron **OR** ondansetron, polyethylene glycol, guaiFENesin-dextromethorphan, sodium chloride, acetaminophen **OR** acetaminophen, cyclobenzaprine      Intake/Output Summary (Last 24 hours) at 9/26/2021 1559  Last data filed at 9/26/2021 0408  Gross per 24 hour   Intake 1645 ml   Output 350 ml   Net 1295 ml       Physical Exam Performed:    /63   Pulse 57   Temp 97.3 °F (36.3 °C) (Oral)   Resp 16   Ht 5' 3\" (1.6 m)   Wt 165 lb (74.8 kg)   SpO2 91%   BMI 29.23 kg/m²     General appearance: NAD  HEENT: . Conjunctivae/corneas clear. Neck: Supple, with full range of motion. No jugular venous distention. Trachea midline. Respiratory:  Normal respiratory effort. Reduced air entry, bilaterally without Rales/Wheezes/Rhonchi. Cardiovascular: Regular rate and rhythm with normal S1/S2 without murmurs, rubs or gallops. Abdomen: Soft, non-tender, non-distended with normal bowel sounds. Musculoskeletal: No clubbing, cyanosis or edema bilaterally. Full range of motion without deformity. Skin: Skin color, texture, turgor normal.  No rashes or lesions. Neurologic:  Neurovascularly intact without any focal sensory/motor deficits. Mental status altered-slow to respond and difficult to comprehend  Psychiatric: Awake, confused/forgetful/slow to response,   Capillary Refill: Brisk,3 seconds, normal   Peripheral Pulses: +2 palpable, equal bilaterally       Labs:   Recent Labs     09/24/21  0615 09/25/21  0617   WBC 5.5 7.3   HGB 12.0 11.5*   HCT 37.6 35.9*   * 125*     Recent Labs     09/24/21  0615 09/25/21  0617 09/26/21  0846   * 134* 135*   K 4.3 4.1 3.7    101 96*   CO2 20* 20* 33*   BUN 19 13 11   CREATININE 0.9 0.7 0.7   CALCIUM 8.1* 8.0* 7.8*     No results for input(s): AST, ALT, BILIDIR, BILITOT, ALKPHOS in the last 72 hours. No results for input(s): INR in the last 72 hours.   Recent Labs     09/26/21  1136   TROPONINI 0.02*       Urinalysis:      Lab Results   Component Value Date    NITRU Negative 09/19/2021    WBCUA 0-2 09/19/2021    RBCUA 0-2 09/19/2021    BLOODU TRACE-INTACT 09/19/2021 SPECGRAV 1.010 09/19/2021    GLUCOSEU >=1000 09/19/2021       Radiology:  XR CHEST 1 VIEW   Final Result   Persistent bilateral airspace disease, suspicious for pneumonia, decreased on   the left, but increased on the right. XR CHEST 1 VIEW   Final Result   Progressive bilateral airspace disease, remaining consistent with pneumonia. Small right pleural effusion. CT CHEST ABDOMEN PELVIS WO CONTRAST   Final Result   1. Commonly reported imaging features of COVID-19 pneumonia are present. Other processes such as influenza pneumonia and organizing pneumonia, as can   be seen with drug toxicity and connective tissue disease, can cause a similar   imaging pattern. PneTyp   2. No acute findings in the abdomen and pelvis. XR CHEST PORTABLE   Final Result   Increasing airspace opacity left lower lobe         XR CHEST PORTABLE   Final Result   Small left basilar pneumonia. XR HIP RIGHT (2-3 VIEWS)   Final Result   No acute finding of the right hip. MRI BRAIN W WO CONTRAST    (Results Pending)           Assessment/Plan:    Active Hospital Problems    Diagnosis     Abnormal ECG [R94.31]     Aortic valve sclerosis [I35.8]     PNA (pneumonia) [J18.9]     Pneumonia [J18.9]     Coronary artery disease involving native coronary artery of native heart without angina pectoris [I25.10]     PVC (premature ventricular contraction) [I49.3]     Hypertension [I10]     Hyperlipidemia [E78.5]     Chest pain [R07.9]      67 yo female vaccinated presents with mild chest pain, fatigue, sob, general aches and pains pt concerned for COVID    COVID -19-ruled out rapid and PCR are negative     Ongoing fever and change in mental status  PNA-left base, -worsening with acute hypoxic respiratory failure and fever?  HAP-currently on oxygen, broaden the antibiotics Vanco and cefepime  Persistent fever   ID consulted -added Zithromax 9/23  DC'd Vanco and Maxipime  Given ongoing fever and confusion-MRI of the brain with and without contrast    Early sepsis-secondary to above    Asthma: Exacerbation-resolved s/p steroid     THEO: pre renal, low volume, poor appetite last few days and was taking BP meds  ACE. HCTZ combo, hold for now   Repeat BMP pending-improved     HTN: controlled: cont BB only for now      DM: reports controlled   - hold orals, SSI while IP, titrate as needed   - A1C pending      Mild chest pain- low suspicion of ACS, however does have CAD hx and new lBBB on ECG. Add asa and ask Cardiology to see for ischemic work up needs   - initial trop negtaiv, serial   Cardiology input appreciated and following  Patient was awaiting for cardiac cath on 24 September as an outpatient to evaluate for obstructive coronary artery, may wait till infection clears,    Mild thrombocytopenia-possibly secondary to sepsis -monitor     HLD- cont statin     A -a gap acidosis / hyperkalemia - \s/p hco3 infusion, [facility] nephrology input appreciated - resolved   As of low bicarb-sodium bicarb infusion is restarted      DVT Prophylaxis: Lovenox  Diet: ADULT DIET;  Regular; 4 carb choices (60 gm/meal)  Adult Oral Nutrition Supplement; Diabetic Oral Supplement  Code Status: Full Code    PT/OT Eval Status: Input appreciated,     Dispo - continue above treatment plan, wean o2 as tolerated, continue IV abx today, 1-2 days      Mike Ballard MD

## 2021-09-26 NOTE — PROGRESS NOTES
Physical Therapy  Attempted to see pt at this time for PT f/u. RN cleared pt for session at bed level or SPT d/t drop in SpO2 earlier this date. Pt declines participation in supine TE or OOB mobility d/t fatigue and lab just leaving. Pt requesting to rest at this time. Pt educated on benefit of mobility and TE as tolerated to prevent decline in status. Pt agreeable to PT to re-attempt at later date. Will re-attempt as schedule permits. Thanks.   Levi Santacruz PT, DPT

## 2021-09-26 NOTE — PROGRESS NOTES
09/26/21 1305   Oxygen Therapy/Pulse Ox   O2 Therapy Oxygen   $Oxygen $Daily Charge   O2 Device Nasal cannula   Blood Gas  Performed? Yes   $ABG $ABG   Dakota's Test #1 Pos   Site #1 Left Radial   Site Prepped #1 Yes   Number of Attempts #1 1   Pressure Held #1 Yes   Complications #1 None   Post-procedure #1 Standard   Specimen Status #1 To lab   How Tolerated?  Tolerated well

## 2021-09-26 NOTE — PROGRESS NOTES
Pt alert and oriented, VSS, O2 sat 94% on 2L. Pt is asking for albuterol for SOB, message sent to hospitalist. Shift assessment completed and documented. Bed locked and in lowest position. Call light within reach. Avasys in place.

## 2021-09-26 NOTE — PROGRESS NOTES
Message sent to hospitalist \"Night shift nurse said pt called her to room stating she was \"withdrawing and was going to have a heart attack if doesn't get something soon\". Pt told night shift nurse her head was pounding, shaky and heart is racing, vital signs were stable. Pt told nightshift nurse she was prescribed Vicodin for chronic pain and had ran out. Had a friend that gave her suboxone so she wouldn't withdrawal. Pt states \"had been taking small pieces off of suboxone and took the last yesterday morning. Tox screen  was negative. Pts purse was put in her closet. Pt told me she was going to leave AMA if I did not give her purse to her. I told her the only way I would give it to her is if I put all her medications in her lockbox and went through her purse. Pt is a+ox4 but seems confused and keeps changing her story. She told me she hasn't taken suboxone since the day she was admitted. She states she was prescribed Vicodin by pain doctor. She states she is having pain in her back and hips and that is why she told nightshift nurse about taking suboxone so she could \"get some help with her pain\". Also, pt took her oxygen off multiple times yesterday and this morning her oxygen tubing wasn't connected when I went into her room, O2 sat was 78% on RA, do you want to order ABG? \"

## 2021-09-27 PROBLEM — J96.01 ACUTE RESPIRATORY FAILURE WITH HYPOXIA (HCC): Status: ACTIVE | Noted: 2021-09-27

## 2021-09-27 LAB
GLUCOSE BLD-MCNC: 112 MG/DL (ref 70–99)
GLUCOSE BLD-MCNC: 127 MG/DL (ref 70–99)
GLUCOSE BLD-MCNC: 161 MG/DL (ref 70–99)
GLUCOSE BLD-MCNC: 167 MG/DL (ref 70–99)
PERFORMED ON: ABNORMAL

## 2021-09-27 PROCEDURE — 2700000000 HC OXYGEN THERAPY PER DAY

## 2021-09-27 PROCEDURE — 99232 SBSQ HOSP IP/OBS MODERATE 35: CPT | Performed by: INTERNAL MEDICINE

## 2021-09-27 PROCEDURE — 6370000000 HC RX 637 (ALT 250 FOR IP): Performed by: NURSE PRACTITIONER

## 2021-09-27 PROCEDURE — 2580000003 HC RX 258: Performed by: NURSE PRACTITIONER

## 2021-09-27 PROCEDURE — 99233 SBSQ HOSP IP/OBS HIGH 50: CPT | Performed by: NURSE PRACTITIONER

## 2021-09-27 PROCEDURE — 99232 SBSQ HOSP IP/OBS MODERATE 35: CPT | Performed by: HOSPITALIST

## 2021-09-27 PROCEDURE — 94761 N-INVAS EAR/PLS OXIMETRY MLT: CPT

## 2021-09-27 PROCEDURE — 97116 GAIT TRAINING THERAPY: CPT

## 2021-09-27 PROCEDURE — 1200000000 HC SEMI PRIVATE

## 2021-09-27 PROCEDURE — 97530 THERAPEUTIC ACTIVITIES: CPT

## 2021-09-27 PROCEDURE — 6370000000 HC RX 637 (ALT 250 FOR IP): Performed by: INTERNAL MEDICINE

## 2021-09-27 PROCEDURE — 6360000002 HC RX W HCPCS: Performed by: HOSPITALIST

## 2021-09-27 RX ORDER — METOPROLOL SUCCINATE 50 MG/1
50 TABLET, EXTENDED RELEASE ORAL DAILY
Status: DISCONTINUED | OUTPATIENT
Start: 2021-09-28 | End: 2021-09-28 | Stop reason: HOSPADM

## 2021-09-27 RX ORDER — POTASSIUM CHLORIDE 20 MEQ/1
20 TABLET, EXTENDED RELEASE ORAL ONCE
Status: COMPLETED | OUTPATIENT
Start: 2021-09-27 | End: 2021-09-27

## 2021-09-27 RX ORDER — FUROSEMIDE 10 MG/ML
40 INJECTION INTRAMUSCULAR; INTRAVENOUS ONCE
Status: COMPLETED | OUTPATIENT
Start: 2021-09-27 | End: 2021-09-27

## 2021-09-27 RX ADMIN — METOPROLOL SUCCINATE 25 MG: 25 TABLET, EXTENDED RELEASE ORAL at 09:59

## 2021-09-27 RX ADMIN — ASPIRIN 81 MG: 81 TABLET, CHEWABLE ORAL at 09:59

## 2021-09-27 RX ADMIN — Medication 2000 UNITS: at 09:59

## 2021-09-27 RX ADMIN — MAGNESIUM GLUCONATE 500 MG ORAL TABLET 400 MG: 500 TABLET ORAL at 09:59

## 2021-09-27 RX ADMIN — INSULIN LISPRO 2 UNITS: 100 INJECTION, SOLUTION INTRAVENOUS; SUBCUTANEOUS at 10:00

## 2021-09-27 RX ADMIN — SODIUM CHLORIDE, PRESERVATIVE FREE 10 ML: 5 INJECTION INTRAVENOUS at 21:26

## 2021-09-27 RX ADMIN — ATORVASTATIN CALCIUM 40 MG: 10 TABLET, FILM COATED ORAL at 21:24

## 2021-09-27 RX ADMIN — FUROSEMIDE 40 MG: 10 INJECTION, SOLUTION INTRAMUSCULAR; INTRAVENOUS at 12:17

## 2021-09-27 RX ADMIN — MAGNESIUM GLUCONATE 500 MG ORAL TABLET 400 MG: 500 TABLET ORAL at 21:24

## 2021-09-27 RX ADMIN — GABAPENTIN 500 MG: 400 CAPSULE ORAL at 21:24

## 2021-09-27 RX ADMIN — POTASSIUM CHLORIDE 20 MEQ: 20 TABLET, EXTENDED RELEASE ORAL at 17:17

## 2021-09-27 RX ADMIN — INSULIN LISPRO 2 UNITS: 100 INJECTION, SOLUTION INTRAVENOUS; SUBCUTANEOUS at 17:16

## 2021-09-27 RX ADMIN — AZITHROMYCIN DIHYDRATE 500 MG: 250 TABLET, FILM COATED ORAL at 09:59

## 2021-09-27 RX ADMIN — FAMOTIDINE 20 MG: 20 TABLET ORAL at 10:00

## 2021-09-27 RX ADMIN — FAMOTIDINE 20 MG: 20 TABLET ORAL at 21:24

## 2021-09-27 NOTE — PROGRESS NOTES
[facility]   Daily Progress Note    Admit Date:  9/19/2021  HPI:    Chief Complaint   Patient presents with    Shortness of Breath     patient c/o shortness of breath when she woke up this morning. states her family is COVID positive, patient has not been tested.  Hip Pain     multiple falls over the past three days. another fall this morning, right hip pain. 100mcg Fentanyl given by EMS in route. Sarah Calhoun admitted 9/19/2021 for shortness of breath. Cardiology consulted for new LBBB. Echo showed EF 30-35% with anterior wall motion abnormalities. She has also been treated for metabolic acidosis, pneumonia, THEO. Subjective:  Ms. Watkins Lacks in chair, daughter at bedside. Continues to have low oxygen saturations despite O2. Denies any shortness of breath but has not been up out of bed or chair. Still with cough. Denies any chest pain or palpitations. States is going home today.      She reports having seen Dr. Chelsie Juarez recently and has plans to have cardiac cath due to abnormal stress test.        Objective:   Patient Vitals for the past 24 hrs:   BP Temp Temp src Pulse Resp SpO2   09/27/21 0945 118/71 97.8 °F (36.6 °C) Oral 81 16 91 %   09/27/21 0541 -- -- -- -- -- 90 %   09/27/21 0540 -- -- -- -- -- 91 %   09/27/21 0520 -- -- -- -- -- 92 %   09/27/21 0515 120/69 97.7 °F (36.5 °C) -- 72 -- (!) 89 %   09/27/21 0514 -- -- Oral -- 16 --   09/26/21 2227 128/77 97.8 °F (36.6 °C) Oral 63 18 90 %   09/26/21 1858 128/68 97.7 °F (36.5 °C) Oral 65 17 92 %   09/26/21 1423 114/63 97.3 °F (36.3 °C) Oral 57 16 91 %   09/26/21 1412 -- -- -- -- -- 93 %   09/26/21 1411 -- -- -- 59 -- 95 %   09/26/21 1242 114/64 97.2 °F (36.2 °C) Oral 62 18 92 %   09/26/21 1038 -- -- -- 58 -- 93 %       Intake/Output Summary (Last 24 hours) at 9/27/2021 1001  Last data filed at 9/27/2021 0914  Gross per 24 hour   Intake 240 ml   Output 750 ml   Net -510 ml     Wt Readings from Last 3 Encounters:   09/26/21 165 lb (74.8 kg)   06/17/19 120 lb 8 oz (54.7 kg)   04/19/18 130 lb (59 kg)         ASSESSMENT:   1. LBBB (new):    2. Cardiomyopathy: EF 30-35% by echo, + wall motion abnormalities  3. CAD: Mild by [facility] 2014  4. Pneumonia  5. Metabolic acidosis  6. THEO  7. HYPERTENSION  8. Diabetes mellitus      PLAN:  1. + rales, + 4 L positive - agree with IV Lasix 40 mg  2. Monitor weights, labs and oxygenation  3. Was on metoprolol tartrate and lisinopril/ hydrochlorothiazide at home - changed to Toprol XL 25 mg for LV dysfunction  4. Repeat labs in AM, if renal panel stable after diuresis, consider resuming lisinopril alone  5. Increase Toprol to 50 mg daily  6. Needs cardiac catheterization for evaluation of coronary artery disease with likely progression - patient wishes to follow up with primary cardiologist  7. Continue aspirin and statin for CAD  8.  Favor her staying today for further treatment and monitoring as above    Reviewed with Dr. Jyoti George and nursing      Benedict Perea, JENN - CNP, 9/27/2021, 10:01 AM  [facility]   [phone]       Telemetry: N/A  NYHA: III    Physical Exam:  General:  Awake, alert, NAD  Skin:  Warm and dry  Neck:  JVP 8 cm upright  Chest:  Scattered rales bilaterally posteriorly  Cardiovascular:  RRR, normal S1S2, + murmur, no g/r  Abdomen:  Soft, nontender, +bowel sounds  Extremities:  No BLE edema      Medications:    gabapentin  500 mg Oral Nightly    magnesium oxide  400 mg Oral BID    sodium chloride flush  5-40 mL IntraVENous 2 times per day    azithromycin  500 mg Oral Daily    metoprolol succinate  25 mg Oral Daily    aspirin  324 mg Oral Once    insulin lispro  0-12 Units SubCUTAneous TID WC    insulin lispro  0-6 Units SubCUTAneous Nightly    sodium chloride flush  5-40 mL IntraVENous 2 times per day    enoxaparin  30 mg SubCUTAneous BID    famotidine  20 mg Oral BID    Vitamin D  2,000 Units Oral Daily    aspirin  81 mg Oral Daily    atorvastatin  40 mg Oral Nightly    melatonin  5 mg Oral Nightly      sodium chloride      dextrose      sodium chloride         Lab Data: Lab results independently reviewed and analyzed by myself 9/27/21   CBC:   Recent Labs     09/25/21  0617   WBC 7.3   HGB 11.5*   *     BMP:    Recent Labs     09/25/21  0617 09/26/21  0846   * 135*   K 4.1 3.7   CO2 20* 33*   BUN 13 11   CREATININE 0.7 0.7     INR:  No results for input(s): INR in the last 72 hours. BNP:  No results for input(s): PROBNP in the last 72 hours. Cardiac Enzymes:   Recent Labs     09/26/21  1136 09/26/21  1739   TROPONINI 0.02* 0.02*     Lipids:   Lab Results   Component Value Date    TRIG 113 12/11/2015    TRIG 84 10/30/2014    HDL 57 12/11/2015    HDL 70 10/30/2014    LDLCALC 59 12/11/2015    LDLCALC 77 10/30/2014       Cardiac Imaging:    Echo 9/22/2021:  The left ventricular systolic function is moderately reduced with an   ejection fraction of 30-35%. There is hypokinesis of the apex, apical lateral, apical septum, anterior,   anteroseptum and apical anterior walls. Grade I diastolic dysfunction with normal filling pressure. Changes noted from previous echo on 6- in left ventricular function. Mild mitral regurgitation. Echo 2018:  Normal left ventricle systolic function with an estimated ejection fraction   of 55%. No regional wall motion abnormalities are seen. Grade I diastolic dysfunction with normal filing pressure. The non-coronary cusp of the aortic valve is thickened/calcified with   decreased leaflet mobility. No aortic stenosis noted. Mild pulmonic regurgitation. Mild tricuspid regurgitation. Systolic pulmonary artery pressure (SPAP) is normal and estimated at 24 mmHg   (right atrial pressure 3 mmHg). Coronary angiogram 10/2014:  LM, LAD, LCX, RCA with no significant CAD (RCA with <10%)  Significant kinking and tortousity of vessel  LVEDP 5  LVEF 65%  PLAN  1. No significant CAD (only <10% in prox RCA)  2.

## 2021-09-27 NOTE — PROGRESS NOTES
Physical Therapy  Facility/Department: [facility] C3 TELE/MED SURG/ONC  Daily Treatment Note  NAME: Mago Kulkarni  : 1950  MRN: [id number]    Date of Service: 2021    Discharge Recommendations:  24 hour supervision or assist, Home with Home health PT   PT Equipment Recommendations  Equipment Needed: Yes  Mobility Devices: Vannie Robes: Rolling    Assessment   Body structures, Functions, Activity limitations: Decreased functional mobility ; Decreased balance;Decreased endurance;Decreased safe awareness;Decreased posture  Assessment: PT tx session focused on functional transfers, gait training and overall activity tolerance. Pt able to progress to ambulating 25ft without device with CGA-min A due to unsteadiness, performed ADLs in seated and standing without rest breaks on 4L with SpO2 90% upon return to bed. Pt demonstrated good understanding of safety with O2 line this date. Pt will benefit from skilled PT while admitted to maximize functional independence and safety. Anticipate pt will be safe to return home with 24/7 supervision/assistance. Will benefit from RW as pt is unsteady with 2 episodes of retropulsion in standing. Also recommend HHPT to continue to progress endurance and safety. Treatment Diagnosis: functional mobility deficit; impaired aerobic capacity  Specific instructions for Next Treatment: progress mobility as tolerated  Prognosis: Good  Decision Making: Medium Complexity  PT Education: PT Role;General Safety; Energy Conservation;Disease Specific Education;Goals; Functional Mobility Training;Transfer Training;Plan of Care;Gait Training  Patient Education: Pt educated regarding importance of further gait and stair training to progress endurance, will require reinforcement. Educated regarding O2 line management throughout.    Barriers to Learning: safety awareness/insight into deficits  REQUIRES PT FOLLOW UP: Yes  Activity Tolerance  Activity Tolerance: Patient limited by fatigue;Patient limited by endurance  Activity Tolerance: Pretx: 92% on 4L, HR 74bpm. Immediately following ambulation: 90% on 4L, HR 87bpm with improvement to 91-92% with rest. No c/o chest pain, SOB or dizziness throughout. Patient Diagnosis(es): The primary encounter diagnosis was THEO (acute kidney injury) ([facility] [address] Diagnoses of Shortness of breath and Chest pain, unspecified type were also pertinent to this visit. has a past medical history of Arthritis, Asthma, Diabetes mellitus ([facility] [address] History of palpitations, Hyperlipidemia, and Hypertension. has a past surgical history that includes Cholecystectomy; Gastric bypass surgery (2005); Hysterectomy; hernia repair; cyst removal; Tonsillectomy; Colonoscopy; Endoscopy, colon, diagnostic; Coronary angioplasty (10/30/14); other surgical history (CYSTOSCOPY, RIGHT URETERAL STONE MANIPULATION WITH RIGHT); Total shoulder arthroplasty (Left, 12/15/2016); Cataract removal with implant (Right, 10/18/2017); and Cataract removal with implant (Left, 11/08/2017). Restrictions  Restrictions/Precautions  Restrictions/Precautions: Up as Tolerated, Fall Risk  Position Activity Restriction  Other position/activity restrictions: IV; 2-6L O2;  Subjective   General  Chart Reviewed: Yes  Response To Previous Treatment: Patient with no complaints from previous session. Family / Caregiver Present: No  Referring Practitioner: Dr. Vinay Jean MD  Subjective  Subjective: Pt seated in recliner upon arrival, agreeable to PT tx session. Reports she is leaving tomorrow. General Comment  Comments: RN clears for therapy, reports pt requesting to change into street clothes. Pain Screening  Patient Currently in Pain: Denies  Vital Signs  Patient Currently in Pain: Denies          Cognition   Cognition  Overall Cognitive Status: Exceptions  Arousal/Alertness: Delayed responses to stimuli  Following Commands: Follows one step commands with increased time; Follows one step commands with repetition  Attention Span: Attends with cues to redirect  Memory: Decreased recall of recent events  Safety Judgement: Decreased awareness of need for safety  Problem Solving: Decreased awareness of errors;Assistance required to correct errors made  Insights: Decreased awareness of deficits  Initiation: Requires cues for some  Sequencing: Requires cues for some  Objective   Bed mobility  Supine to Sit: Unable to assess (Pt seated in recliner upon arrival)  Sit to Supine: Supervision  Transfers  Sit to Stand: Stand by assistance  Stand to sit: Contact guard assistance  Comment: Pt with intermittent retropulsion requiring CGA  Ambulation  Ambulation?: Yes  Ambulation 1  Surface: level tile  Device: No Device  Other Apparatus: O2 (4L)  Assistance: Contact guard assistance;Minimal assistance  Quality of Gait: Without RW, pt demonstrates decreased step length, gait speed and mild unsteadiness. No overt LOB. Pt cued for O2 line management with good understanding of safety and carryover to techniques. Distance: 25ft + 12ft  Comments: SpO2 90% immediately following. Stairs/Curb  Stairs?: No     Balance  Posture: Fair  Sitting - Static: Good  Sitting - Dynamic: Good;-  Standing - Static: Fair;- (Pt with 2 episodes of retropulsion standing without UE support requiring up to min A to recover)  Standing - Dynamic: Fair;-            Comment: Pt ambulated to bathroom without device, continent of urine, able to perform pericare via seated weight shifts. Pt required assist to thread BLEs through underwear and pants, able to pull up in standing with SBA. Setup assist to don shirt. Pt performed hand hygiene standing at sink with SBA.     AM-PAC Score  AM-PAC Inpatient Mobility Raw Score : 19 (09/27/21 1450)  AM-PAC Inpatient T-Scale Score : 45.44 (09/27/21 1450)  Mobility Inpatient CMS 0-100% Score: 41.77 (09/27/21 1450)  Mobility Inpatient CMS G-Code Modifier : CK (09/27/21 1450)          Goals  Short term goals  Time Frame for Short term goals: 1 week (10/01) unless otherwise specified  Short term goal 1: Pt will be mod I with bed mobility. 9/27: supervision  Short term goal 2: Pt will be mod I with transfers with LRAD. 9/27: SBA-CGA  Short term goal 3: Pt will ambulate 50 ft with supervision and LRAD. 9/27: up to 25ft without device and CGA-min A  Short term goal 4: Pt will negotiate 2 stairs with rail and SBA. 9/27: DNT, pt declined further ambulation or stair training  Short term goal 5: 9/27: Pt will participate in 12-15 reps of BLE exercises to promote strength and activity tolerance. 9/27: Pt declined performance of exercise following functional mobility  Patient Goals   Patient goals : \"to go home\"    Plan    Plan  Times per week: 3-5x/wk  Times per day: Daily  Specific instructions for Next Treatment: progress mobility as tolerated  Current Treatment Recommendations: Strengthening, Neuromuscular Re-education, Home Exercise Program, Safety Education & Training, Balance Training, Endurance Training, Transfer Training, Gait Training, Stair training, Functional Mobility Training, Equipment Evaluation, Education, & procurement, Patient/Caregiver Education & Training  Safety Devices  Type of devices: All fall risk precautions in place, Bed alarm in place, Patient at risk for falls, Left in bed, Call light within reach, Nurse notified, Gait belt (Pt declined further sitting up in chair)     Therapy Time   Individual Concurrent Group Co-treatment   Time In 1416         Time Out 1439         Minutes 23         Timed Code Treatment Minutes: 1515 Maylin Wells PT     If pt is unable to be seen after this session, please let this note serve as discharge summary. Please see case management note for discharge disposition. Thank you.

## 2021-09-27 NOTE — PROGRESS NOTES
Oxygen documentation:      1. O2 saturation at REST on ROOM AIR = 80%      If saturation is 89% or above please proceed with steps 2 and 3.      2. O2 saturation with AMBULATION of _____ feet on ROOM AIR = _____%  3.  O2 saturation with AMBULATION on current liter flow = ______%  Rosita Cooper

## 2021-09-27 NOTE — PROGRESS NOTES
Nephrology Progress  Note                                                                                                                                                                                                                                                                                                                                                               Office : [phone]     Fax :[phone]              Patient's Name: Andrés Betancourt  11:26 AM  9/27/2021    Reason for Consult:  Metabolic acidosis , hyperkalemia   Requesting Physician:  Jamia Fernando      Chief Complaint:  Chest pain       Interval History :      Sitting in chair , gets SOB on exertion   C XR s/o PNA , small pleural effusion  On 3 - 5  L O2   Neutropenia : improved   BP soft  No diarrhea  No sob  No chest pain  No fever          History of Present Ilness:    Andrés Betancourt is a 66 y.o. female with PMH of CAD  who presented to [facility] with feelings of sob, cough, fatigue. Malaise  and mild chest pain.       Pt found to have mild THEO, CXR demonstrated small left pna. VSS.  No hypoxia   ECG demonstrated New LBBB       Nephrology consult for management of hyperkalemia and metabolic acidosis       Past Medical History:   Diagnosis Date    Arthritis     Asthma     Diabetes mellitus ([address]     type 2, takes PO meds    History of palpitations     Hyperlipidemia     Hypertension        Past Surgical History:   Procedure Laterality Date    CATARACT REMOVAL WITH IMPLANT Right 10/18/2017    entered to epic from h&P    CATARACT REMOVAL WITH IMPLANT Left 11/08/2017    CHOLECYSTECTOMY      COLONOSCOPY      CORONARY ANGIOPLASTY  10/30/14    CYST REMOVAL      from right wrist     ENDOSCOPY, COLON, DIAGNOSTIC      GASTRIC BYPASS SURGERY  2005    HERNIA REPAIR      HYSTERECTOMY      age 30    OTHER SURGICAL HISTORY  CYSTOSCOPY, RIGHT URETERAL STONE MANIPULATION WITH RIGHT    SHOULDER ARTHROPLASTY Left 12/15/2016    LEFT PROXIMAL HUMERUS OPEN REDUCTION INTERNAL FIXATION     TONSILLECTOMY         Family History   Problem Relation Age of Onset    Heart Attack Maternal Grandmother         reports that she has never smoked. She has never used smokeless tobacco. She reports that she does not drink alcohol and does not use drugs.     Allergies:  Morphine, Sulfamethoxazole, Trimethoprim, Alendronate, Bactrim [sulfamethoxazole-trimethoprim], Buspirone, Calcitonin (salmon), Demerol hcl [meperidine], Dust mite extract, Esomeprazole magnesium, Glipizide, Metformin, Mometasone, Omeprazole, Other, Oxycodone, Oxycodone-acetaminophen, Pcn [penicillins], Percocet [oxycodone-acetaminophen], Prednisone, Propoxyphene, Ranitidine, Sulfa antibiotics, Toradol [ketorolac tromethamine], Tramadol, and Zantac [ranitidine hcl]    Current Medications:    furosemide (LASIX) injection 40 mg, Once  gabapentin (NEURONTIN) capsule 500 mg, Nightly  magnesium oxide (MAG-OX) tablet 400 mg, BID  albuterol sulfate  (90 Base) MCG/ACT inhaler 2 puff, Q4H PRN  sodium chloride flush 0.9 % injection 5-40 mL, 2 times per day  0.9 % sodium chloride infusion, PRN  azithromycin (ZITHROMAX) tablet 500 mg, Daily  metoprolol succinate (TOPROL XL) extended release tablet 25 mg, Daily  perflutren lipid microspheres (DEFINITY) injection 1.65 mg, ONCE PRN  aspirin chewable tablet 324 mg, Once  glucose (GLUTOSE) 40 % oral gel 15 g, PRN  dextrose 50 % IV solution, PRN  glucagon (rDNA) injection 1 mg, PRN  dextrose 5 % solution, PRN  insulin lispro (HUMALOG) injection vial 0-12 Units, TID WC  insulin lispro (HUMALOG) injection vial 0-6 Units, Nightly  sodium chloride flush 0.9 % injection 5-40 mL, 2 times per day  sodium chloride flush 0.9 % injection 5-40 mL, PRN  enoxaparin (LOVENOX) injection 30 mg, BID  ondansetron (ZOFRAN-ODT) disintegrating tablet 4 mg, Q8H PRN   Or  ondansetron (ZOFRAN) injection 4 mg, Q6H PRN  polyethylene glycol (GLYCOLAX) packet 17 g, Daily PRN  famotidine (PEPCID) tablet 20 mg, BID  guaiFENesin-dextromethorphan (ROBITUSSIN DM) 100-10 MG/5ML syrup 5 mL, Q4H PRN  Vitamin D (CHOLECALCIFEROL) tablet 2,000 Units, Daily  0.9 % sodium chloride infusion, PRN  acetaminophen (TYLENOL) tablet 650 mg, Q6H PRN   Or  acetaminophen (TYLENOL) suppository 650 mg, Q6H PRN  aspirin chewable tablet 81 mg, Daily  atorvastatin (LIPITOR) tablet 40 mg, Nightly  melatonin disintegrating tablet 5 mg, Nightly  cyclobenzaprine (FLEXERIL) tablet 10 mg, TID PRN        Review of Systems:   14 point ROS obtained but were negative except mentioned in HPI      Physical exam:     Vitals:  /71   Pulse 81   Temp 97.8 °F (36.6 °C) (Oral)   Resp 16   Ht 5' 3\" (1.6 m)   Wt 165 lb (74.8 kg)   SpO2 91%   BMI 29.23 kg/m²   Constitutional:  OAA X3 NAD  Skin: no rash, turgor wnl  Heent:  eomi, mmm  Neck: no bruits or jvd noted  Cardiovascular:  S1, S2 without m/r/g  Respiratory: CTA B without w/r/r  Abdomen:  +bs, soft, nt, nd  Ext: no  lower extremity edema  Psychiatric: mood and affect appropriate  Musculoskeletal:  Rom, muscular strength intact    Data:   Labs:  CBC:   Recent Labs     09/25/21  0617   WBC 7.3   HGB 11.5*   *     BMP:    Recent Labs     09/25/21  0617 09/26/21  0846   * 135*   K 4.1 3.7    96*   CO2 20* 33*   BUN 13 11   CREATININE 0.7 0.7   GLUCOSE 118* 208*     Ca/Mg/Phos:   Recent Labs     09/25/21  0617 09/26/21  0846   CALCIUM 8.0* 7.8*     Hepatic:   No results for input(s): AST, ALT, ALB, BILITOT, ALKPHOS in the last 72 hours. Troponin:   Recent Labs     09/26/21  1136 09/26/21  1739   TROPONINI 0.02* 0.02*     BNP: No results for input(s): BNP in the last 72 hours. Lipids: No results for input(s): CHOL, TRIG, HDL, LDLCALC, LABVLDL in the last 72 hours. ABGs:   Recent Labs     09/26/21  1308   PHART 7.526*   PO2ART 71.2*   DFT2IIV 45.2*     INR: No results for input(s): INR in the last 72 hours.   UA:  Recent Labs 09/26/21  0405   PHUR 7.0      Urine Microscopic:   No results for input(s): LABCAST, BACTERIA, COMU, HYALCAST, WBCUA, RBCUA, EPIU in the last 72 hours. Urine Culture:   No results for input(s): LABURIN in the last 72 hours. Urine Chemistry: No results for input(s): Maribell Adelina, PROTEINUR, NAUR in the last 72 hours. IMAGING:  XR CHEST 1 VIEW   Final Result   Persistent bilateral airspace disease, suspicious for pneumonia, decreased on   the left, but increased on the right. XR CHEST 1 VIEW   Final Result   Progressive bilateral airspace disease, remaining consistent with pneumonia. Small right pleural effusion. CT CHEST ABDOMEN PELVIS WO CONTRAST   Final Result   1. Commonly reported imaging features of COVID-19 pneumonia are present. Other processes such as influenza pneumonia and organizing pneumonia, as can   be seen with drug toxicity and connective tissue disease, can cause a similar   imaging pattern. PneTyp   2. No acute findings in the abdomen and pelvis. XR CHEST PORTABLE   Final Result   Increasing airspace opacity left lower lobe         XR CHEST PORTABLE   Final Result   Small left basilar pneumonia. XR HIP RIGHT (2-3 VIEWS)   Final Result   No acute finding of the right hip. MRI BRAIN W WO CONTRAST    (Results Pending)       Assessment/Plan     1. AGMA    Anion Gap :   15   -----> 17  ----> 14    Serum Bicarb : 19   ------> 15 -----> 18 ----> 20    Denies diarrhea   Denies vomiting    S/p IVF  NS  On 9/19 , 9/20     Plan      DC IVF     Encourage PO intake of fluids to thirst    Avoid volume overload    Lasix prn for volume overload    Hold BP medicine                   2. Hyperkalemia in setting of metabolic acidosis     F/u BMP      S/p lokelma     Serum K   5.1 ----> 5.7 ------> 4.9     Avoid ACEI or ARB    Low K diet     3 THOE    Serum cr improved from 1.4 to 1.2   ---> 0.7    Avoid contrast    Avoid ACEI OR ARB      3. CAD    4.  Aortic sclerosis     5 HTN    6 DLP     7 Acute hypoxic respiratory failure     O2    Lasix 40 mg iv x 1 on 9/27                Thank you for allowing us to participate in care of Ryan Delgado MD  Feel free to contact me   [facility] [address]  Office : [phone]  Fax :[phone]

## 2021-09-27 NOTE — CONSULTS
INPATIENT PULMONARY CRITICAL CARE CONSULT NOTE      Chief Complaint/Referring Provider:  Patient is being seen at the request of Dr. Gary Palomares for a consultation for Hypoxia     Presenting HPI: The hospital because of increasing shortness of breath and hip pain    As per the ER provider-Hilaria Thompson is a 66 y.o. female who presents to the emergency department stating that she has been not feeling well for the past 4 days, she has had fevers, chills, runny nose, shortness of breath, and found out that her entire family is positive with Covid. They are unvaccinated, and she is vaccinated. She also states that she has had multiple falls over the last 3 days, she had another fall this morning, and she now has pain to her right hip. She rates her pain level as 5/10. She also concurrently admits to some increased frequency and urgency of urination. Nothing seems to make her feel completely better.   Patient when seen this evening states that she was having increased shortness of breath, patient states that her requirement for albuterol inhaler was going up, patient states that along with her shortness of breath she was having some nasal congestion and rhinorrhea, patient also has some sensation of fever and chills, patient on questioning further states that she did not have any significant palpitations or diaphoresis per se, patient did not have any overt wheezing of concern, patient on questioning further did not have any significant abdominal pain but has been having significant nausea, patient did not have any vomiting, patient did not have any hematochezia or melena, patient does not complain of any dysuria or hematuria, patient did not have any increasing leg edema as per the patient, patient states that she was also weak, patient did not have any change in them environment any sick contacts, patient does not have any history of snoring or any sleep fragmentation which she states, patient has not been a smoker, no other pertinent review of system of     Patient Active Problem List    Diagnosis Date Noted    SOB (shortness of breath) 09/26/2021    Pulmonary infiltrates 09/26/2021    Normocytic normochromic anemia 09/26/2021    Thrombocytopenia ([facility] [address] 09/26/2021    Elevated LFTs 09/26/2021    Cardiomyopathy ([facility] [address] 17/99/5919    Diastolic dysfunction 70/58/7350    Abnormal ECG     Aortic valve sclerosis     PNA (pneumonia) 09/19/2021    Pneumonia 09/19/2021    Closed fracture of proximal end of left humerus 12/13/2016    Coronary artery disease involving native coronary artery of native heart without angina pectoris 06/08/2016    PVC (premature ventricular contraction) 03/27/2015    Palpitations 03/27/2015    Gross hematuria 01/05/2015    Obstructive uropathy 01/05/2015    Chest pain 10/21/2014    Hypertension 10/21/2014    Hyperlipidemia 10/21/2014       Past Medical History:   Diagnosis Date    Arthritis     Asthma     Diabetes mellitus ([facility] [address]     type 2, takes PO meds    History of palpitations     Hyperlipidemia     Hypertension         Past Surgical History:   Procedure Laterality Date    CATARACT REMOVAL WITH IMPLANT Right 10/18/2017    entered to epic from h&P    CATARACT REMOVAL WITH IMPLANT Left 11/08/2017    CHOLECYSTECTOMY      COLONOSCOPY      CORONARY ANGIOPLASTY  10/30/14    CYST REMOVAL      from right wrist     ENDOSCOPY, COLON, DIAGNOSTIC      GASTRIC BYPASS SURGERY  2005    HERNIA REPAIR      HYSTERECTOMY      age 30    OTHER SURGICAL HISTORY  CYSTOSCOPY, RIGHT URETERAL STONE MANIPULATION WITH RIGHT    SHOULDER ARTHROPLASTY Left 12/15/2016    LEFT PROXIMAL HUMERUS OPEN REDUCTION INTERNAL FIXATION     TONSILLECTOMY          Family History   Problem Relation Age of Onset    Heart Attack Maternal Grandmother         Social History     Tobacco Use    Smoking status: Never Smoker    Smokeless tobacco: Never Used   Substance Use Topics    Alcohol use: No        Allergies Allergen Reactions    Morphine Other (See Comments)     Chest pain    Sulfamethoxazole      Other reaction(s): Nausea/vomit    Trimethoprim      Other reaction(s): Nausea/vomit    Alendronate Other (See Comments)     Chest pain     Bactrim [Sulfamethoxazole-Trimethoprim]     Buspirone      Other reaction(s): Other (See Comments)  Gi upset     Calcitonin ([place])      Other reaction(s): Other (See Comments)  Headsaches     Demerol Hcl [Meperidine]     Dust Mite Extract     Esomeprazole Magnesium      Other reaction(s): Other (See Comments)  ELEVATED BP    Glipizide Other (See Comments)     Hypoglycemia    Metformin Diarrhea    Mometasone     Omeprazole      Other reaction(s): Other (See Comments)  Sick to stomach     Other Other (See Comments)     Trazadone- vomiting     Oxycodone      Other reaction(s): Nausea;Vomitting    Oxycodone-Acetaminophen     Pcn [Penicillins]      Other reaction(s): Trouble Breathing  Nausea      Percocet [Oxycodone-Acetaminophen]      Nausea      Prednisone     Propoxyphene     Ranitidine      Other reaction(s): Nausea    Sulfa Antibiotics     Toradol [Ketorolac Tromethamine] Other (See Comments)     Muscle spasms in chest    Tramadol      Other reaction(s): Other (See Comments)  Dizziness     Zantac [Ranitidine Hcl]                Physical Exam:  Blood pressure 128/68, pulse 65, temperature 97.7 °F (36.5 °C), temperature source Oral, resp. rate 17, height 5' 3\" (1.6 m), weight 165 lb (74.8 kg), SpO2 92 %, not currently breastfeeding.'     Constitutional:  No acute distress. While lying in the bed on nasal cannula oxygen   HENT:  Oropharynx is clear and moist. No thyromegaly. Eyes:  Conjunctivae are normal. Pupils equal, round, and reactive to light. No scleral icterus. Neck: . No tracheal deviation present. No obvious thyroid mass. Cardiovascular: Normal rate, regular rhythm, normal heart sounds. No right ventricular heave.   Some lower extremity edema.  Pulmonary/Chest: No wheezes. No rales. Chest wall is not dull to percussion. No accessory muscle usage or stridor. Decreased breath sound density  Abdominal: Soft. Bowel sounds present. No distension or hernia. No tenderness. Musculoskeletal: No cyanosis. No clubbing. No obvious joint deformity. Lymphadenopathy: No cervical or supraclavicular adenopathy. Skin: Skin is warm and dry. No rash or nodules on the exposed extremities. Psychiatric: Normal mood and affect. Behavior is normal.  No anxiety. Neurologic: Alert, awake and oriented. PERRL. Speech fluent        Results:  CBC:   Recent Labs     09/24/21  0615 09/25/21  0617   WBC 5.5 7.3   HGB 12.0 11.5*   HCT 37.6 35.9*   MCV 87.9 88.3   * 125*     BMP:   Recent Labs     09/24/21  0615 09/25/21  0617 09/26/21  0846   * 134* 135*   K 4.3 4.1 3.7    101 96*   CO2 20* 20* 33*   BUN 19 13 11   CREATININE 0.9 0.7 0.7       Imaging:  I have reviewed radiology images personally. XR CHEST 1 VIEW   Final Result   Persistent bilateral airspace disease, suspicious for pneumonia, decreased on   the left, but increased on the right. XR CHEST 1 VIEW   Final Result   Progressive bilateral airspace disease, remaining consistent with pneumonia. Small right pleural effusion. CT CHEST ABDOMEN PELVIS WO CONTRAST   Final Result   1. Commonly reported imaging features of COVID-19 pneumonia are present. Other processes such as influenza pneumonia and organizing pneumonia, as can   be seen with drug toxicity and connective tissue disease, can cause a similar   imaging pattern. PneTyp   2. No acute findings in the abdomen and pelvis. XR CHEST PORTABLE   Final Result   Increasing airspace opacity left lower lobe         XR CHEST PORTABLE   Final Result   Small left basilar pneumonia. XR HIP RIGHT (2-3 VIEWS)   Final Result   No acute finding of the right hip.          MRI BRAIN W WO CONTRAST    (Results Pending)     Results for Yudy Zee (MRN [id number]) as of 9/26/2021 21:28   Ref. Range 9/25/2021 06:17 9/25/2021 13:26 9/25/2021 17:01 9/25/2021 20:23 9/26/2021 04:05 9/26/2021 07:23 9/26/2021 08:46 9/26/2021 11:36 9/26/2021 12:39 9/26/2021 16:23 9/26/2021 17:39 9/26/2021 20:15   Sodium Latest Ref Range: 136 - 145 mmol/L 134 (L)      135 (L)        Potassium Latest Ref Range: 3.5 - 5.1 mmol/L 4.1      3.7        Chloride Latest Ref Range: 99 - 110 mmol/L 101      96 (L)        CO2 Latest Ref Range: 21 - 32 mmol/L 20 (L)      33 (H)        BUN Latest Ref Range: 7 - 20 mg/dL 13      11        Creatinine Latest Ref Range: 0.6 - 1.2 mg/dL 0.7      0.7        Anion Gap Latest Ref Range: 3 - 16  13      6        GFR Non- Latest Ref Range: >60  >60      >60        GFR  Latest Ref Range: >60  >60      >60        Glucose Latest Ref Range: 70 - 99 mg/dL 118 (H)      208 (H)        POC Glucose Latest Ref Range: 70 - 99 mg/dl  128 (H) 193 (H) 165 (H)  186 (H)   166 (H) 121 (H)  106 (H)   Calcium Latest Ref Range: 8.3 - 10.6 mg/dL 8.0 (L)      7.8 (L)        Troponin Latest Ref Range: <0.01 ng/mL        0.02 (H)   0.02 (H)      Results for Yudy Zee (MRN [id number]) as of 9/26/2021 21:28   Ref.  Range 9/22/2021 11:44 9/23/2021 06:03 9/24/2021 06:15 9/25/2021 06:17   WBC Latest Ref Range: 4.0 - 11.0 K/uL 3.3 (L) 2.9 (L) 5.5 7.3   RBC Latest Ref Range: 4.00 - 5.20 M/uL 4.10 4.09 4.27 4.07   Hemoglobin Quant Latest Ref Range: 12.0 - 16.0 g/dL 11.5 (L) 11.5 (L) 12.0 11.5 (L)   Hematocrit Latest Ref Range: 36.0 - 48.0 % 36.7 36.3 37.6 35.9 (L)   MCV Latest Ref Range: 80.0 - 100.0 fL 89.7 88.9 87.9 88.3   MCH Latest Ref Range: 26.0 - 34.0 pg 28.1 28.0 28.0 28.2   MCHC Latest Ref Range: 31.0 - 36.0 g/dL 31.4 31.5 31.9 32.0   MPV Latest Ref Range: 5.0 - 10.5 fL 8.0 8.7 8.6 8.6   RDW Latest Ref Range: 12.4 - 15.4 % 23.9 (H) 23.7 (H) 24.0 (H) 23.9 (H)   Platelet Count Latest Ref Range: 135 - 450 K/uL 102 (L) 97 (L) 113 (L) 125 (L)   Neutrophils % Latest Units: % 79.5 68.8 82.0 87.4   Lymphocyte % Latest Units: % 16.1 26.4 14.8 9.9   Results for Micheal West (MRN [id number]) as of 9/26/2021 21:28   Ref. Range 9/19/2021 12:02 9/19/2021 13:00 9/21/2021 14:49 9/21/2021 14:52 9/21/2021 21:27 9/24/2021 05:00 9/24/2021 06:05   CULTURE, BLOOD 2 Unknown    Rpt      CULTURE, URINE Unknown     Rpt     Culture, Blood 2 Unknown    No Growth after 4 days of incubation. Urine Reflex to Culture Unknown Not Indicated         Rapid Influenza A Ag Latest Ref Range: Negative       Negative    Rapid Influenza B Ag Latest Ref Range: Negative       Negative    RAPID INFLUENZA A/B ANTIGENS Unknown      Rpt    SARS-CoV-2, NAAT Latest Ref Range: Not Detected   Not Detected Not Detected       L. pneumophila Serogp 1 Ur Ag Unknown       Presumptive Negat. .. LEGIONELLA ANTIGEN, URINE Unknown       Rpt     Results for Micheal West (MRN [id number]) as of 9/26/2021 21:28   Ref.  Range 9/19/2021 12:02 9/24/2021 06:05 9/24/2021 10:20   Color, UA Latest Ref Range: Straw/Yellow  Yellow     Clarity, UA Latest Ref Range: Clear  Clear     Glucose, UA Latest Ref Range: Negative mg/dL >=1000 (A)     Bilirubin, Urine Latest Ref Range: Negative  Negative     Ketones, Urine Latest Ref Range: Negative mg/dL 15 (A)     Specific [place], UA Latest Ref Range: 1.005 - 1.030  1.010     Blood, Urine Latest Ref Range: Negative  TRACE-INTACT (A)     pH, UA Unknown 5.5  6.0   Protein, UA Latest Ref Range: Negative mg/dL Negative     Urobilinogen, Urine Latest Ref Range: <2.0 E.U./dL 0.2     Nitrite, Urine Latest Ref Range: Negative  Negative     Leukocyte Esterase, Urine Latest Ref Range: Negative  Negative     Urine Type Unknown NotGiven     Urine Reflex to Culture Unknown Not Indicated     WBC, UA Latest Ref Range: 0 - 5 /HPF 0-2     RBC, UA Latest Ref Range: 0 - 4 /HPF 0-2     Epithelial Cells, UA Latest Ref Range: 0 - 5 /HPF 2-5 Microscopic Examination Unknown YES     URINE RT REFLEX TO CULTURE Unknown Rpt (A)     STREP PNEUMONIAE ANTIGEN Unknown  Rpt    STREP PNEUMONIAE ANTIGEN, URINE Unknown  Presumptive Negat. .. Results for Rosa Isela Lan (MRN [id number]) as of 9/26/2021 21:28   Ref. Range 9/26/2021 13:08   Hemoglobin, Art, Extended Latest Ref Range: 12.0 - 16.0 g/dL 10.1 (L)   pH, Arterial Latest Ref Range: 7.350 - 7.450  7.526 (H)   pCO2, Arterial Latest Ref Range: 35.0 - 45.0 mmHg 45.2 (H)   pO2, Arterial Latest Ref Range: 75.0 - 108.0 mmHg 71.2 (L)   HCO3, Arterial Latest Ref Range: 21.0 - 29.0 mmol/L 36.6 (H)   TCO2 (calc), Art Latest Ref Range: Not Established mmol/L 38.0   Base Excess, Arterial Latest Ref Range: -3.0 - 3.0 mmol/L 12.5 (H)   O2 Sat, Arterial Latest Ref Range: >92 % 93.2   Methemoglobin, Arterial Latest Ref Range: <1.5 % 0.3   Carboxyhgb, Arterial Latest Ref Range: 0.0 - 1.5 % 0.3      Results for Rosa Isela Lan (MRN [id number]) as of 9/26/2021 21:28   Ref. Range 9/24/2021 10:20 9/26/2021 04:05   Amphetamine Screen, Urine Latest Ref Range: Negative <1000ng/mL  Neg Neg   Benzodiazepine Screen, Urine Latest Ref Range: Negative <200 ng/mL  Neg Neg   Cocaine Metabolite Screen, Urine Latest Ref Range: Negative <300 ng/mL  Neg Neg   Methadone Screen, Urine Latest Ref Range: Negative <300 ng/mL  Neg Neg   Opiate Scrn, Ur Latest Ref Range: Negative <300 ng/mL  Neg Neg   Oxycodone Urine Latest Ref Range: Negative <100 ng/ml  Neg Neg   PCP Screen, Urine Latest Ref Range: Negative <25 ng/mL  Neg Neg   Cannabinoid Scrn, Ur Latest Ref Range: Negative <50 ng/mL  Neg Neg   Drug Screen Comment: Unknown see below see below       CT chest -  1.  Commonly reported imaging features of COVID-19 pneumonia are present. Other processes such as influenza pneumonia and organizing pneumonia, as can   be seen with drug toxicity and connective tissue disease, can cause a similar   imaging pattern. PneTyp   2.  No acute findings in the abdomen and pelvis. Echocardiogram:Summary   The left ventricular systolic function is moderately reduced with an   ejection fraction of 30-35%. There is hypokinesis of the apex, apical lateral, apical septum, anterior,   anteroseptum and apical anterior walls. Grade I diastolic dysfunction with normal filling pressure. Changes noted from previous echo on 6- in left ventricular function. Mild mitral regurgitation. PFT: None in Epic     Results for Jose Farser (MRN [id number]) as of 9/26/2021 21:28   Ref. Range 9/19/2021 09:47 9/23/2021 06:03   Albumin Latest Ref Range: 3.4 - 5.0 g/dL 3.7 2.8 (L)   Globulin Latest Units: g/dL 3.1    Albumin/Globulin Ratio Latest Ref Range: 1.1 - 2.2  1.2    Alk Phos Latest Ref Range: 40 - 129 U/L 58 49   ALT Latest Ref Range: 10 - 40 U/L 44 (H) 34   AST Latest Ref Range: 15 - 37 U/L 47 (H) 68 (H)   Bilirubin Latest Ref Range: 0.0 - 1.0 mg/dL 0.7 0.6   Bilirubin, Direct Latest Ref Range: 0.0 - 0.3 mg/dL  <0.2   Bilirubin, Indirect Latest Ref Range: 0.0 - 1.0 mg/dL  see below   Total Protein Latest Ref Range: 6.4 - 8.2 g/dL 6.8 5.9 (L)     EKG-Normal sinus rhythmIncomplete left bundle branch blockSeptal infarct , age undeterminedPossible Lateral infarct , age undetermined    Results for Jose Fraser (MRN [id number]) as of 9/26/2021 21:28   Ref. Range 9/24/2021 06:15   CRP Latest Ref Range: 0.0 - 5.1 mg/L 21.5 (H)       Assessment:  Active Problems:    Chest pain    Hypertension    Hyperlipidemia    PVC (premature ventricular contraction)    Coronary artery disease involving native coronary artery of native heart without angina pectoris    PNA (pneumonia)    Pneumonia    Abnormal ECG    Aortic valve sclerosis    SOB (shortness of breath)    Pulmonary infiltrates    Normocytic normochromic anemia    Thrombocytopenia (HCC)    Elevated LFTs    Cardiomyopathy (HCC)    Diastolic dysfunction  Resolved Problems:    * No resolved hospital problems. *          Plan:   · Oxygen supplementation to keep saturation being 90 to 94% only  · Please titrate the oxygen as per the above parameters  · Patient was on meropenem vancomycin along with Zithromax on admission which was changed to only Zithromax tablet which is continuing as per ID  · Patient's CT of the chest was reviewed and patient does have pulmonary infiltrates and the differential diagnosis includes that of viral pneumonitis/organizing pneumonia  · No clinical data at this time to assist patient to have acute bacterial infection  · Patient's COVID-19 PCR's are negative  · Consider COVID-19 antigen testing, if deemed appropriate to see if patient had any COVID-19 infection in the past-ID decide upon that  · Patient does not have any acute bronchospasm at this time  · ? Role of steroids  · Bronchodilators on whenever necessary basis  · Patient's echocardiogram shows patient to have cardiomyopathy with diastolic dysfunction and evaluation management as per IM/cardiology  · No clinical stigmata of any collagen vascular disorder  · Consider IV diuretics if deemed appropriate  · Monitor input output and BMP  · Correct electrolytes on whenever necessary basis  · Trend platelet counts and LFTs  · Internal medicine team to decide upon continuation of Lovenox on a twice daily basis  · Blood glucose monitoring with sliding scale insulin  · PUD prophylaxis as per IM          Electronically signed by:  Odin Oro MD    9/26/2021    9:44 PM.

## 2021-09-27 NOTE — PROGRESS NOTES
INPATIENT PULMONARY CRITICAL CARE PROGRESS NOTE      Reason for visit     Hypoxia       SUBJECTIVE: Patient when seen this morning was adamant that she wants to go home, patient was refusing any supplemental oxygen and patient saturation was only 83% on room air, patient was not complaining of any significant cough expectoration shortness of breath or chest wheezing per se, no chest pain or palpitations, patient was afebrile and hemodynamically maintained, patient had acceptable glycemic control, patient was alert and oriented and no other pertinent review of systems of concern         Physical Exam:  Blood pressure 118/71, pulse 81, temperature 97.8 °F (36.6 °C), temperature source Oral, resp. rate 16, height 5' 3\" (1.6 m), weight 165 lb (74.8 kg), SpO2 91 %, not currently breastfeeding.'     Constitutional:  No acute distress. While lying in the bed on nasal cannula oxygen   HENT:  Oropharynx is clear and moist. No thyromegaly. Eyes:  Conjunctivae are normal. Pupils equal, round, and reactive to light. No scleral icterus. Neck: . No tracheal deviation present. No obvious thyroid mass. Cardiovascular: Normal rate, regular rhythm, normal heart sounds. No right ventricular heave. Some lower extremity edema. Pulmonary/Chest: No wheezes. No rales. Chest wall is not dull to percussion. No accessory muscle usage or stridor. Decreased breath sound density  Abdominal: Soft. Bowel sounds present. No distension or hernia. No tenderness. Musculoskeletal: No cyanosis. No clubbing. No obvious joint deformity. Lymphadenopathy: No cervical or supraclavicular adenopathy. Skin: Skin is warm and dry. No rash or nodules on the exposed extremities. Psychiatric: Normal mood and affect. Behavior is normal.  No anxiety. Neurologic: Alert, awake and oriented. PERRL.   Speech fluent          Results:  CBC:   Recent Labs     09/25/21  0617   WBC 7.3   HGB 11.5*   HCT 35.9*   MCV 88.3   *     BMP:   Recent Labs 09/25/21  0617 09/26/21  0846   * 135*   K 4.1 3.7    96*   CO2 20* 33*   BUN 13 11   CREATININE 0.7 0.7     UA:  Recent Labs     09/26/21  0405   PHUR 7.0       Imaging:  I have reviewed radiology images personally. XR CHEST 1 VIEW   Final Result   Persistent bilateral airspace disease, suspicious for pneumonia, decreased on   the left, but increased on the right. XR CHEST 1 VIEW   Final Result   Progressive bilateral airspace disease, remaining consistent with pneumonia. Small right pleural effusion. CT CHEST ABDOMEN PELVIS WO CONTRAST   Final Result   1. Commonly reported imaging features of COVID-19 pneumonia are present. Other processes such as influenza pneumonia and organizing pneumonia, as can   be seen with drug toxicity and connective tissue disease, can cause a similar   imaging pattern. PneTyp   2. No acute findings in the abdomen and pelvis. XR CHEST PORTABLE   Final Result   Increasing airspace opacity left lower lobe         XR CHEST PORTABLE   Final Result   Small left basilar pneumonia. XR HIP RIGHT (2-3 VIEWS)   Final Result   No acute finding of the right hip. MRI BRAIN W WO CONTRAST    (Results Pending)     Echo Complete    Result Date: 9/22/2021  Transthoracic Echocardiography Report (TTE)  Demographics   Patient Name       [facility]   Date of Study      09/22/2021        Gender                Female   Patient Number     [id number]        Date of Birth         1950   Visit Number       [id number]         Age                   66 year(s)   Accession Number   [id number]        Room Number           9011   Corporate ID       [id number]          Sonographer           RT Marylou   Ordering Physician Palmira Batista, CNP  Interpreting          [address] [place] [address].                                       Physician             Willi RAMOS MD  Procedure Type of Study   TTE procedure:ECHOCARDIOGRAM COMPLETE 2D W DOPPLER W COLOR. Procedure Date Date: 09/22/2021 Start: 08:03 AM Study Location: [address] [place] [address] - Echo Lab Technical Quality: Adequate visualization Indications:Chest pain. Patient Status: Routine Height: 63 inches Weight: 153 pounds BSA: 1.73 m2 BMI: 27.1 kg/m2 BP: 120/72 mmHg  Conclusions   Summary  The left ventricular systolic function is moderately reduced with an  ejection fraction of 30-35%. There is hypokinesis of the apex, apical lateral, apical septum, anterior,  anteroseptum and apical anterior walls. Grade I diastolic dysfunction with normal filling pressure. Changes noted from previous echo on 6- in left ventricular function. Mild mitral regurgitation. Signature   ------------------------------------------------------------------  Electronically signed by Ayush Cohen MD (Interpreting  physician) on 09/22/2021 at 09:50 AM  ------------------------------------------------------------------   Findings   Left Ventricle  The left ventricular systolic function is moderately reduced with an  ejection fraction of 30-35 %. There is hypokinesis of the apex, apical lateral, apical septum, anterior,  anteroseptum and apical anterior walls. Normal size left ventricle and wall thickness. Grade I diastolic dysfunction with normal filling pressure. Changes noted from previous echo on 6- in left ventricular function. Mitral Valve  Mild thickening of leaflets of mitral valve. No mitral stenosis. Mild mitral regurgitation. Left Atrium  The left atrium is normal in size. Aortic Valve  Aortic valve appears thickened/calcified, difficult to evaluate for AS on  this study. Trivial aortic valve regurgitation. Aorta  The aortic root is normal in size. Right Ventricle  The right ventricle is normal in size and function. TAPSE is measured at 17 mm. S'prime velocity is measured at 12 cm/s.    Tricuspid Valve  Tricuspid valve is structurally normal.  No tricuspid regurgitation to estimate systolic pulmonary artery pressure  (SPAP). Right Atrium  The right atrium is normal in size at 11 cm2. Pulmonic Valve  The pulmonic valve is not well visualized. Trivial pulmonic regurgitation present. Pericardial Effusion  No pericardial effusion noted. Pleural Effusion  No pleural effusion. Miscellaneous  IVC not well visualized. M-Mode/2D Measurements (cm)   LV Diastolic Dimension: 4.7 cm LV Systolic Dimension: 3.7 cm  LV Septum Diastolic: 5.07 cm  LV PW Diastolic: 8.70 cm       AO Root Dimension: 2.2 cm                                 AV Cusp Separation: 1.5 cm                                 LA Dimension: 2.3 cm                                 LA Area: 14 cm2                                 LA volume/Index: 31 ml /18 ml/m2  Doppler Measurements   AV Peak Velocity: 143 cm/s  MV Peak E-Wave: 61 cm/s  AV Peak Gradient: 8.18 mmHg MV Peak A-Wave: 102 cm/s                              MV E/A Ratio: 0.6   E' Septal Velocity: 6 cm/s  E' Lateral Velocity: 9 cm/s  E/E' ratio: 8.7  PV Peak Velocity: 84 cm/s  PV Peak Gradient: 2.82 mmHg   Aortic Valve   Peak Velocity: 143 cm/s  Peak Gradient: 8.18 mmHg   Cusp Separation: 1.5 cm  Aorta   Aortic Root: 2.2 cm      XR HIP RIGHT (2-3 VIEWS)    Result Date: 9/19/2021  EXAMINATION: TWO XRAY VIEWS OF THE RIGHT HIP 9/19/2021 9:45 am COMPARISON: None. HISTORY: ORDERING SYSTEM PROVIDED HISTORY: injury TECHNOLOGIST PROVIDED HISTORY: Reason for exam:->injury Reason for Exam: multiple falls over the course of a few days Acuity: Acute Type of Exam: Initial FINDINGS: No acute fracture or dislocation of the right hip. Symmetric pattern of mild degenerative change in both hips. Pelvic bones are intact. No significant soft tissue finding. No acute finding of the right hip.      XR CHEST PORTABLE    Result Date: 9/21/2021  EXAMINATION: ONE XRAY VIEW OF THE CHEST 9/21/2021 2:48 pm COMPARISON: 09/19/2021 HISTORY: ORDERING SYSTEM PROVIDED HISTORY: fever TECHNOLOGIST PROVIDED HISTORY: Reason for exam:->fever Reason for Exam: fever Acuity: Acute Type of Exam: Initial FINDINGS: Cardiomediastinal silhouette within normal limits. Increased left basilar opacity. No pulmonary vascular congestion or edema. No pneumothorax. No pleural effusion. Increasing airspace opacity left lower lobe     XR CHEST PORTABLE    Result Date: 9/19/2021  EXAMINATION: ONE XRAY VIEW OF THE CHEST 9/19/2021 9:45 am COMPARISON: 10/16/2015 HISTORY: ORDERING SYSTEM PROVIDED HISTORY: Shortness of breath TECHNOLOGIST PROVIDED HISTORY: Reason for exam:->Shortness of breath Reason for Exam: SOB Acuity: Acute Type of Exam: Initial FINDINGS: Heart size is normal.  Small amount of opacity laterally in the left lung base. Lungs are otherwise clear. No CHF, adenopathy, or pleural effusion. Prior fixation of proximal humeral fracture. Small left basilar pneumonia. XR CHEST 1 VIEW    Result Date: 9/26/2021  EXAMINATION: ONE XRAY VIEW OF THE CHEST 9/26/2021 11:30 am COMPARISON: 09/25/2021 HISTORY: ORDERING SYSTEM PROVIDED HISTORY: Hypoxia TECHNOLOGIST PROVIDED HISTORY: Reason for exam:->Hypoxia Reason for Exam: Hypoxia and SOB Acuity: Acute Type of Exam: Initial FINDINGS: Heart size is stable. Mediastinal contours are stable Scattered opacities are seen throughout the lungs, slightly decreased on the left, but slightly increased on the right. Persistent bilateral airspace disease, suspicious for pneumonia, decreased on the left, but increased on the right. XR CHEST 1 VIEW    Result Date: 9/25/2021  EXAMINATION: ONE XRAY VIEW OF THE CHEST 9/25/2021 11:03 am COMPARISON: CT chest, 09/23/2021 HISTORY: ORDERING SYSTEM PROVIDED HISTORY: Chest pain TECHNOLOGIST PROVIDED HISTORY: Reason for exam:->Chest pain FINDINGS: The cardiac silhouette is normal.  Thoracic aortic calcification is present. Hilar contours are normal.  There is increased airspace disease in the lower lungs, greater on the left.   The left airspace disease is more consolidated centrally in the interval.  There is blunting of the right costophrenic angle. No pneumothorax is seen. Fixation hardware in the proximal left humerus is unremarkable. Progressive bilateral airspace disease, remaining consistent with pneumonia. Small right pleural effusion. CT CHEST ABDOMEN PELVIS WO CONTRAST    Result Date: 9/23/2021  EXAMINATION: CT OF THE CHEST, ABDOMEN, AND PELVIS WITHOUT CONTRAST 9/23/2021 8:02 pm TECHNIQUE: CT of the chest, abdomen and pelvis was performed without the administration of intravenous contrast. Multiplanar reformatted images are provided for review. Dose modulation, iterative reconstruction, and/or weight based adjustment of the mA/kV was utilized to reduce the radiation dose to as low as reasonably achievable. COMPARISON: 01/05/2015 and 01/28/2014 HISTORY: ORDERING SYSTEM PROVIDED HISTORY: FUO TECHNOLOGIST PROVIDED HISTORY: Reason for exam:->FUO Additional Contrast?->Oral Reason for Exam: FUO Acuity: Acute Type of Exam: Subsequent/Follow-up FINDINGS: Chest: Mediastinum: The heart size is normal.  There is no pericardial effusion. Aortic caliber is normal.  There may be a small hiatal hernia. There is no adenopathy. Lungs/pleura: There are tiny bilateral pleural effusions. There is no pneumothorax. The central airways are patent. There are bilateral ground-glass airspace opacities, most pronounced in the lingula and adjacent left lower lobe spanning the major fissure. Soft Tissues/Bones: No acute findings. Abdomen/Pelvis: Organs: The liver, spleen, pancreas, adrenal glands and kidneys are grossly negative given the limitations of the noncontrast. GI/Bowel: Gastric bypass is again noted. Small bowel caliber is normal.  The appendix is normal.  The colon is unremarkable. Pelvis: The bladder is grossly negative. The patient is status post hysterectomy. Peritoneum/Retroperitoneum: No adenopathy, mesenteric stranding or free fluid.   Aortic caliber is normal. Bones/Soft Tissues: No acute findings. 1.  Commonly reported imaging features of COVID-19 pneumonia are present. Other processes such as influenza pneumonia and organizing pneumonia, as can be seen with drug toxicity and connective tissue disease, can cause a similar imaging pattern. PneTyp 2. No acute findings in the abdomen and pelvis. Results for Isabella Cox (MRN [id number]) as of 9/27/2021 11:30   Ref. Range 9/26/2021 16:23 9/26/2021 17:39 9/26/2021 20:15 9/27/2021 08:33 9/27/2021 11:12   POC Glucose Latest Ref Range: 70 - 99 mg/dl 121 (H)  106 (H) 161 (H) 127 (H)   Troponin Latest Ref Range: <0.01 ng/mL  0.02 (H)            Assessment:  Active Problems:    Chest pain    Hypertension    Hyperlipidemia    PVC (premature ventricular contraction)    Coronary artery disease involving native coronary artery of native heart without angina pectoris    PNA (pneumonia)    Pneumonia    Abnormal ECG    Aortic valve sclerosis    SOB (shortness of breath)    Pulmonary infiltrates    Normocytic normochromic anemia    Thrombocytopenia (HCC)    Elevated LFTs    Cardiomyopathy (HCC)    Diastolic dysfunction  Resolved Problems:    * No resolved hospital problems.  *          Plan:   · Oxygen supplementation to keep saturation being 90 to 94% only  · Patient was told about the pathology of the process and the need for oxygen  · Patient states that she is going to go home even if she has to sign AMA  · Patient was advised to at least take the oxygen in the hospital and also can arrange for home oxygen which she can use without going AMA and patient was receptive to the idea  · Please titrate the oxygen as per the above parameters  · Patient was on meropenem vancomycin along with Zithromax on admission which was changed to only Zithromax tablet which is continuing as per ID  · Patient's CT of the chest was reviewed and patient does have pulmonary infiltrates and the differential diagnosis includes that of viral pneumonitis/organizing pneumonia  · No clinical data at this time to assist patient to have acute bacterial infection  · Patient's COVID-19 PCR's are negative  · Consider COVID-19 antigen testing, if deemed appropriate to see if patient had any COVID-19 infection in the past-ID decide upon that  · Patient does not have any acute bronchospasm at this time  · ? Role of steroids  · Bronchodilators on whenever necessary basis  · Patient's echocardiogram shows patient to have cardiomyopathy with diastolic dysfunction and evaluation management as per IM/cardiology  · No clinical stigmata of any collagen vascular disorder  · Consider IV diuretics if deemed appropriate  · Monitor input output and BMP  · Correct electrolytes on whenever necessary basis  · Trend platelet counts and LFTs  · Interval medicine team to decide upon continuation of Lovenox on a twice daily basis  · Blood glucose monitoring with sliding scale insulin  · PUD prophylaxis as per IM    Case discussed with patient/nursing and case management  Case management requested to arrange for home oxygen for the patient        Electronically signed by:  Rina Barton MD    9/27/2021    11:25 AM.

## 2021-09-27 NOTE — PROGRESS NOTES
Infectious Disease Follow up Notes    CC :  Febrile illness     Antibiotics:   Azithromycin 500mg po daily, s 9/23/21      Admit Date:   9/19/2021  Hospital Day: 9    Subjective:   She has been afebrile ~72 hours now   Oxygen requirement increased, now 4L NC  She feels some pressure with deep breaths. Wet cough, not expectorating any sputum. No new concerns.     Objective:     Patient Vitals for the past 8 hrs:   BP Temp Temp src Pulse Resp SpO2   09/27/21 1216 107/66 97.7 °F (36.5 °C) Oral 61 16 94 %   09/27/21 0945 118/71 97.8 °F (36.6 °C) Oral 81 16 91 %   09/27/21 0541 -- -- -- -- -- 90 %   09/27/21 0540 -- -- -- -- -- 91 %   09/27/21 0520 -- -- -- -- -- 92 %   09/27/21 0515 120/69 97.7 °F (36.5 °C) -- 72 -- (!) 89 %   09/27/21 0514 -- -- Oral -- 16 --       EXAM:  General:  Much more alert, conversant, sitting in BSC   HEENT:  NCAT, PERRL, sclera anicteric   MMM, no thrush     NECK:  supple, no meningismus      LUNGS:  Breathing is non-labored  Upper lobes are clear    Basilar rales noted    CV: RRR without murmur     ABD: Soft, flat, NT   +BS    EXT: No focal rash  No LE edema  No stigmata of emboli           LINE: IV site ok         Scheduled Meds:   gabapentin  500 mg Oral Nightly    magnesium oxide  400 mg Oral BID    sodium chloride flush  5-40 mL IntraVENous 2 times per day    azithromycin  500 mg Oral Daily    metoprolol succinate  25 mg Oral Daily    aspirin  324 mg Oral Once    insulin lispro  0-12 Units SubCUTAneous TID WC    insulin lispro  0-6 Units SubCUTAneous Nightly    sodium chloride flush  5-40 mL IntraVENous 2 times per day    enoxaparin  30 mg SubCUTAneous BID    famotidine  20 mg Oral BID    Vitamin D  2,000 Units Oral Daily    aspirin  81 mg Oral Daily    atorvastatin  40 mg Oral Nightly    melatonin  5 mg Oral Nightly       Continuous Infusions:   sodium chloride      dextrose      sodium chloride            Data Review:    Lab Results   Component Value Date    WBC 7.3 09/25/2021    HGB 11.5 (L) 09/25/2021    HCT 35.9 (L) 09/25/2021    MCV 88.3 09/25/2021     (L) 09/25/2021     Lab Results   Component Value Date    CREATININE 0.7 09/26/2021    BUN 11 09/26/2021     (L) 09/26/2021    K 3.7 09/26/2021    CL 96 (L) 09/26/2021    CO2 33 (H) 09/26/2021       Hepatic Function Panel:   Lab Results   Component Value Date    ALKPHOS 49 09/23/2021    ALT 34 09/23/2021    AST 68 09/23/2021    PROT 5.9 09/23/2021    BILITOT 0.6 09/23/2021    BILIDIR <0.2 09/23/2021    IBILI see below 09/23/2021    LABALBU 2.8 09/23/2021         MICRO:  9/19     COVID PCR and NAAT neg               UA neg   9/21     COVID NAAT neg               BC x2 neg  9/24 Flu ag neg    Legionella, pneumococcal ag neg       IMAGING:    CT CAP 9/23/21  FINDINGS:       Chest:       Mediastinum: The heart size is normal.  There is no pericardial effusion. Aortic caliber is normal.  There may be a small hiatal hernia. Alondra Clement is no   adenopathy.       Lungs/pleura: There are tiny bilateral pleural effusions. Alondra Clement is no   pneumothorax.  The central airways are patent.  There are bilateral   ground-glass airspace opacities, most pronounced in the lingula and adjacent   left lower lobe spanning the major fissure.       Soft Tissues/Bones: No acute findings.           Abdomen/Pelvis:       Organs: The liver, spleen, pancreas, adrenal glands and kidneys are grossly   negative given the limitations of the noncontrast.       GI/Bowel: Gastric bypass is again noted.  Small bowel caliber is normal.  The   appendix is normal.  The colon is unremarkable.       Pelvis: The bladder is grossly negative.  The patient is status post   hysterectomy.       Peritoneum/Retroperitoneum: No adenopathy, mesenteric stranding or free   fluid.  Aortic caliber is normal.       Bones/Soft Tissues: No acute findings.      CXR 9/26/21  Impression   Persistent bilateral airspace disease, suspicious for pneumonia, decreased on   the left, but increased on the right. Assessment:     Patient Active Problem List    Diagnosis Date Noted    Acute respiratory failure with hypoxia ([address] 09/27/2021    SOB (shortness of breath) 09/26/2021    Pulmonary infiltrates 09/26/2021    Normocytic normochromic anemia 09/26/2021    Thrombocytopenia ([address] 09/26/2021    Elevated LFTs 09/26/2021    Cardiomyopathy ([facility] [address] 12/34/5622    Diastolic dysfunction 74/77/7569    Abnormal ECG     Aortic valve sclerosis     PNA (pneumonia) 09/19/2021    Pneumonia 09/19/2021    Closed fracture of proximal end of left humerus 12/13/2016    Coronary artery disease involving native coronary artery of native heart without angina pectoris 06/08/2016    PVC (premature ventricular contraction) 03/27/2015    Palpitations 03/27/2015    Gross hematuria 01/05/2015    Obstructive uropathy 01/05/2015    Chest pain 10/21/2014    Hypertension 10/21/2014    Hyperlipidemia 10/21/2014       Acute febrile illness, onset ~7-10d ago   Presenting symptoms - myalgia, malaise, weakness, headache     Leukopenia / lymphopenia - now resolving     TCP - now trending upward    Mild elevation of transaminases      Acute hypoxemic respiratory failure   Jj GGO on CT chest   COVID tests x3 negative, rapid flu negative  BC negative   Legionella, pneumococcal ags negative    Encephalopathy      Acute hypoxemic respiratory failure   Diffuse GGO on CT  ? Atypical CAP vs inflammatory vs other viral process  COVID tests x3 negative, rapid flu negative  BC negative   Legionella, pneumococcal ags negative  No history of immunocompromise to raise concern of PJP though not excluded     Allergy pcn  Intolerant of sulfa     Plan:   Fever has resolved  She has had 5 doses azithromycin - will DC  Encephalopathy has resolved.   She could not tolerate MRI but I would have no lingering suspicion of CNS infection at this point Was given dose of lasix today.   If she fails to respond with diuresis, might consider trial of steroids - per Pulm   She is insisting she will discharge tomorrow after threatening to leave today       Discussed with RN    I will sign off  Please call with further questions       Axel Beach MD  Phone: [phone]   Fax : [phone]

## 2021-09-27 NOTE — CARE COORDINATION
Chart reviewed. Spoke with Vivian FUENTES. Stated patient is thinking of leaving AMA. Dr Reji Noland requesting if she leaves to make sure she goes with O2. Testing in, [place] with [employer] notified to review for supportive documentation. Priscilla Dobbs RN      Spoke with Garrett Fuentes. Stated this patient is out of his delivery area. [facility] DME order obtained. Faxed supportive documentation to [facility]. waiting determination.  Priscilla Dobbs RN

## 2021-09-27 NOTE — PLAN OF CARE
Problem: Nutrition  Goal: Optimal nutrition therapy  Outcome: Ongoing  Note: Nutrition Problem #1: Inadequate oral intake  Intervention: Food and/or Nutrient Delivery: Continue Current Diet, Continue Oral Nutrition Supplement  Nutritional Goals: Pt will consume 50% or greater of meals and ONS during this admission

## 2021-09-27 NOTE — PROGRESS NOTES
Comprehensive Nutrition Assessment    Type and Reason for Visit:  Reassess    Nutrition Recommendations/Plan:   Continue carb control diet  Continue Glucerna ONS  Encourage nutrition   Monitor po intakes, nutrition adequacy, weights, pertinent labs, BMs    Nutrition Assessment:  Follow up: Pt currently on a carb control diet. PO intakes variable, 0% to % per EMR. Pt states that her appetite is okay. Pt did not wish to speak much further and states that she is leaving today. Will continue ONS and monitor. Malnutrition Assessment:  Malnutrition Status: At risk for malnutrition (Comment)      Estimated Daily Nutrient Needs:  Energy (kcal):  1917-3611; Weight Used for Energy Requirements:  Ideal (52.3kg)     Protein (g):  63-78; Weight Used for Protein Requirements:  Ideal (52.3kg; 1.2-1.5g/kg)        Fluid (ml/day):   1 ml/kcal      Nutrition Related Findings:  BM x 1 on 9/26      Wounds:  Skin Tears (skin tear to right elbow;)       Current Nutrition Therapies:    ADULT DIET; Regular; 4 carb choices (60 gm/meal)  Adult Oral Nutrition Supplement; Diabetic Oral Supplement    Anthropometric Measures:  · Height: 5' 3\" (160 cm)  · Current Body Weight: 165 lb (74.8 kg)   · Ideal Body Weight: 115 lbs  · BMI: 29.2  · BMI Categories: Overweight (BMI 25.0-29. 9)       Nutrition Diagnosis:   · Inadequate oral intake related to inadequate protein-energy intake as evidenced by poor intake prior to admission, intake 0-25%, intake 26-50%, intake 51-75%      Nutrition Interventions:   Food and/or Nutrient Delivery:  Continue Current Diet, Continue Oral Nutrition Supplement  Nutrition Education/Counseling:  Education not indicated   Coordination of Nutrition Care:  Continue to monitor while inpatient    Goals:  Pt will consume 50% or greater of meals and ONS during this admission       Nutrition Monitoring and Evaluation:   Behavioral-Environmental Outcomes:  None Identified   Food/Nutrient Intake Outcomes:  Food and Nutrient Intake, Supplement Intake  Physical Signs/Symptoms Outcomes:  Biochemical Data, Weight     Discharge Planning:    Continue current diet, Continue Oral Nutrition Supplement     Electronically signed by Rufina Kelley RD, LD on 9/27/21 at 10:46 AM EDT    Contact: [phone]

## 2021-09-28 VITALS
HEIGHT: 63 IN | RESPIRATION RATE: 18 BRPM | WEIGHT: 157 LBS | HEART RATE: 70 BPM | TEMPERATURE: 97.9 F | DIASTOLIC BLOOD PRESSURE: 56 MMHG | SYSTOLIC BLOOD PRESSURE: 147 MMHG | OXYGEN SATURATION: 97 % | BODY MASS INDEX: 27.82 KG/M2

## 2021-09-28 LAB
ANION GAP SERPL CALCULATED.3IONS-SCNC: 7 MMOL/L (ref 3–16)
BASOPHILS ABSOLUTE: 0 K/UL (ref 0–0.2)
BASOPHILS RELATIVE PERCENT: 0.3 %
BUN BLDV-MCNC: 10 MG/DL (ref 7–20)
CALCIUM SERPL-MCNC: 8.7 MG/DL (ref 8.3–10.6)
CHLORIDE BLD-SCNC: 98 MMOL/L (ref 99–110)
CO2: 31 MMOL/L (ref 21–32)
CREAT SERPL-MCNC: 0.8 MG/DL (ref 0.6–1.2)
EOSINOPHILS ABSOLUTE: 0.1 K/UL (ref 0–0.6)
EOSINOPHILS RELATIVE PERCENT: 1.7 %
GFR AFRICAN AMERICAN: >60
GFR NON-AFRICAN AMERICAN: >60
GLUCOSE BLD-MCNC: 109 MG/DL (ref 70–99)
GLUCOSE BLD-MCNC: 125 MG/DL (ref 70–99)
GLUCOSE BLD-MCNC: 129 MG/DL (ref 70–99)
HCT VFR BLD CALC: 34.6 % (ref 36–48)
HEMOGLOBIN: 11 G/DL (ref 12–16)
LYMPHOCYTES ABSOLUTE: 0.4 K/UL (ref 1–5.1)
LYMPHOCYTES RELATIVE PERCENT: 13 %
MCH RBC QN AUTO: 28.4 PG (ref 26–34)
MCHC RBC AUTO-ENTMCNC: 31.8 G/DL (ref 31–36)
MCV RBC AUTO: 89.3 FL (ref 80–100)
MONOCYTES ABSOLUTE: 0.2 K/UL (ref 0–1.3)
MONOCYTES RELATIVE PERCENT: 7.3 %
NEUTROPHILS ABSOLUTE: 2.7 K/UL (ref 1.7–7.7)
NEUTROPHILS RELATIVE PERCENT: 77.7 %
PDW BLD-RTO: 22.7 % (ref 12.4–15.4)
PERFORMED ON: ABNORMAL
PERFORMED ON: ABNORMAL
PLATELET # BLD: 192 K/UL (ref 135–450)
PMV BLD AUTO: 7.8 FL (ref 5–10.5)
POTASSIUM REFLEX MAGNESIUM: 4.2 MMOL/L (ref 3.5–5.1)
PRO-BNP: 4348 PG/ML (ref 0–124)
RBC # BLD: 3.88 M/UL (ref 4–5.2)
SODIUM BLD-SCNC: 136 MMOL/L (ref 136–145)
WBC # BLD: 3.4 K/UL (ref 4–11)

## 2021-09-28 PROCEDURE — 36415 COLL VENOUS BLD VENIPUNCTURE: CPT

## 2021-09-28 PROCEDURE — 6370000000 HC RX 637 (ALT 250 FOR IP): Performed by: NURSE PRACTITIONER

## 2021-09-28 PROCEDURE — 85025 COMPLETE CBC W/AUTO DIFF WBC: CPT

## 2021-09-28 PROCEDURE — 6360000002 HC RX W HCPCS: Performed by: NURSE PRACTITIONER

## 2021-09-28 PROCEDURE — 6370000000 HC RX 637 (ALT 250 FOR IP): Performed by: INTERNAL MEDICINE

## 2021-09-28 PROCEDURE — 80048 BASIC METABOLIC PNL TOTAL CA: CPT

## 2021-09-28 PROCEDURE — 83880 ASSAY OF NATRIURETIC PEPTIDE: CPT

## 2021-09-28 PROCEDURE — 99233 SBSQ HOSP IP/OBS HIGH 50: CPT | Performed by: NURSE PRACTITIONER

## 2021-09-28 PROCEDURE — 99232 SBSQ HOSP IP/OBS MODERATE 35: CPT | Performed by: HOSPITALIST

## 2021-09-28 RX ORDER — FUROSEMIDE 40 MG/1
TABLET ORAL
Qty: 60 TABLET | Refills: 1 | Status: SHIPPED | OUTPATIENT
Start: 2021-09-28 | End: 2021-09-28 | Stop reason: SDUPTHER

## 2021-09-28 RX ORDER — METOPROLOL SUCCINATE 50 MG/1
50 TABLET, EXTENDED RELEASE ORAL DAILY
Qty: 30 TABLET | Refills: 1 | Status: SHIPPED | OUTPATIENT
Start: 2021-09-29 | End: 2021-12-03

## 2021-09-28 RX ORDER — ASPIRIN 81 MG/1
81 TABLET, CHEWABLE ORAL DAILY
Qty: 30 TABLET | Refills: 1 | Status: SHIPPED | OUTPATIENT
Start: 2021-09-28

## 2021-09-28 RX ORDER — LISINOPRIL 5 MG/1
5 TABLET ORAL DAILY
Qty: 30 TABLET | Refills: 1 | Status: SHIPPED | OUTPATIENT
Start: 2021-09-28 | End: 2021-09-28 | Stop reason: SDUPTHER

## 2021-09-28 RX ORDER — METOPROLOL SUCCINATE 50 MG/1
50 TABLET, EXTENDED RELEASE ORAL DAILY
Qty: 30 TABLET | Refills: 3 | Status: SHIPPED | OUTPATIENT
Start: 2021-09-29 | End: 2021-09-28

## 2021-09-28 RX ORDER — FUROSEMIDE 10 MG/ML
40 INJECTION INTRAMUSCULAR; INTRAVENOUS ONCE
Status: COMPLETED | OUTPATIENT
Start: 2021-09-28 | End: 2021-09-28

## 2021-09-28 RX ORDER — SIMVASTATIN 20 MG
20 TABLET ORAL NIGHTLY
Qty: 30 TABLET | Refills: 1 | Status: SHIPPED | OUTPATIENT
Start: 2021-09-28

## 2021-09-28 RX ORDER — FUROSEMIDE 40 MG/1
TABLET ORAL
Qty: 45 TABLET | Refills: 1 | Status: ON HOLD | OUTPATIENT
Start: 2021-09-28 | End: 2021-09-30

## 2021-09-28 RX ORDER — LISINOPRIL 5 MG/1
5 TABLET ORAL DAILY
Qty: 30 TABLET | Refills: 1 | Status: ON HOLD | OUTPATIENT
Start: 2021-09-28 | End: 2021-10-04 | Stop reason: HOSPADM

## 2021-09-28 RX ADMIN — MAGNESIUM GLUCONATE 500 MG ORAL TABLET 400 MG: 500 TABLET ORAL at 08:43

## 2021-09-28 RX ADMIN — ASPIRIN 81 MG: 81 TABLET, CHEWABLE ORAL at 08:43

## 2021-09-28 RX ADMIN — Medication 2000 UNITS: at 08:41

## 2021-09-28 RX ADMIN — FUROSEMIDE 40 MG: 10 INJECTION, SOLUTION INTRAMUSCULAR; INTRAVENOUS at 13:23

## 2021-09-28 RX ADMIN — FAMOTIDINE 20 MG: 20 TABLET ORAL at 08:42

## 2021-09-28 RX ADMIN — METOPROLOL SUCCINATE 50 MG: 50 TABLET, EXTENDED RELEASE ORAL at 08:42

## 2021-09-28 ASSESSMENT — PAIN SCALES - GENERAL: PAINLEVEL_OUTOF10: 0

## 2021-09-28 NOTE — PROGRESS NOTES
[facility] 79   Daily Progress Note    Admit Date:  9/19/2021  HPI:    Chief Complaint   Patient presents with    Shortness of Breath     patient c/o shortness of breath when she woke up this morning. states her family is COVID positive, patient has not been tested.  Hip Pain     multiple falls over the past three days. another fall this morning, right hip pain. 100mcg Fentanyl given by EMS in route. Andrés Betancourt admitted 9/19/2021 for shortness of breath. Cardiology consulted for new LBBB. Echo showed EF 30-35% with anterior wall motion abnormalities. She has also been treated for metabolic acidosis, pneumonia, THEO. Subjective:  Ms. Tab Wagner lying in bed, insistent on going home today, getting restless. Denies any chest pain. Denies shortness of breath. O2 decreased to 3 lpm this am.     Objective:   Patient Vitals for the past 24 hrs:   BP Temp Temp src Pulse Resp SpO2 Weight   09/28/21 1204 (!) 147/56 97.9 °F (36.6 °C) Oral 70 18 97 % --   09/28/21 1130 -- -- -- -- -- 92 % --   09/28/21 0837 -- -- -- -- -- 93 % --   09/28/21 0830 128/65 98.4 °F (36.9 °C) Oral 70 16 95 % --   09/28/21 0454 116/64 97.6 °F (36.4 °C) Oral 62 16 91 % --   09/28/21 0452 -- -- -- -- -- (!) 83 % --   09/28/21 0443 -- -- -- -- -- -- 157 lb (71.2 kg)   09/27/21 2005 123/73 98 °F (36.7 °C) Oral 74 16 93 % --   09/27/21 1606 128/78 97.9 °F (36.6 °C) Oral 69 16 94 % --   09/27/21 1216 107/66 97.7 °F (36.5 °C) Oral 61 16 94 % --       Intake/Output Summary (Last 24 hours) at 9/28/2021 1209  Last data filed at 9/28/2021 0443  Gross per 24 hour   Intake 120 ml   Output 800 ml   Net -680 ml     Wt Readings from Last 3 Encounters:   09/28/21 157 lb (71.2 kg)   06/17/19 120 lb 8 oz (54.7 kg)   04/19/18 130 lb (59 kg)         ASSESSMENT:   1. LBBB (new):    2. Cardiomyopathy: EF 30-35% by echo, + wall motion abnormalities  3.  CAD: Mild by [facility] 2014, was scheduled for C with Dr. Madelin Aguila on 9/25/21 but was missed since here, will need to reschedule  4. CHF, acute systolic: net neg 1 liter, weight down ~ 3 lbs, breath sounds and oxygenation improved  5. Pneumonia: per IM/ pulm/ ID  6. Metabolic acidosis  7. THEO: stable  8. HYPERTENSION: stable  9. Diabetes mellitus      PLAN:  1. Volume status and oxygenation improved. Repeat IV Lasix 40 mg today then discharge home on Lasix 40 mg bid for 3 days then 40 mg daily  2. Continue metoprolol (Toprol XL) 50 mg daily  3. Restart lisinopril 5 mg daily, stop hydrochlorothiazide   4. Needs cardiac catheterization for evaluation of coronary artery disease with likely progression - patient wishes to follow up with primary cardiologist  5. Continue aspirin and statin for CAD  6. Okay for discharge from cardiac perspective. Reviewed cardiac medications on discharge medication reconciliation form. Will help arrange follow up appointment with Dr. Alec Lopes in 1 weeks.       Jessica Quiles, APRN - CNP, 9/28/2021, 12:09 PM  [facility] [place]   [phone]       Telemetry: SR 60-70's  NYHA: III    Physical Exam:  General:  Awake, alert, NAD  Skin:  Warm and dry  Neck:  JVP 8 cm upright  Chest:  Right Clear to auscultation, left with rales 1/2 up  Cardiovascular:  RRR, normal S1S2, + murmur, no g/r  Abdomen:  Soft, nontender, +bowel sounds  Extremities:  No BLE edema      Medications:    furosemide  40 mg IntraVENous Once    metoprolol succinate  50 mg Oral Daily    gabapentin  500 mg Oral Nightly    magnesium oxide  400 mg Oral BID    sodium chloride flush  5-40 mL IntraVENous 2 times per day    aspirin  324 mg Oral Once    insulin lispro  0-12 Units SubCUTAneous TID     insulin lispro  0-6 Units SubCUTAneous Nightly    sodium chloride flush  5-40 mL IntraVENous 2 times per day    enoxaparin  30 mg SubCUTAneous BID    famotidine  20 mg Oral BID    Vitamin D  2,000 Units Oral Daily    aspirin  81 mg Oral Daily    atorvastatin  40 mg Oral Nightly    melatonin  5 mg Oral Nightly  sodium chloride      dextrose      sodium chloride         Lab Data: Lab results independently reviewed and analyzed by myself 9/27/21   CBC:   Recent Labs     09/28/21  0522   WBC 3.4*   HGB 11.0*        BMP:    Recent Labs     09/26/21  0846 09/28/21  0522   * 136   K 3.7 4.2   CO2 33* 31   BUN 11 10   CREATININE 0.7 0.8     INR:  No results for input(s): INR in the last 72 hours. BNP:    Recent Labs     09/28/21  0522   PROBNP 4,348*     Cardiac Enzymes:   Recent Labs     09/26/21  1136 09/26/21  1739   TROPONINI 0.02* 0.02*     Lipids:   Lab Results   Component Value Date    TRIG 113 12/11/2015    TRIG 84 10/30/2014    HDL 57 12/11/2015    HDL 70 10/30/2014    LDLCALC 59 12/11/2015    LDLCALC 77 10/30/2014       Cardiac Imaging:    Echo 9/22/2021:  The left ventricular systolic function is moderately reduced with an   ejection fraction of 30-35%. There is hypokinesis of the apex, apical lateral, apical septum, anterior,   anteroseptum and apical anterior walls. Grade I diastolic dysfunction with normal filling pressure. Changes noted from previous echo on 6- in left ventricular function. Mild mitral regurgitation. Echo 2018:  Normal left ventricle systolic function with an estimated ejection fraction   of 55%. No regional wall motion abnormalities are seen. Grade I diastolic dysfunction with normal filing pressure. The non-coronary cusp of the aortic valve is thickened/calcified with   decreased leaflet mobility. No aortic stenosis noted. Mild pulmonic regurgitation. Mild tricuspid regurgitation. Systolic pulmonary artery pressure (SPAP) is normal and estimated at 24 mmHg   (right atrial pressure 3 mmHg). Coronary angiogram 10/2014:  LM, LAD, LCX, RCA with no significant CAD (RCA with <10%)  Significant kinking and tortousity of vessel  LVEDP 5  LVEF 65%  PLAN  1.  No significant CAD (only <10% in prox RCA)  2. CP likley from HTn and stress, possibly from coronary kinking.

## 2021-09-28 NOTE — PROGRESS NOTES
Pt arrived to A1 . Pt high fall risk. Instructed to use call light before getting out of bed. Call light within reach. Bed in low position. Bed alarm on. Placed on 3LPM via nasal cannula. SPO2- 93%. Vitals:    09/28/21 0454 09/28/21 0830 09/28/21 0837 09/28/21 1130   BP: 116/64 128/65     Pulse: 62 70     Resp: 16 16     Temp: 97.6 °F (36.4 °C) 98.4 °F (36.9 °C)     TempSrc: Oral Oral     SpO2: 91% 95% 93% 92%   Weight:       Height:         Will continue to monitor.

## 2021-09-28 NOTE — CARE COORDINATION
CASE MANAGEMENT DISCHARGE SUMMARY      Discharge to: Home with new DME, no home care. IMM given: 9/28    New Durable Medical Equipment ordered/agency: [employer] brought portable oxygen tank and wheeled walker to pt.   Per [employer]'s instructions, pt to call [facility] when leaving hospital.      Transportation:    Family/car: daughter at bedside    Confirmed discharge plan with:   Patient: yes  DINORA Gates-RN

## 2021-09-28 NOTE — PROGRESS NOTES
Hospitalist Progress Note      PCP: Selene Can    Date of Admission: 9/19/2021    Chief Complaint: C/O concerns for COVID and mild chest pain     Hospital Course:   66 y.o. female who presented to [facility] with feelings of sob, cough, fatigue. Malaise  and mild chest pain. PT is vaccinated and her family has + covid currently. She is concerned that she is covid + as well.      Pt found to have mild THEO, CXR demonstrated small left pna. VSS. No hypoxia   ECG demonstrated New LBBB      Pt will be admitted for further evalaution and treatment     Subjective: Patient seen and examined with her daughter at bedside. Had a lengthy > 30 min conversation with the patient as she was requesting to be discharged or \"would want to leave AMA \" . D/w patient regarding her diagnosis including worsening acute hypoxic respiratory failure secondary to pneumonia/CHF for which she is being treated with antibiotics and IV Lasix. Encourage patient to continue IV diuresis for at least 1 more day and do I-S in order to decrease oxygen needs. Noted ambulatory oxygen evaluation done by RN. Ordered home oxygen at discharge per pulmonology.    -Patient agreed to stay.   Continue current management    Medications:  Reviewed    Infusion Medications    sodium chloride      dextrose      sodium chloride       Scheduled Medications    [START ON 9/28/2021] metoprolol succinate  50 mg Oral Daily    gabapentin  500 mg Oral Nightly    magnesium oxide  400 mg Oral BID    sodium chloride flush  5-40 mL IntraVENous 2 times per day    aspirin  324 mg Oral Once    insulin lispro  0-12 Units SubCUTAneous TID WC    insulin lispro  0-6 Units SubCUTAneous Nightly    sodium chloride flush  5-40 mL IntraVENous 2 times per day    enoxaparin  30 mg SubCUTAneous BID    famotidine  20 mg Oral BID    Vitamin D  2,000 Units Oral Daily    aspirin  81 mg Oral Daily    atorvastatin  40 mg Oral Nightly    melatonin  5 mg Oral Nightly PRN Meds: albuterol sulfate HFA, sodium chloride, perflutren lipid microspheres, glucose, dextrose, glucagon (rDNA), dextrose, sodium chloride flush, ondansetron **OR** ondansetron, polyethylene glycol, guaiFENesin-dextromethorphan, sodium chloride, acetaminophen **OR** acetaminophen, cyclobenzaprine      Intake/Output Summary (Last 24 hours) at 9/27/2021 2042  Last data filed at 9/27/2021 1838  Gross per 24 hour   Intake 120 ml   Output 800 ml   Net -680 ml       Physical Exam Performed:    /73   Pulse 74   Temp 98 °F (36.7 °C) (Oral)   Resp 16   Ht 5' 3\" (1.6 m)   Wt 165 lb (74.8 kg)   SpO2 93%   BMI 29.23 kg/m²     General appearance: NAD  HEENT: . Conjunctivae/corneas clear. Neck: Supple, with full range of motion. No jugular venous distention. Trachea midline. Respiratory:  Normal respiratory effort. Reduced air entry, bilaterally without Rales/Wheezes/Rhonchi. Cardiovascular: Regular rate and rhythm with normal S1/S2 without murmurs, rubs or gallops. Abdomen: Soft, non-tender, non-distended with normal bowel sounds. Musculoskeletal: No clubbing, cyanosis or edema bilaterally. Full range of motion without deformity. Skin: Skin color, texture, turgor normal.  No rashes or lesions. Neurologic:  Neurovascularly intact without any focal sensory/motor deficits. Mental status altered-slow to respond and difficult to comprehend  Psychiatric: Awake, confused/forgetful/slow to response,   Capillary Refill: Brisk,3 seconds, normal   Peripheral Pulses: +2 palpable, equal bilaterally       Labs:   Recent Labs     09/25/21  0617   WBC 7.3   HGB 11.5*   HCT 35.9*   *     Recent Labs     09/25/21  0617 09/26/21  0846   * 135*   K 4.1 3.7    96*   CO2 20* 33*   BUN 13 11   CREATININE 0.7 0.7   CALCIUM 8.0* 7.8*     No results for input(s): INR in the last 72 hours.   Recent Labs     09/26/21  1136 09/26/21  1739   TROPONINI 0.02* 0.02*       Urinalysis:   Lab Results   Component Value Date    NITRU Negative 09/19/2021    WBCUA 0-2 09/19/2021    RBCUA 0-2 09/19/2021    BLOODU TRACE-INTACT 09/19/2021    SPECGRAV 1.010 09/19/2021    GLUCOSEU >=1000 09/19/2021       Radiology:  XR CHEST 1 VIEW   Final Result   Persistent bilateral airspace disease, suspicious for pneumonia, decreased on   the left, but increased on the right. XR CHEST 1 VIEW   Final Result   Progressive bilateral airspace disease, remaining consistent with pneumonia. Small right pleural effusion. CT CHEST ABDOMEN PELVIS WO CONTRAST   Final Result   1. Commonly reported imaging features of COVID-19 pneumonia are present. Other processes such as influenza pneumonia and organizing pneumonia, as can   be seen with drug toxicity and connective tissue disease, can cause a similar   imaging pattern. PneTyp   2. No acute findings in the abdomen and pelvis. XR CHEST PORTABLE   Final Result   Increasing airspace opacity left lower lobe         XR CHEST PORTABLE   Final Result   Small left basilar pneumonia. XR HIP RIGHT (2-3 VIEWS)   Final Result   No acute finding of the right hip.                  Assessment/Plan:    Active Hospital Problems    Diagnosis     Acute respiratory failure with hypoxia ([address] [J96.01]     THEO (acute kidney injury) ([address] [N17.9]     SOB (shortness of breath) [R06.02]     Pulmonary infiltrates [R91.8]     Normocytic normochromic anemia [D64.9]     Thrombocytopenia (HCC) [D69.6]     Elevated LFTs [R79.89]     Cardiomyopathy ([address] [T88.2]     Diastolic dysfunction [I29.39]     Abnormal ECG [R94.31]     Aortic valve sclerosis [I35.8]     PNA (pneumonia) [J18.9]     Pneumonia [J18.9]     Coronary artery disease involving native coronary artery of native heart without angina pectoris [I25.10]     PVC (premature ventricular contraction) [I49.3]     Hypertension [I10]     Hyperlipidemia [E78.5]     Chest pain [R07.9]      67 yo female vaccinated presents with mild chest pain, fatigue, sob, general aches and pains pt concerned for COVID    COVID -19-ruled out rapid and PCR are negative  PNA-left base, -worsening with acute hypoxic respiratory failure and fever POA   -currently on oxygen supplementation 4 L/min, initially treated with empiric Vanco and cefepime  -Given persistent fevers - ID consulted -added Zithromax 9/23 ; DC'd Vanco and Maxipime  Given ongoing fever and confusion-MRI of the brain with and without contrast attempted on 9/24/2021 but patient would not tolerate due to claustrophobia in spite of Ativan trial.  -Mentation improved. Early sepsis POA-secondary to above ; improved    Asthma: Exacerbation-resolved s/p steroid     THEO: pre renal, low volume, poor appetite last few days and was taking BP meds  ACE. HCTZ combo, hold for now   Repeat BMP -improved     HTN: controlled: cont BB only for now      DM: Well controlled   - hold orals, SSI while IP, titrate as needed   -HbA1c 6.4% dated 9/19/2021     Mild chest pain- low suspicion of ACS, however does have CAD hx and new LBBB on ECG. -Serial troponin flat at 0.02  -Cardiology consulted -following and assisting. Mild thrombocytopenia-possibly secondary to sepsis -monitor     HLD- cont statin     A -a gap acidosis / hyperkalemia - s/p hco3 infusion, [facility] nephrology input appreciated - resolved with low bicarb-sodium bicarb infusion I       DVT Prophylaxis: Lovenox  Diet: ADULT DIET;  Regular; 4 carb choices (60 gm/meal)  Adult Oral Nutrition Supplement; Diabetic Oral Supplement  Code Status: Full Code    PT/OT Eval Status: Input appreciated,     Dispo - continue above treatment plan, wean o2 as tolerated, continue IV diuresis today, likely tomorrow    Emily Hernandez MD

## 2021-09-28 NOTE — PROGRESS NOTES
Oxygen documentation:    1. O2 saturation at REST on ROOM AIR =88 %    If saturation is 89% or above please proceed with steps 2 and 3. 2. O2 saturation with AMBULATION of _____ feet on ROOM AIR = _____%  3.  O2 saturation with AMBULATION on _______ liter/min = ______%    DCP notified: yes

## 2021-09-28 NOTE — PROGRESS NOTES
Nephrology Progress  Note                                                                                                                                                                                                                                                                                                                                                               Office : [phone]     Fax :[phone]              Patient's Name: Anastasia Ennis  2:07 PM  9/28/2021    Reason for Consult:  Metabolic acidosis , hyperkalemia   Requesting Physician:  Yousuf Epperson      Chief Complaint:  Chest pain       Interval History :      Sitting in chair , gets SOB on exertion   C XR s/o PNA , small pleural effusion  On 3 - 5  L O2   Neutropenia : improved   BP soft  No diarrhea  No sob  No chest pain  No fever          History of Present Ilness:    Anastasia Ennis is a 66 y.o. female with PMH of CAD  who presented to [facility] with feelings of sob, cough, fatigue. Malaise  and mild chest pain.       Pt found to have mild THEO, CXR demonstrated small left pna. VSS.  No hypoxia   ECG demonstrated New LBBB       Nephrology consult for management of hyperkalemia and metabolic acidosis       Past Medical History:   Diagnosis Date    Arthritis     Asthma     Diabetes mellitus ([address]     type 2, takes PO meds    History of palpitations     Hyperlipidemia     Hypertension        Past Surgical History:   Procedure Laterality Date    CATARACT REMOVAL WITH IMPLANT Right 10/18/2017    entered to epic from h&P    CATARACT REMOVAL WITH IMPLANT Left 11/08/2017    CHOLECYSTECTOMY      COLONOSCOPY      CORONARY ANGIOPLASTY  10/30/14    CYST REMOVAL      from right wrist     ENDOSCOPY, COLON, DIAGNOSTIC      GASTRIC BYPASS SURGERY  2005    HERNIA REPAIR      HYSTERECTOMY      age 30    OTHER SURGICAL HISTORY  CYSTOSCOPY, RIGHT URETERAL STONE MANIPULATION WITH RIGHT    SHOULDER ARTHROPLASTY Left 12/15/2016 LEFT PROXIMAL HUMERUS OPEN REDUCTION INTERNAL FIXATION     TONSILLECTOMY         Family History   Problem Relation Age of Onset    Heart Attack Maternal Grandmother         reports that she has never smoked. She has never used smokeless tobacco. She reports that she does not drink alcohol and does not use drugs.     Allergies:  Morphine, Sulfamethoxazole, Trimethoprim, Alendronate, Bactrim [sulfamethoxazole-trimethoprim], Buspirone, Calcitonin (salmon), Demerol hcl [meperidine], Dust mite extract, Esomeprazole magnesium, Glipizide, Metformin, Mometasone, Omeprazole, Other, Oxycodone, Oxycodone-acetaminophen, Pcn [penicillins], Percocet [oxycodone-acetaminophen], Prednisone, Propoxyphene, Ranitidine, Sulfa antibiotics, Toradol [ketorolac tromethamine], Tramadol, and Zantac [ranitidine hcl]    Current Medications:    metoprolol succinate (TOPROL XL) extended release tablet 50 mg, Daily  gabapentin (NEURONTIN) capsule 500 mg, Nightly  magnesium oxide (MAG-OX) tablet 400 mg, BID  albuterol sulfate  (90 Base) MCG/ACT inhaler 2 puff, Q4H PRN  sodium chloride flush 0.9 % injection 5-40 mL, 2 times per day  0.9 % sodium chloride infusion, PRN  perflutren lipid microspheres (DEFINITY) injection 1.65 mg, ONCE PRN  aspirin chewable tablet 324 mg, Once  glucose (GLUTOSE) 40 % oral gel 15 g, PRN  dextrose 50 % IV solution, PRN  glucagon (rDNA) injection 1 mg, PRN  dextrose 5 % solution, PRN  insulin lispro (HUMALOG) injection vial 0-12 Units, TID WC  insulin lispro (HUMALOG) injection vial 0-6 Units, Nightly  sodium chloride flush 0.9 % injection 5-40 mL, 2 times per day  sodium chloride flush 0.9 % injection 5-40 mL, PRN  enoxaparin (LOVENOX) injection 30 mg, BID  ondansetron (ZOFRAN-ODT) disintegrating tablet 4 mg, Q8H PRN   Or  ondansetron (ZOFRAN) injection 4 mg, Q6H PRN  polyethylene glycol (GLYCOLAX) packet 17 g, Daily PRN  famotidine (PEPCID) tablet 20 mg, BID  guaiFENesin-dextromethorphan (ROBITUSSIN DM) 100-10 MG/5ML syrup 5 mL, Q4H PRN  Vitamin D (CHOLECALCIFEROL) tablet 2,000 Units, Daily  0.9 % sodium chloride infusion, PRN  acetaminophen (TYLENOL) tablet 650 mg, Q6H PRN   Or  acetaminophen (TYLENOL) suppository 650 mg, Q6H PRN  aspirin chewable tablet 81 mg, Daily  atorvastatin (LIPITOR) tablet 40 mg, Nightly  melatonin disintegrating tablet 5 mg, Nightly  cyclobenzaprine (FLEXERIL) tablet 10 mg, TID PRN        Review of Systems:   14 point ROS obtained but were negative except mentioned in HPI      Physical exam:     Vitals:  BP (!) 147/56   Pulse 70   Temp 97.9 °F (36.6 °C) (Oral)   Resp 18   Ht 5' 3\" (1.6 m)   Wt 157 lb (71.2 kg)   SpO2 97%   BMI 27.81 kg/m²   Constitutional:  OAA X3 NAD  Skin: no rash, turgor wnl  Heent:  eomi, mmm  Neck: no bruits or jvd noted  Cardiovascular:  S1, S2 without m/r/g  Respiratory: CTA B without w/r/r  Abdomen:  +bs, soft, nt, nd  Ext: no  lower extremity edema  Psychiatric: mood and affect appropriate  Musculoskeletal:  Rom, muscular strength intact    Data:   Labs:  CBC:   Recent Labs     09/28/21  0522   WBC 3.4*   HGB 11.0*        BMP:    Recent Labs     09/26/21  0846 09/28/21  0522   * 136   K 3.7 4.2   CL 96* 98*   CO2 33* 31   BUN 11 10   CREATININE 0.7 0.8   GLUCOSE 208* 129*     Ca/Mg/Phos:   Recent Labs     09/26/21  0846 09/28/21  0522   CALCIUM 7.8* 8.7     Hepatic:   No results for input(s): AST, ALT, ALB, BILITOT, ALKPHOS in the last 72 hours. Troponin:   Recent Labs     09/26/21  1136 09/26/21  1739   TROPONINI 0.02* 0.02*     BNP: No results for input(s): BNP in the last 72 hours. Lipids: No results for input(s): CHOL, TRIG, HDL, LDLCALC, LABVLDL in the last 72 hours. ABGs:   Recent Labs     09/26/21  1308   PHART 7.526*   PO2ART 71.2*   XPP0QZC 45.2*     INR: No results for input(s): INR in the last 72 hours.   UA:  Recent Labs     09/26/21  0405   PHUR 7.0      Urine Microscopic:   No results for input(s): LABCAST, BACTERIA, COMU, HYALCAST, WBCUA, RBCUA, EPIU in the last 72 hours. Urine Culture:   No results for input(s): LABURIN in the last 72 hours. Urine Chemistry: No results for input(s): Lacretia All, PROTEINUR, NAUR in the last 72 hours. IMAGING:  XR CHEST 1 VIEW   Final Result   Persistent bilateral airspace disease, suspicious for pneumonia, decreased on   the left, but increased on the right. XR CHEST 1 VIEW   Final Result   Progressive bilateral airspace disease, remaining consistent with pneumonia. Small right pleural effusion. CT CHEST ABDOMEN PELVIS WO CONTRAST   Final Result   1. Commonly reported imaging features of COVID-19 pneumonia are present. Other processes such as influenza pneumonia and organizing pneumonia, as can   be seen with drug toxicity and connective tissue disease, can cause a similar   imaging pattern. PneTyp   2. No acute findings in the abdomen and pelvis. XR CHEST PORTABLE   Final Result   Increasing airspace opacity left lower lobe         XR CHEST PORTABLE   Final Result   Small left basilar pneumonia. XR HIP RIGHT (2-3 VIEWS)   Final Result   No acute finding of the right hip. Assessment/Plan     1. AGMA    Anion Gap :   15   -----> 17  ----> 14    Serum Bicarb : 19   ------> 15 -----> 18 ----> 20    Denies diarrhea   Denies vomiting    S/p IVF  NS  On 9/19 , 9/20     Plan      DC IVF     Encourage PO intake of fluids to thirst    Avoid volume overload    Lasix prn for volume overload    Hold BP medicine                   2. Hyperkalemia in setting of metabolic acidosis     F/u BMP      S/p lokelma     Serum K   5.1 ----> 5.7 ------> 4.9     Avoid ACEI or ARB    Low K diet     3 THEO    Serum cr improved from 1.4 to 1.2   ---> 0.7    Avoid contrast    Avoid ACEI OR ARB      3. CAD    4.  Aortic sclerosis     5 HTN    6 DLP     7 Acute hypoxic respiratory failure     O2    Lasix 40 mg iv x 1 on 9/27                Thank you for allowing us to participate in care of Terri Giang MD  Feel free to contact me   [facility] [address]  Office : [phone]  Fax :[phone]

## 2021-09-28 NOTE — DISCHARGE SUMMARY
Hospital Medicine Discharge Summary    Patient ID: Luis Guerrero      Patient's PCP: Chris Guillaume Date: 9/19/2021     Discharge Date:  9/28/2021    Admitting Physician: Pat Love MD     Discharge Physician: Jamin Quesada MD     Discharge Diagnoses: Active Hospital Problems    Diagnosis     Acute systolic congestive heart failure (HCC) [I50.21]     Acute respiratory failure with hypoxia ([address] [J96.01]     THEO (acute kidney injury) ([address] [N17.9]     SOB (shortness of breath) [R06.02]     Pulmonary infiltrates [R91.8]     Normocytic normochromic anemia [D64.9]     Thrombocytopenia (HCC) [D69.6]     Elevated LFTs [R79.89]     Cardiomyopathy ([address] [L88.3]     Diastolic dysfunction [Y83.76]     Abnormal ECG [R94.31]     Aortic valve sclerosis [I35.8]     PNA (pneumonia) [J18.9]     Pneumonia [J18.9]     Coronary artery disease involving native coronary artery of native heart without angina pectoris [I25.10]     PVC (premature ventricular contraction) [I49.3]     Hypertension [I10]     Hyperlipidemia [E78.5]     Chest pain [R07.9]        The patient was seen and examined on day of discharge and this discharge summary is in conjunction with any daily progress note from day of discharge. Hospital Course: 66 y. o. female who presented to [facility] with feelings of sob, cough, fatigue. Malaise  and mild chest pain.  PT is vaccinated and her family has + covid currently. She is concerned that she is covid + as well.      Pt found to have mild THEO, CXR demonstrated small left pna. VSS.  No hypoxia   ECG demonstrated New LBBB      Pt   admitted for further evalaution and treatment  as follows     By Problem List   65 yo female vaccinated presents with mild chest pain, fatigue, sob, general aches and pains pt concerned for COVID    rapid COVID -19 and PCR are negative ; COVID-19 infection ruled out  PNA-left base, -worsening with acute hypoxic respiratory failure and fever POA   -currently on oxygen supplementation 4 L/min, initially treated with empiric Vanco and cefepime  -Given persistent fevers - ID consulted -added Zithromax 9/23 ; DC'd Vanco and Maxipime . Completed antibiotic course. Given ongoing fever and confusion-MRI of the brain with and without contrast attempted on 9/24/2021 but patient would not tolerate due to claustrophobia in spite of Ativan trial.  -Mentation improved. Early sepsis POA-secondary to above ; improved     Asthma: Exacerbation-resolved s/p steroid     THEO: pre renal, low volume, poor appetite last few days and was taking BP meds  ACE. HCTZ combo, hold for now   Repeat BMP -improved     HTN: controlled: cont BB only for now      DM: Well controlled   - hold orals, SSI while IP, titrate as needed   -HbA1c 6.4% dated 9/19/2021     Mild chest pain- low suspicion of ACS, however does have CAD hx and new LBBB on ECG. -Serial troponin flat at 0.02  -Cardiology consulted -recommended cardiac catheterization for evaluation of coronary artery disease with likely progression -patient wishes to follow-up with her primary cardiologist Dr. Sheila Vazquez in 1 weeks     Mild thrombocytopenia-possibly secondary to sepsis -monitored      HLD- cont statin      A -a gap acidosis / hyperkalemia - s/p hco3 infusion, [facility] nephrology input appreciated - resolved with low bicarb-sodium bicarb infusion I       Had a lengthy > 30 min conversation with the patient on 9/27/2021 as she was requesting to be discharged or \"would want to leave AMA \" . D/w patient regarding her diagnosis including worsening acute hypoxic respiratory failure secondary to pneumonia/CHF for which she is being treated with antibiotics and IV Lasix . She got discharged home in stable medical condition after arranging home O2.       Physical Exam Performed:   BP (!) 147/56   Pulse 70   Temp 97.9 °F (36.6 °C) (Oral)   Resp 18   Ht 5' 3\" (1.6 m)   Wt 157 lb (71.2 kg)   SpO2 97%   BMI 27.81 kg/m²     General appearance: NAD . Oxygen by nasal cannula liters per minute  HEENT: . Conjunctivae/corneas clear. Neck: Supple, with full range of motion. No jugular venous distention. Trachea midline. Respiratory:  Normal respiratory effort. Reduced air entry, bilaterally without Rales/Wheezes/Rhonchi. Cardiovascular: Regular rate and rhythm with normal S1/S2 without murmurs, rubs or gallops. Abdomen: Soft, non-tender, non-distended with normal bowel sounds. Musculoskeletal: No clubbing, cyanosis or edema bilaterally. Full range of motion without deformity. Skin: Skin color, texture, turgor normal.  No rashes or lesions. Neurologic:  Neurovascularly intact without any focal sensory/motor deficits. Mental status altered-slow to respond and difficult to comprehend  Psychiatric: Awake, confused/forgetful/slow to response,   Capillary Refill: Brisk,3 seconds, normal   Peripheral Pulses: +2 palpable, equal bilaterally        Labs: For convenience and continuity at follow-up the following most recent labs are provided:      CBC:    Lab Results   Component Value Date    WBC 3.4 09/28/2021    HGB 11.0 09/28/2021    HCT 34.6 09/28/2021     09/28/2021       Renal:    Lab Results   Component Value Date     09/28/2021    K 4.2 09/28/2021    CL 98 09/28/2021    CO2 31 09/28/2021    BUN 10 09/28/2021    CREATININE 0.8 09/28/2021    CALCIUM 8.7 09/28/2021    PHOS 4.6 03/27/2015     Significant Diagnostic Studies    Radiology:   XR CHEST 1 VIEW   Final Result   Persistent bilateral airspace disease, suspicious for pneumonia, decreased on   the left, but increased on the right. XR CHEST 1 VIEW   Final Result   Progressive bilateral airspace disease, remaining consistent with pneumonia. Small right pleural effusion. CT CHEST ABDOMEN PELVIS WO CONTRAST   Final Result   1. Commonly reported imaging features of COVID-19 pneumonia are present.    Other processes such as influenza pneumonia and organizing pneumonia, as can   be seen with drug toxicity and connective tissue disease, can cause a similar   imaging pattern. PneTyp   2. No acute findings in the abdomen and pelvis. XR CHEST PORTABLE   Final Result   Increasing airspace opacity left lower lobe         XR CHEST PORTABLE   Final Result   Small left basilar pneumonia. XR HIP RIGHT (2-3 VIEWS)   Final Result   No acute finding of the right hip. Consults:   IP CONSULT TO HOSPITALIST  IP CONSULT TO CARDIOLOGY  IP CONSULT TO NEPHROLOGY  IP CONSULT TO HOME CARE NEEDS  IP CONSULT TO PHARMACY  IP CONSULT TO INFECTIOUS DISEASES  IP CONSULT TO PULMONOLOGY    Disposition: Home     Condition at Discharge: Stable    Discharge Instructions/Follow-up: PCP and cardiologist as recommended    Code Status:  Full Code    Activity: activity as tolerated    Diet: cardiac diet      Discharge Medications:   Current Discharge Medication List           Details   aspirin 81 MG chewable tablet Take 1 tablet by mouth daily  Qty: 30 tablet, Refills: 1      lisinopril (PRINIVIL;ZESTRIL) 5 MG tablet Take 1 tablet by mouth daily  Qty: 30 tablet, Refills: 1      metoprolol succinate (TOPROL XL) 50 MG extended release tablet Take 1 tablet by mouth daily  Qty: 30 tablet, Refills: 1      furosemide (LASIX) 40 MG tablet 40 mg bid for 3 days then 40 mg daily  Qty: 45 tablet, Refills: 1              Details   simvastatin (ZOCOR) 20 MG tablet Take 1 tablet by mouth nightly  Qty: 30 tablet, Refills: 1              Details   magnesium oxide (MAGNESIUM-OXIDE) 400 (241.3 Mg) MG TABS tablet Take 1 tablet by mouth 2 times daily  Qty: 120 tablet, Refills: 3      VITAMIN D, CHOLECALCIFEROL, PO Take by mouth daily      Dulaglutide (TRULICITY) 1.5 BARRETO/4.7LW SOPN Inject into the skin once a week      metFORMIN (GLUCOPHAGE) 500 MG tablet Take 1,000 mg by mouth 2 times daily (with meals).       albuterol (PROVENTIL HFA;VENTOLIN HFA) 108 (90 BASE) MCG/ACT inhaler Inhale 2 puffs into the lungs every 4 hours as needed. levocetirizine (XYZAL) 5 MG tablet Take 5 mg by mouth nightly      Linagliptin (TRADJENTA PO) Take by mouth      Cyanocobalamin (VITAMIN B-12 PO) Take by mouth 2 times daily       Blood Pressure Monitoring (B-D ASSURE BPM/AUTO ARM CUFF) MISC For essential hypertension  Qty: 1 each, Refills: 0      gabapentin (NEURONTIN) 100 MG capsule Take 500 mg by mouth nightly. She only takes at night because it makes her tired       tiZANidine (ZANAFLEX) 4 MG tablet Take 4 mg by mouth every 6 hours as needed. Time Spent on discharge is more than 30 minutes in the examination, evaluation, counseling and review of medications and discharge plan. Signed:    Barber Gibson MD   9/28/2021      Thank you Caitlin Cain for the opportunity to be involved in this patient's care. If you have any questions or concerns please feel free to contact me at [phone].

## 2021-09-28 NOTE — CARE COORDINATION
Writer met with pt, she is eager to be discharged, her daughter will be here to transport home whenever she calls her. Pt will go home with new continuous home oxygen at 3L via [employer]. Writer spoke with [employer], will send pt with portable tank. Writer will call [employer] when pt leaves, they will deliver concentrator to pt's home. Pt declined wanting any home care, has family support at home.   DINORA Palm-RN

## 2021-09-28 NOTE — PLAN OF CARE
Problem: Falls - Risk of:  Goal: Will remain free from falls  Description: Will remain free from falls  9/28/2021 1351 by Quan Gracia RN  Outcome: Completed  9/28/2021 0127 by Anai Robb RN  Outcome: Ongoing  Goal: Absence of physical injury  Description: Absence of physical injury  Outcome: Completed     Problem: Nutrition  Goal: Optimal nutrition therapy  Outcome: Completed     Problem: Pain:  Description: Pain management should include both nonpharmacologic and pharmacologic interventions.   Goal: Pain level will decrease  Description: Pain level will decrease  Outcome: Completed  Goal: Control of acute pain  Description: Control of acute pain  Outcome: Completed  Goal: Control of chronic pain  Description: Control of chronic pain  Outcome: Completed

## 2021-09-29 ENCOUNTER — FOLLOWUP TELEPHONE ENCOUNTER (OUTPATIENT)
Dept: TELEMETRY | Age: 71
End: 2021-09-29

## 2021-09-29 NOTE — TELEPHONE ENCOUNTER
Writer attempted follow up phone call. Phone number listed is not correct.  states number is not in service.  Susannah Thompson RN

## 2021-09-30 ENCOUNTER — APPOINTMENT (OUTPATIENT)
Dept: GENERAL RADIOLOGY | Age: 71
DRG: 286 | End: 2021-09-30
Payer: MEDICARE

## 2021-09-30 ENCOUNTER — APPOINTMENT (OUTPATIENT)
Dept: CT IMAGING | Age: 71
DRG: 286 | End: 2021-09-30
Payer: MEDICARE

## 2021-09-30 ENCOUNTER — HOSPITAL ENCOUNTER (INPATIENT)
Age: 71
LOS: 4 days | Discharge: NURSING, REHAB OR LONG-TERM CARE | DRG: 286 | End: 2021-10-04
Attending: EMERGENCY MEDICINE | Admitting: INTERNAL MEDICINE
Payer: MEDICARE

## 2021-09-30 DIAGNOSIS — I24.9 ACS (ACUTE CORONARY SYNDROME) (HCC): Primary | ICD-10-CM

## 2021-09-30 PROBLEM — I42.8 NONISCHEMIC CARDIOMYOPATHY (HCC): Status: ACTIVE | Noted: 2021-09-26

## 2021-09-30 LAB
AMMONIA: <10 UMOL/L (ref 11–51)
ANION GAP SERPL CALCULATED.3IONS-SCNC: 13 MMOL/L (ref 3–16)
APTT: 24.9 SEC (ref 26.2–38.6)
BASE EXCESS VENOUS: 4.8 MMOL/L (ref -3–3)
BASOPHILS ABSOLUTE: 0 K/UL (ref 0–0.2)
BASOPHILS RELATIVE PERCENT: 0.5 %
BUN BLDV-MCNC: 18 MG/DL (ref 7–20)
CALCIUM SERPL-MCNC: 9.8 MG/DL (ref 8.3–10.6)
CARBOXYHEMOGLOBIN: 2.7 % (ref 0–1.5)
CHLORIDE BLD-SCNC: 94 MMOL/L (ref 99–110)
CO2: 28 MMOL/L (ref 21–32)
CREAT SERPL-MCNC: 1 MG/DL (ref 0.6–1.2)
EKG ATRIAL RATE: 73 BPM
EKG DIAGNOSIS: NORMAL
EKG P AXIS: 46 DEGREES
EKG P-R INTERVAL: 142 MS
EKG Q-T INTERVAL: 554 MS
EKG QRS DURATION: 114 MS
EKG QTC CALCULATION (BAZETT): 610 MS
EKG R AXIS: -27 DEGREES
EKG T AXIS: 117 DEGREES
EKG VENTRICULAR RATE: 73 BPM
EOSINOPHILS ABSOLUTE: 0.1 K/UL (ref 0–0.6)
EOSINOPHILS RELATIVE PERCENT: 1.2 %
GFR AFRICAN AMERICAN: >60
GFR NON-AFRICAN AMERICAN: 54
GLUCOSE BLD-MCNC: 105 MG/DL (ref 70–99)
GLUCOSE BLD-MCNC: 107 MG/DL (ref 70–99)
GLUCOSE BLD-MCNC: 111 MG/DL (ref 70–99)
GLUCOSE BLD-MCNC: 144 MG/DL (ref 70–99)
GLUCOSE BLD-MCNC: 96 MG/DL (ref 70–99)
HCO3 VENOUS: 27.7 MMOL/L (ref 23–29)
HCT VFR BLD CALC: 34.3 % (ref 36–48)
HCT VFR BLD CALC: 36.3 % (ref 36–48)
HEMOGLOBIN: 11 G/DL (ref 12–16)
HEMOGLOBIN: 11.7 G/DL (ref 12–16)
INR BLD: 1.34 (ref 0.88–1.12)
LYMPHOCYTES ABSOLUTE: 0.5 K/UL (ref 1–5.1)
LYMPHOCYTES RELATIVE PERCENT: 8.1 %
MAGNESIUM: 1.8 MG/DL (ref 1.8–2.4)
MCH RBC QN AUTO: 28.7 PG (ref 26–34)
MCH RBC QN AUTO: 28.7 PG (ref 26–34)
MCHC RBC AUTO-ENTMCNC: 32 G/DL (ref 31–36)
MCHC RBC AUTO-ENTMCNC: 32.2 G/DL (ref 31–36)
MCV RBC AUTO: 89.1 FL (ref 80–100)
MCV RBC AUTO: 89.6 FL (ref 80–100)
METHEMOGLOBIN VENOUS: 0.3 %
MONOCYTES ABSOLUTE: 0.3 K/UL (ref 0–1.3)
MONOCYTES RELATIVE PERCENT: 4.4 %
NEUTROPHILS ABSOLUTE: 5 K/UL (ref 1.7–7.7)
NEUTROPHILS RELATIVE PERCENT: 85.8 %
O2 SAT, VEN: 74 %
O2 THERAPY: ABNORMAL
PCO2, VEN: 34.9 MMHG (ref 40–50)
PDW BLD-RTO: 22.4 % (ref 12.4–15.4)
PDW BLD-RTO: 22.8 % (ref 12.4–15.4)
PERFORMED ON: ABNORMAL
PERFORMED ON: NORMAL
PH VENOUS: 7.52 (ref 7.35–7.45)
PLATELET # BLD: 241 K/UL (ref 135–450)
PLATELET # BLD: 257 K/UL (ref 135–450)
PMV BLD AUTO: 7.6 FL (ref 5–10.5)
PMV BLD AUTO: 7.7 FL (ref 5–10.5)
PO2, VEN: 37.6 MMHG (ref 25–40)
POC ACT LR: 176 SEC
POTASSIUM SERPL-SCNC: 3.9 MMOL/L (ref 3.5–5.1)
PRO-BNP: 3351 PG/ML (ref 0–124)
PROTHROMBIN TIME: 15.3 SEC (ref 9.9–12.7)
RBC # BLD: 3.83 M/UL (ref 4–5.2)
RBC # BLD: 4.07 M/UL (ref 4–5.2)
SODIUM BLD-SCNC: 135 MMOL/L (ref 136–145)
SPECIMEN STATUS: NORMAL
TCO2 CALC VENOUS: 29 MMOL/L
TROPONIN: 0.01 NG/ML
WBC # BLD: 5.7 K/UL (ref 4–11)
WBC # BLD: 5.8 K/UL (ref 4–11)

## 2021-09-30 PROCEDURE — 6370000000 HC RX 637 (ALT 250 FOR IP): Performed by: EMERGENCY MEDICINE

## 2021-09-30 PROCEDURE — 4A023N8 MEASUREMENT OF CARDIAC SAMPLING AND PRESSURE, BILATERAL, PERCUTANEOUS APPROACH: ICD-10-PCS | Performed by: INTERNAL MEDICINE

## 2021-09-30 PROCEDURE — 6370000000 HC RX 637 (ALT 250 FOR IP): Performed by: INTERNAL MEDICINE

## 2021-09-30 PROCEDURE — 83735 ASSAY OF MAGNESIUM: CPT

## 2021-09-30 PROCEDURE — C1760 CLOSURE DEV, VASC: HCPCS

## 2021-09-30 PROCEDURE — 2709999900 HC NON-CHARGEABLE SUPPLY

## 2021-09-30 PROCEDURE — 80048 BASIC METABOLIC PNL TOTAL CA: CPT

## 2021-09-30 PROCEDURE — 85025 COMPLETE CBC W/AUTO DIFF WBC: CPT

## 2021-09-30 PROCEDURE — 82140 ASSAY OF AMMONIA: CPT

## 2021-09-30 PROCEDURE — 6370000000 HC RX 637 (ALT 250 FOR IP): Performed by: NURSE PRACTITIONER

## 2021-09-30 PROCEDURE — 93458 L HRT ARTERY/VENTRICLE ANGIO: CPT | Performed by: INTERNAL MEDICINE

## 2021-09-30 PROCEDURE — 96374 THER/PROPH/DIAG INJ IV PUSH: CPT

## 2021-09-30 PROCEDURE — 6360000002 HC RX W HCPCS: Performed by: EMERGENCY MEDICINE

## 2021-09-30 PROCEDURE — 70450 CT HEAD/BRAIN W/O DYE: CPT

## 2021-09-30 PROCEDURE — 2580000003 HC RX 258: Performed by: INTERNAL MEDICINE

## 2021-09-30 PROCEDURE — 85347 COAGULATION TIME ACTIVATED: CPT

## 2021-09-30 PROCEDURE — 6360000002 HC RX W HCPCS

## 2021-09-30 PROCEDURE — C1894 INTRO/SHEATH, NON-LASER: HCPCS

## 2021-09-30 PROCEDURE — 94761 N-INVAS EAR/PLS OXIMETRY MLT: CPT

## 2021-09-30 PROCEDURE — 6360000002 HC RX W HCPCS: Performed by: INTERNAL MEDICINE

## 2021-09-30 PROCEDURE — C1769 GUIDE WIRE: HCPCS

## 2021-09-30 PROCEDURE — 99291 CRITICAL CARE FIRST HOUR: CPT | Performed by: INTERNAL MEDICINE

## 2021-09-30 PROCEDURE — 85027 COMPLETE CBC AUTOMATED: CPT

## 2021-09-30 PROCEDURE — 6360000004 HC RX CONTRAST MEDICATION

## 2021-09-30 PROCEDURE — 99285 EMERGENCY DEPT VISIT HI MDM: CPT

## 2021-09-30 PROCEDURE — 96361 HYDRATE IV INFUSION ADD-ON: CPT

## 2021-09-30 PROCEDURE — 1200000000 HC SEMI PRIVATE

## 2021-09-30 PROCEDURE — 71045 X-RAY EXAM CHEST 1 VIEW: CPT

## 2021-09-30 PROCEDURE — 84484 ASSAY OF TROPONIN QUANT: CPT

## 2021-09-30 PROCEDURE — 93458 L HRT ARTERY/VENTRICLE ANGIO: CPT

## 2021-09-30 PROCEDURE — 36415 COLL VENOUS BLD VENIPUNCTURE: CPT

## 2021-09-30 PROCEDURE — 99152 MOD SED SAME PHYS/QHP 5/>YRS: CPT | Performed by: INTERNAL MEDICINE

## 2021-09-30 PROCEDURE — 2500000003 HC RX 250 WO HCPCS

## 2021-09-30 PROCEDURE — 83880 ASSAY OF NATRIURETIC PEPTIDE: CPT

## 2021-09-30 PROCEDURE — 82803 BLOOD GASES ANY COMBINATION: CPT

## 2021-09-30 PROCEDURE — 93010 ELECTROCARDIOGRAM REPORT: CPT | Performed by: INTERNAL MEDICINE

## 2021-09-30 PROCEDURE — 85610 PROTHROMBIN TIME: CPT

## 2021-09-30 PROCEDURE — 93005 ELECTROCARDIOGRAM TRACING: CPT | Performed by: EMERGENCY MEDICINE

## 2021-09-30 PROCEDURE — 2700000000 HC OXYGEN THERAPY PER DAY

## 2021-09-30 PROCEDURE — 2580000003 HC RX 258: Performed by: EMERGENCY MEDICINE

## 2021-09-30 PROCEDURE — 99233 SBSQ HOSP IP/OBS HIGH 50: CPT | Performed by: NURSE PRACTITIONER

## 2021-09-30 PROCEDURE — 83036 HEMOGLOBIN GLYCOSYLATED A1C: CPT

## 2021-09-30 PROCEDURE — 85730 THROMBOPLASTIN TIME PARTIAL: CPT

## 2021-09-30 RX ORDER — HEPARIN SODIUM 10000 [USP'U]/100ML
840 INJECTION, SOLUTION INTRAVENOUS CONTINUOUS
Status: DISCONTINUED | OUTPATIENT
Start: 2021-09-30 | End: 2021-09-30

## 2021-09-30 RX ORDER — DEXTROSE MONOHYDRATE 50 MG/ML
100 INJECTION, SOLUTION INTRAVENOUS PRN
Status: DISCONTINUED | OUTPATIENT
Start: 2021-09-30 | End: 2021-10-05 | Stop reason: HOSPADM

## 2021-09-30 RX ORDER — HEPARIN SODIUM 10000 [USP'U]/100ML
INJECTION, SOLUTION INTRAVENOUS
Status: DISCONTINUED
Start: 2021-09-30 | End: 2021-09-30

## 2021-09-30 RX ORDER — ALBUTEROL SULFATE 90 UG/1
2 AEROSOL, METERED RESPIRATORY (INHALATION) EVERY 4 HOURS PRN
Status: DISCONTINUED | OUTPATIENT
Start: 2021-09-30 | End: 2021-10-05 | Stop reason: HOSPADM

## 2021-09-30 RX ORDER — SODIUM CHLORIDE 9 MG/ML
25 INJECTION, SOLUTION INTRAVENOUS PRN
Status: DISCONTINUED | OUTPATIENT
Start: 2021-09-30 | End: 2021-10-05 | Stop reason: HOSPADM

## 2021-09-30 RX ORDER — LORAZEPAM 2 MG/ML
0.5 INJECTION INTRAMUSCULAR ONCE
Status: COMPLETED | OUTPATIENT
Start: 2021-09-30 | End: 2021-09-30

## 2021-09-30 RX ORDER — FUROSEMIDE 40 MG/1
40 TABLET ORAL 2 TIMES DAILY
Status: DISCONTINUED | OUTPATIENT
Start: 2021-09-30 | End: 2021-10-01

## 2021-09-30 RX ORDER — PROMETHAZINE HYDROCHLORIDE 12.5 MG/1
12.5 TABLET ORAL EVERY 6 HOURS PRN
Status: DISCONTINUED | OUTPATIENT
Start: 2021-09-30 | End: 2021-10-01

## 2021-09-30 RX ORDER — FENTANYL CITRATE 50 UG/ML
INJECTION, SOLUTION INTRAMUSCULAR; INTRAVENOUS
Status: COMPLETED | OUTPATIENT
Start: 2021-09-30 | End: 2021-09-30

## 2021-09-30 RX ORDER — FUROSEMIDE 40 MG/1
40 TABLET ORAL DAILY
COMMUNITY

## 2021-09-30 RX ORDER — HYDRALAZINE HYDROCHLORIDE 20 MG/ML
INJECTION INTRAMUSCULAR; INTRAVENOUS
Status: COMPLETED | OUTPATIENT
Start: 2021-09-30 | End: 2021-09-30

## 2021-09-30 RX ORDER — PROMETHAZINE HYDROCHLORIDE 12.5 MG/1
12.5 TABLET ORAL EVERY 6 HOURS PRN
COMMUNITY

## 2021-09-30 RX ORDER — ATORVASTATIN CALCIUM 10 MG/1
10 TABLET, FILM COATED ORAL DAILY
Status: DISCONTINUED | OUTPATIENT
Start: 2021-09-30 | End: 2021-10-05 | Stop reason: HOSPADM

## 2021-09-30 RX ORDER — LISINOPRIL 5 MG/1
5 TABLET ORAL ONCE
Status: COMPLETED | OUTPATIENT
Start: 2021-09-30 | End: 2021-09-30

## 2021-09-30 RX ORDER — SODIUM CHLORIDE 0.9 % (FLUSH) 0.9 %
5-40 SYRINGE (ML) INJECTION EVERY 12 HOURS SCHEDULED
Status: DISCONTINUED | OUTPATIENT
Start: 2021-09-30 | End: 2021-10-05 | Stop reason: HOSPADM

## 2021-09-30 RX ORDER — HEPARIN SODIUM 1000 [USP'U]/ML
2000 INJECTION, SOLUTION INTRAVENOUS; SUBCUTANEOUS PRN
Status: DISCONTINUED | OUTPATIENT
Start: 2021-09-30 | End: 2021-09-30

## 2021-09-30 RX ORDER — ASPIRIN 81 MG/1
324 TABLET, CHEWABLE ORAL ONCE
Status: COMPLETED | OUTPATIENT
Start: 2021-09-30 | End: 2021-09-30

## 2021-09-30 RX ORDER — MIDAZOLAM HYDROCHLORIDE 5 MG/ML
INJECTION INTRAMUSCULAR; INTRAVENOUS
Status: COMPLETED | OUTPATIENT
Start: 2021-09-30 | End: 2021-09-30

## 2021-09-30 RX ORDER — LEVOCETIRIZINE DIHYDROCHLORIDE 5 MG/1
5 TABLET, FILM COATED ORAL NIGHTLY
Status: DISCONTINUED | OUTPATIENT
Start: 2021-09-30 | End: 2021-09-30

## 2021-09-30 RX ORDER — LORAZEPAM 2 MG/ML
INJECTION INTRAMUSCULAR
Status: DISPENSED
Start: 2021-09-30 | End: 2021-09-30

## 2021-09-30 RX ORDER — NICOTINE POLACRILEX 4 MG
15 LOZENGE BUCCAL PRN
Status: DISCONTINUED | OUTPATIENT
Start: 2021-09-30 | End: 2021-10-05 | Stop reason: HOSPADM

## 2021-09-30 RX ORDER — ASPIRIN 81 MG/1
81 TABLET, CHEWABLE ORAL DAILY
Status: DISCONTINUED | OUTPATIENT
Start: 2021-09-30 | End: 2021-10-05 | Stop reason: HOSPADM

## 2021-09-30 RX ORDER — SODIUM CHLORIDE 0.9 % (FLUSH) 0.9 %
5-40 SYRINGE (ML) INJECTION PRN
Status: DISCONTINUED | OUTPATIENT
Start: 2021-09-30 | End: 2021-10-05 | Stop reason: HOSPADM

## 2021-09-30 RX ORDER — HEPARIN SODIUM 1000 [USP'U]/ML
4000 INJECTION, SOLUTION INTRAVENOUS; SUBCUTANEOUS ONCE
Status: COMPLETED | OUTPATIENT
Start: 2021-09-30 | End: 2021-09-30

## 2021-09-30 RX ORDER — ONDANSETRON 2 MG/ML
4 INJECTION INTRAMUSCULAR; INTRAVENOUS EVERY 6 HOURS PRN
Status: DISCONTINUED | OUTPATIENT
Start: 2021-09-30 | End: 2021-10-01

## 2021-09-30 RX ORDER — MIDAZOLAM HYDROCHLORIDE 1 MG/ML
0.5 INJECTION INTRAMUSCULAR; INTRAVENOUS ONCE
Status: COMPLETED | OUTPATIENT
Start: 2021-09-30 | End: 2021-09-30

## 2021-09-30 RX ORDER — MAGNESIUM SULFATE IN WATER 40 MG/ML
2000 INJECTION, SOLUTION INTRAVENOUS ONCE
Status: COMPLETED | OUTPATIENT
Start: 2021-09-30 | End: 2021-09-30

## 2021-09-30 RX ORDER — LISINOPRIL 5 MG/1
5 TABLET ORAL DAILY
Status: DISCONTINUED | OUTPATIENT
Start: 2021-09-30 | End: 2021-09-30

## 2021-09-30 RX ORDER — ACETAMINOPHEN 325 MG/1
650 TABLET ORAL EVERY 4 HOURS PRN
Status: DISCONTINUED | OUTPATIENT
Start: 2021-09-30 | End: 2021-10-05 | Stop reason: HOSPADM

## 2021-09-30 RX ORDER — 0.9 % SODIUM CHLORIDE 0.9 %
1000 INTRAVENOUS SOLUTION INTRAVENOUS ONCE
Status: COMPLETED | OUTPATIENT
Start: 2021-09-30 | End: 2021-09-30

## 2021-09-30 RX ORDER — MIDAZOLAM HYDROCHLORIDE 1 MG/ML
INJECTION INTRAMUSCULAR; INTRAVENOUS
Status: COMPLETED
Start: 2021-09-30 | End: 2021-09-30

## 2021-09-30 RX ORDER — DEXTROSE MONOHYDRATE 25 G/50ML
12.5 INJECTION, SOLUTION INTRAVENOUS PRN
Status: DISCONTINUED | OUTPATIENT
Start: 2021-09-30 | End: 2021-10-05 | Stop reason: HOSPADM

## 2021-09-30 RX ORDER — TIZANIDINE 4 MG/1
4 TABLET ORAL EVERY 6 HOURS PRN
Status: DISCONTINUED | OUTPATIENT
Start: 2021-09-30 | End: 2021-10-01

## 2021-09-30 RX ORDER — METOPROLOL SUCCINATE 50 MG/1
50 TABLET, EXTENDED RELEASE ORAL DAILY
Status: DISCONTINUED | OUTPATIENT
Start: 2021-09-30 | End: 2021-10-05 | Stop reason: HOSPADM

## 2021-09-30 RX ORDER — LISINOPRIL 10 MG/1
10 TABLET ORAL DAILY
Status: DISCONTINUED | OUTPATIENT
Start: 2021-10-01 | End: 2021-10-05 | Stop reason: HOSPADM

## 2021-09-30 RX ORDER — EMPAGLIFLOZIN 25 MG/1
25 TABLET, FILM COATED ORAL EVERY MORNING
COMMUNITY
Start: 2019-04-23

## 2021-09-30 RX ORDER — HEPARIN SODIUM 1000 [USP'U]/ML
4000 INJECTION, SOLUTION INTRAVENOUS; SUBCUTANEOUS PRN
Status: DISCONTINUED | OUTPATIENT
Start: 2021-09-30 | End: 2021-09-30

## 2021-09-30 RX ADMIN — MIDAZOLAM 0.5 MG: 1 INJECTION INTRAMUSCULAR; INTRAVENOUS at 05:35

## 2021-09-30 RX ADMIN — LORAZEPAM 0.5 MG: 2 INJECTION INTRAMUSCULAR; INTRAVENOUS at 05:29

## 2021-09-30 RX ADMIN — ASPIRIN 324 MG: 81 TABLET, CHEWABLE ORAL at 02:00

## 2021-09-30 RX ADMIN — METOPROLOL SUCCINATE 50 MG: 50 TABLET, EXTENDED RELEASE ORAL at 08:18

## 2021-09-30 RX ADMIN — MAGNESIUM GLUCONATE 500 MG ORAL TABLET 400 MG: 500 TABLET ORAL at 08:18

## 2021-09-30 RX ADMIN — HYDRALAZINE HYDROCHLORIDE 10 MG: 20 INJECTION INTRAMUSCULAR; INTRAVENOUS at 03:26

## 2021-09-30 RX ADMIN — HEPARIN SODIUM AND DEXTROSE 840 UNITS/HR: 10000; 5 INJECTION INTRAVENOUS at 02:05

## 2021-09-30 RX ADMIN — MIDAZOLAM HYDROCHLORIDE 1 MG: 5 INJECTION, SOLUTION INTRAMUSCULAR; INTRAVENOUS at 03:14

## 2021-09-30 RX ADMIN — LISINOPRIL 5 MG: 5 TABLET ORAL at 12:24

## 2021-09-30 RX ADMIN — MIDAZOLAM HYDROCHLORIDE 1 MG: 5 INJECTION, SOLUTION INTRAMUSCULAR; INTRAVENOUS at 03:04

## 2021-09-30 RX ADMIN — MUPIROCIN: 20 OINTMENT TOPICAL at 08:19

## 2021-09-30 RX ADMIN — ATORVASTATIN CALCIUM 10 MG: 10 TABLET, FILM COATED ORAL at 08:18

## 2021-09-30 RX ADMIN — MAGNESIUM GLUCONATE 500 MG ORAL TABLET 400 MG: 500 TABLET ORAL at 21:20

## 2021-09-30 RX ADMIN — HEPARIN SODIUM 4000 UNITS: 1000 INJECTION, SOLUTION INTRAVENOUS; SUBCUTANEOUS at 02:03

## 2021-09-30 RX ADMIN — FENTANYL CITRATE 25 MCG: 50 INJECTION INTRAMUSCULAR; INTRAVENOUS at 03:14

## 2021-09-30 RX ADMIN — Medication 10 ML: at 21:21

## 2021-09-30 RX ADMIN — MAGNESIUM SULFATE HEPTAHYDRATE 2000 MG: 40 INJECTION, SOLUTION INTRAVENOUS at 05:06

## 2021-09-30 RX ADMIN — LORAZEPAM 0.5 MG: 2 INJECTION INTRAMUSCULAR; INTRAVENOUS at 05:36

## 2021-09-30 RX ADMIN — FENTANYL CITRATE 50 MCG: 50 INJECTION INTRAMUSCULAR; INTRAVENOUS at 03:19

## 2021-09-30 RX ADMIN — ASPIRIN 81 MG: 81 TABLET, CHEWABLE ORAL at 08:18

## 2021-09-30 RX ADMIN — Medication 10 ML: at 08:19

## 2021-09-30 RX ADMIN — SODIUM CHLORIDE 1000 ML: 9 INJECTION, SOLUTION INTRAVENOUS at 01:41

## 2021-09-30 RX ADMIN — TICAGRELOR 180 MG: 90 TABLET ORAL at 01:48

## 2021-09-30 RX ADMIN — FUROSEMIDE 40 MG: 40 TABLET ORAL at 12:24

## 2021-09-30 RX ADMIN — LISINOPRIL 5 MG: 5 TABLET ORAL at 08:18

## 2021-09-30 RX ADMIN — MIDAZOLAM 0.5 MG: 1 INJECTION INTRAMUSCULAR; INTRAVENOUS at 05:42

## 2021-09-30 RX ADMIN — MIDAZOLAM HYDROCHLORIDE 1 MG: 5 INJECTION, SOLUTION INTRAMUSCULAR; INTRAVENOUS at 03:19

## 2021-09-30 RX ADMIN — LORAZEPAM 0.5 MG: 2 INJECTION INTRAMUSCULAR; INTRAVENOUS at 04:53

## 2021-09-30 RX ADMIN — FENTANYL CITRATE 25 MCG: 50 INJECTION INTRAMUSCULAR; INTRAVENOUS at 03:04

## 2021-09-30 RX ADMIN — LORAZEPAM 0.5 MG: 2 INJECTION INTRAMUSCULAR; INTRAVENOUS at 05:30

## 2021-09-30 ASSESSMENT — PAIN DESCRIPTION - LOCATION: LOCATION: CHEST

## 2021-09-30 ASSESSMENT — PAIN DESCRIPTION - ORIENTATION: ORIENTATION: MID

## 2021-09-30 ASSESSMENT — PAIN SCALES - GENERAL
PAINLEVEL_OUTOF10: 5
PAINLEVEL_OUTOF10: 0
PAINLEVEL_OUTOF10: 4
PAINLEVEL_OUTOF10: 0
PAINLEVEL_OUTOF10: 0

## 2021-09-30 ASSESSMENT — PAIN DESCRIPTION - PAIN TYPE: TYPE: ACUTE PAIN

## 2021-09-30 NOTE — PROGRESS NOTES
09/30/21 @ [phone] Hospital or Facility: [id number] From: Chiquita [place] RE: Leah Carbajal 1950 RM: [id number] DM, HTN.  Cath lab patient just got to the floor Need Callback: NEEDS CALLBACK CCU

## 2021-09-30 NOTE — PROGRESS NOTES
Pt has sat up repeatedly and wanted to leave AMA. Dr. Oliver Faustin at bedside and gave orders for Ativan 0.5mg. This was ordered three more times for a total of 2mg in attempts to calm pt enough for a head CT. Ativan had no effect and 0.5mg of versed was given. A second dose of 0.5mg of versed was given per Dr. Gloria Houser orders.

## 2021-09-30 NOTE — PROGRESS NOTES
[facility] 79   Daily Progress Note    Admit Date:  9/30/2021  HPI:    Chief Complaint   Patient presents with    Chest Pain     Pt recently admitted for CHF and pna. Pt states CP and SOB worse today. Pt received one dose of SL Nitro in route which brought her pain down from a 5 to a 1. Pt also received one duoneb. Pt arrives on 4L O2 NC which is what she was sent home on when discharged.  Shortness of Breath      Bharat Randhawa presented with chest pain and shortness of breath. EKG was abnormal.  She was taken urgently to the Cath Lab. LHC demonstrated no obstructive CAD, mildly elevated filling pressures and LVEF 45%. She was discharged just 2 days ago where she was admitted for shortness of breath, found to have new LBBB and LV dysfunction with EF 30-35% with anterior wall motion abnormalities. She was treated for pneumonia THEO and metabolic acidosis. She was insistent on being discharged home even though was felt to be premature. She was discharged on Lasix 40 mg twice daily for 3 days for continued volume excess. She was also recommended to follow-up with Dr. Wilbert Charles for cardiac catheterization which have been planned as outpatient. Subjective:  Ms. Garber Arrant lying in bed, lethargic but does awaken to verbal and tactile stimulation. She is able to respond yes and no and follow commands. She denies any further chest pain or shortness of breath.     Objective:   Patient Vitals for the past 24 hrs:   BP Temp Temp src Pulse Resp SpO2 Height Weight   09/30/21 0800 (!) 161/71 97.6 °F (36.4 °C) Axillary 82 18 96 % -- --   09/30/21 0600 (!) 166/71 -- -- 71 -- 97 % -- --   09/30/21 0530 (!) 154/96 -- -- 86 -- 100 % -- --   09/30/21 0500 (!) 180/81 -- -- 73 -- 100 % -- --   09/30/21 0432 (!) 160/75 -- -- 82 -- -- -- --   09/30/21 0417 (!) 144/61 -- -- 81 -- 100 % -- --   09/30/21 0402 (!) 151/74 98.1 °F (36.7 °C) Oral 81 18 96 % -- --   09/30/21 0241 -- -- -- 80 20 100 % -- --   09/30/21 0230 (!) 154/60 -- -- 72 20 100 % -- --   09/30/21 0220 (!) 151/61 -- -- 71 17 100 % -- --   09/30/21 0211 (!) 138/51 -- -- 71 15 98 % -- --   09/30/21 0152 137/62 -- -- 81 19 100 % -- --   09/30/21 0142 (!) 89/48 -- -- 85 14 96 % -- --   09/30/21 0132 (!) 73/39 -- -- -- -- -- -- --   09/30/21 0129 (!) 69/45 98.7 °F (37.1 °C) Oral 75 24 95 % -- --   09/30/21 0126 -- -- -- -- -- -- 5' 3\" (1.6 m) 154 lb (69.9 kg)       Intake/Output Summary (Last 24 hours) at 9/30/2021 0919  Last data filed at 9/30/2021 0445  Gross per 24 hour   Intake 1000 ml   Output 300 ml   Net 700 ml     Wt Readings from Last 3 Encounters:   09/30/21 154 lb (69.9 kg)   09/28/21 157 lb (71.2 kg)   06/17/19 120 lb 8 oz (54.7 kg)         ASSESSMENT:   1. Chest pain: Noncardiac  2. Nonischemic cardiomyopathy: EF 30-35% now improved to 45-50 % by LVG, possible Takotsubo Cardiomyopathy, on ACEi and evidence-based beta blocker   3. CAD: Mild, nonobstructive, continue ASA and statin  4. Dyspnea: mild elevated filling pressures suggestive of CHF  5. CHF, acute on chronic systolic: LVEDP 18, BNP down trending, weight down ~ 3 lbs since discharge date  6. HYPERTENSION: elevated  7. Pneumonia  8. Prolonged QTC: avoid QT prolonging meds, monitor        PLAN:  1. Continue Lasix 40 mg po bid  2. Continue Toprol 50 mg daily  3. Increase lisinopril to 10 mg daily  4. Continue ASA and statin  5. Monitor overnight  6.  Strict I/O, daily weights and repeat labs in AM    JENN Strange CNP, 9/30/2021, 9:19 AM  [facility]   [phone]       Telemetry:  60-80  NYHA: III    Physical Exam:  General:  Awake, alert, NAD  Skin:  Warm and dry  Neck:  JVP 8 cm at 30 degrees  Chest: Clear to auscultation anteriorly  Cardiovascular:  RRR, normal S1S2, + murmur, no g/r  Abdomen:  Soft, nontender, +bowel sounds  Extremities:  No BLE edema  right femoral site without ooze, bruise or hematoma, dressing C,D,I, 2+ pulse      Medications:    magnesium oxide  400 mg Oral BID    aspirin  81 mg Oral Daily    lisinopril  5 mg Oral Daily    metoprolol succinate  50 mg Oral Daily    sodium chloride flush  5-40 mL IntraVENous 2 times per day    atorvastatin  10 mg Oral Daily    LORazepam        insulin lispro  0-6 Units SubCUTAneous Q4H    mupirocin   Nasal BID      sodium chloride      dextrose         Lab Data: Lab results independently reviewed and analyzed by myself 9/30/21   CBC:   Recent Labs     09/28/21 0522 09/30/21 0138 09/30/21  0426   WBC 3.4* 5.8 5.7   HGB 11.0* 11.0* 11.7*    257 241     BMP:    Recent Labs     09/28/21 0522 09/30/21 0138    135*   K 4.2 3.9   CO2 31 28   BUN 10 18   CREATININE 0.8 1.0     INR:    Recent Labs     09/30/21 0138   INR 1.34*     BNP:    Recent Labs     09/28/21 0522 09/30/21 0138   PROBNP 4,348* 3,351*     Cardiac Enzymes:      Ref. Range 9/19/2021 09:47 9/19/2021 14:24 9/19/2021 17:47 9/26/2021 11:36 9/26/2021 17:39 9/30/2021 01:38   Troponin Latest Ref Range: <0.01 ng/mL <0.01 <0.01 <0.01 0.02 (H) 0.02 (H) 0.01        Ref. Range 9/24/2021 06:15   CRP Latest Ref Range: 0.0 - 5.1 mg/L 21.5 (H)       Lipids:   Lab Results   Component Value Date    TRIG 113 12/11/2015    TRIG 84 10/30/2014    HDL 57 12/11/2015    HDL 70 10/30/2014    LDLCALC 59 12/11/2015    LDLCALC 77 10/30/2014       Cardiac Imaging:    Cardiac Cath 9/30/2021:  Access: Right CFA (right radial artery very small)  Hemostasis: Instill close advice   Moderate Sedation:  Start time: 0303  Stop time: 0330  3 mg versed   100 mcg fentanyl   An independent trained observer pushed medications at my direction. We monitored the patient's level of consciousness and vital signs/physiologic status throughout the procedure duration (see start and start times above).      Bleeding risk: Intermediate  LVEDP: 18 mmHg  AO: 170/72 mmHg  Estimated blood loss: Less than 25 mL  Contrast: 96 mL    Anatomy:   LM-normal  LAD-normal  Cx-normal  OM1- normal  RCA-normal  RPDA- normal  LVEF-45 to 50% with distal anterior, apical, inferoapical severe hypokinesis to akinesis   Impression:  1. Normal coronary arteries. 2.  Mild LV systolic dysfunction. 3.  Takotsubo cardiomyopathy? 4. Noncardiac chest pain. Plan:  1. Resume enteric-coated aspirin 81 mg daily. 2.  Coreg/Toprol-XL, ACE inhibitor/ARB. 3.  Diuresis. Echo 9/22/2021:  The left ventricular systolic function is moderately reduced with an ejection fraction of 30-35%. There is hypokinesis of the apex, apical lateral, apical septum, anterior, anteroseptum and apical anterior walls. Grade I diastolic dysfunction with normal filling pressure. Changes noted from previous echo on 6- in left ventricular function. Mild mitral regurgitation. Echo 2018:  Normal left ventricle systolic function with an estimated ejection fraction of 55%. No regional wall motion abnormalities are seen. Grade I diastolic dysfunction with normal filing pressure. The non-coronary cusp of the aortic valve is thickened/calcified with decreased leaflet mobility. No aortic stenosis noted. Mild pulmonic regurgitation. Mild tricuspid regurgitation. Systolic pulmonary artery pressure (SPAP) is normal and estimated at 24 mmHg (right atrial pressure 3 mmHg). Coronary angiogram 10/2014:  LM, LAD, LCX, RCA with no significant CAD (RCA with <10%)  Significant kinking and tortousity of vessel  LVEDP 5  LVEF 65%  PLAN  1. No significant CAD (only <10% in prox RCA)  2. CP likley from HTn and stress, possibly from coronary kinking.

## 2021-09-30 NOTE — PROGRESS NOTES
2186: Perfect serve message sent to Dr. Herb Marquez for hospitalist consult and made aware of pt agitation and refusal to follow instruction. Informed of needing to hold manual pressure to groin. Order given for 0.5mg of Ativan, UA, and head CT.

## 2021-09-30 NOTE — BRIEF OP NOTE
Cardiac Cath 9/30/2021:  Access: Right CFA (right radial artery very small)  Hemostasis: Instill close advice    Moderate Sedation:  Start time: 0303  Stop time: 0330  3 mg versed   100 mcg fentanyl   An independent trained observer pushed medications at my direction. We monitored the patient's level of consciousness and vital signs/physiologic status throughout the procedure duration (see start and start times above). Bleeding risk: Intermediate  LVEDP: 18 mmHg  AO: 170/72 mmHg  Estimated blood loss: Less than 25 mL  Contrast: 96 mL  Fluoroscopy time:     Anatomy:   LM-normal  LAD-normal  Cx-normal  OM1- normal  RCA-normal  RPDA- normal  LVEF-45 to 50% with distal anterior, apical, inferoapical severe hypokinesis to akinesis    Impression:  1. Normal coronary arteries. 2.  Mild LV systolic dysfunction. 3.  Takotsubo cardiomyopathy? 4. Noncardiac chest pain. Plan:  1. Resume enteric-coated aspirin 81 mg daily. 2.  Coreg/Toprol-XL, ACE inhibitor/ARB. 3.  Diuresis.

## 2021-09-30 NOTE — PROGRESS NOTES
Chart reviewed. Pt seen and examined. Pending workup and CThead. Transfer to floor later today if remains stable.

## 2021-09-30 NOTE — CONSULTS
Pharmacy to Manage Heparin Infusion per [facility]    Dx: STEMI  Pt wt = 69.9 kg  Baseline aPTT = ordered    Low Dose Heparin Infusion  Heparin 60 units/kg IVP bolus followed by Heparin infusion at 12 units/kg/hr (recommended initial max dose 1000 units./hr). Recheck aPTT in 6 hours. Goal aPTT = 60-90 seconds.   Silvia Corcoran, Pharm D.9/30/2021 2:05 AM

## 2021-09-30 NOTE — CARE COORDINATION
CASE MANAGEMENT INITIAL ASSESSMENT      Reviewed chart and completed assessment via telephone with: daughter Kushal Farrar, whom patient lives with. Patient is currently confused, with a sitter at the bedside. Explained Case Management role/services. Primary contact information:as below    Health Care Decision Maker :   Primary Decision Maker: Peterson Charles - [phone]    Secondary Decision Maker: Danay Jackson - [phone]    Supplemental (Other) Decision Maker: Tiffanie Melgar - [phone]          Can this person be reached and be able to respond quickly, such as within a few minutes or hours? Yes     Admit date/status:9/30/21  IPA    Diagnosis:Chest Pain    Is this a Readmission?:  Patient dc'd 9/28 (CP) . Readmitted 9/30 with recurrent CP     Insurance:[employer] Medicare    Precert required for SNF: Yes       3 night stay required: No    Living arrangements, Adls, care needs, prior to admission:Lives in two story home with daughter - stays on one floor. Fairly IPTA  Home O2 [employer]    Transportation: family    Durable Medical Equipment at home:  Walker__Cane__RTS__ BSC__Shower Chair__  02_x_ HHN__ CPAP__  BiPap__  Hospital Bed__ W/C___ Other__________    Services in the home and/or outpatient, prior to admission: [address] (if applicable)   · Name:  · Address:  · Dialysis Schedule:  · Phone:  · Fax:    PT/OT recs:not yet ordered    Hospital Exemption Notification (HEN):needed if SNF    Barriers to discharge:none    Plan/comments:Plans dc home with family.     ECOC on chart for MD signature  Yes      Uma Adkins, RN

## 2021-09-30 NOTE — PROGRESS NOTES
Patient transferred via staff to room 355. Bedside report given to [facility], and all questions answered. Patient's daughter Patito Phelps made aware of transfer. Patient noted as stable and on room air at time of transfer and all belongings sent with patient.

## 2021-09-30 NOTE — PRE SEDATION
Brief Pre-Op Note/Sedation Assessment      Mago Kulkarni  1950  [id number]  3:06 AM    Planned Procedure: Cardiac Catheterization Procedure  Post Procedure Plan: Return to same level of care  Consent: I have discussed with the patient and/or the patient representative the indication, alternatives, and the possible risks and/or complications of the planned procedure and the anesthesia methods. The patient and/or patient representative appear to understand and agree to proceed. Chief Complaint:   Chest Pain/Pressure  Anginal Equivalent      Indications for Cath Procedure:  1. Presentation:  ACS <= 24 hrs, Suspected CAD, Cardiomyopathy and LV Dysfunction  2. Anginal Classification within 2 weeks:  CCS IV - Inability to perform any activity without angina or angina at rest, i.e., severe limitation  3. Angina Symptoms Assessment:  Atypical Chest Pain  4. Heart Failure Class within last 2 weeks:  Yes:  Heart Failure Type: Systolic Severity:  Class III - Symptoms of HF on less-than-ordinary exertion  5. Cardiovascular Instability:  Yes:  Persistent ischemic symptoms (CP, ST Elevation)    Prior Ischemic Workup/Eval:  1. Pre-Procedural Medications: Yes: ACE/ARB/ARNI, Aspirin, Beta Blockers and STATIN  2. Stress Test Completed? No    Does Patient need surgery? Cath Valve Surgery:  No    Pre-Procedure Medical History:  Vital Signs:  BP (!) 154/60   Pulse 80   Temp 98.7 °F (37.1 °C) (Oral)   Resp 20   Ht 5' 3\" (1.6 m)   Wt 154 lb (69.9 kg)   SpO2 100%   BMI 27.28 kg/m²     Allergies: Allergies   Allergen Reactions    Morphine Other (See Comments)     Chest pain    Sulfamethoxazole      Other reaction(s): Nausea/vomit    Trimethoprim      Other reaction(s): Nausea/vomit    Alendronate Other (See Comments)     Chest pain     Bactrim [Sulfamethoxazole-Trimethoprim]     Buspirone      Other reaction(s): Other (See Comments)  Gi upset     Calcitonin ([place])      Other reaction(s):  Other (See Comments)  Headsaches     Demerol Hcl [Meperidine]     Dust Mite Extract     Esomeprazole Magnesium      Other reaction(s): Other (See Comments)  ELEVATED BP    Glipizide Other (See Comments)     Hypoglycemia    Metformin Diarrhea    Mometasone     Omeprazole      Other reaction(s): Other (See Comments)  Sick to stomach     Other Other (See Comments)     Trazadone- vomiting     Oxycodone      Other reaction(s): Nausea;Vomitting    Oxycodone-Acetaminophen     Pcn [Penicillins]      Other reaction(s): Trouble Breathing  Nausea      Percocet [Oxycodone-Acetaminophen]      Nausea      Prednisone     Propoxyphene     Ranitidine      Other reaction(s): Nausea    Sulfa Antibiotics     Toradol [Ketorolac Tromethamine] Other (See Comments)     Muscle spasms in chest    Tramadol      Other reaction(s):  Other (See Comments)  Dizziness     Zantac [Ranitidine Hcl]      Medications:    Current Facility-Administered Medications   Medication Dose Route Frequency Provider Last Rate Last Admin    heparin (porcine) injection 4,000 Units  4,000 Units IntraVENous PRN Krystal Lozano MD        heparin (porcine) injection 2,000 Units  2,000 Units IntraVENous PRN Krystal Lozano MD        heparin 25,000 units in dextrose 5% 250 mL (premix) infusion  840 Units/hr IntraVENous Continuous Krystal Lozano MD 8.4 mL/hr at 09/30/21 0205 840 Units/hr at 09/30/21 0205    heparin (porcine) 100 UNIT/ML infusion              Current Outpatient Medications   Medication Sig Dispense Refill    promethazine (PHENERGAN) 12.5 MG tablet Take 12.5 mg by mouth every 6 hours as needed for Nausea      aspirin 81 MG chewable tablet Take 1 tablet by mouth daily 30 tablet 1    simvastatin (ZOCOR) 20 MG tablet Take 1 tablet by mouth nightly 30 tablet 1    lisinopril (PRINIVIL;ZESTRIL) 5 MG tablet Take 1 tablet by mouth daily 30 tablet 1    metoprolol succinate (TOPROL XL) 50 MG extended release tablet Take 1 tablet by mouth daily 30 tablet 1    furosemide (LASIX) 40 MG tablet 40 mg bid for 3 days then 40 mg daily 45 tablet 1    magnesium oxide (MAGNESIUM-OXIDE) 400 (241.3 Mg) MG TABS tablet Take 1 tablet by mouth 2 times daily 120 tablet 3    levocetirizine (XYZAL) 5 MG tablet Take 5 mg by mouth nightly      Linagliptin (TRADJENTA PO) Take by mouth      Cyanocobalamin (VITAMIN B-12 PO) Take by mouth 2 times daily       VITAMIN D, CHOLECALCIFEROL, PO Take by mouth daily      Dulaglutide (TRULICITY) 1.5 MG/4.5RO SOPN Inject into the skin once a week      Blood Pressure Monitoring (B-D ASSURE BPM/AUTO ARM CUFF) MISC For essential hypertension 1 each 0    gabapentin (NEURONTIN) 100 MG capsule Take 500 mg by mouth nightly. She only takes at night because it makes her tired       tiZANidine (ZANAFLEX) 4 MG tablet Take 4 mg by mouth every 6 hours as needed.  metFORMIN (GLUCOPHAGE) 500 MG tablet Take 1,000 mg by mouth 2 times daily (with meals).  albuterol (PROVENTIL HFA;VENTOLIN HFA) 108 (90 BASE) MCG/ACT inhaler Inhale 2 puffs into the lungs every 4 hours as needed.          Past Medical History:    Past Medical History:   Diagnosis Date    Arthritis     Asthma     Diabetes mellitus ([address]     type 2, takes PO meds    History of palpitations     Hyperlipidemia     Hypertension        Surgical History:    Past Surgical History:   Procedure Laterality Date    CATARACT REMOVAL WITH IMPLANT Right 10/18/2017    entered to epic from h&P    CATARACT REMOVAL WITH IMPLANT Left 11/08/2017    CHOLECYSTECTOMY      COLONOSCOPY      CORONARY ANGIOPLASTY  10/30/14    CYST REMOVAL      from right wrist     ENDOSCOPY, COLON, DIAGNOSTIC      GASTRIC BYPASS SURGERY  2005    HERNIA REPAIR      HYSTERECTOMY      age 30    OTHER SURGICAL HISTORY  CYSTOSCOPY, RIGHT URETERAL STONE MANIPULATION WITH RIGHT    SHOULDER ARTHROPLASTY Left 12/15/2016    LEFT PROXIMAL HUMERUS OPEN REDUCTION INTERNAL FIXATION     TONSILLECTOMY Pre-Sedation:  Pre-Sedation Documentation and Exam:  I have personally completed a history, physical exam & review of systems for this patient (see notes). Prior History of Anesthesia Complications:   none    Modified Mallampati:  III (soft palate, base of uvula visible)    ASA Classification:  Class 3 - A patient with severe systemic disease that limits activity but is not incapacitating    Georges Scale: Activity:  2 - Able to move 4 extremities voluntarily on command  Respiration:  2 - Able to breathe deeply and cough freely  Circulation:  2 - BP+/- 20mmHg of normal  Consciousness:  2 - Fully awake  Oxygen Saturation (color):  2 - Able to maintain oxygen saturation >92% on room air    Sedation/Anesthesia Plan:  Guard the patient's safety and welfare. Minimize physical discomfort and pain. Minimize negative psychological responses to treatment by providing sedation and analgesia and maximize the potential amnesia. Patient to meet pre-procedure discharge plan.     Medication Planned:  midazolam intravenously and fentanyl intravenously    Patient is an appropriate candidate for plan of sedation:   yes      Electronically signed by Shaneka Olmstead MD on 9/30/2021 at 3:06 AM

## 2021-09-30 NOTE — ED PROVIDER NOTES
CHIEF COMPLAINT  Chest Pain (Pt recently admitted for CHF and pna. Pt states CP and SOB worse today. Pt received one dose of SL Nitro in route which brought her pain down from a 5 to a 1. Pt also received one duoneb. Pt arrives on 4L O2 NC which is what she was sent home on when discharged.) and Shortness of 611 Yosi ARTEAGA is a 66 y.o. female with a history of DM, HTN, CHF, CAD, asthma who presents to the ED complaining of chest pain. Patient was discharged from the hospital 2 days ago after a 9-day stay for respiratory failure and pneumonia. Reports onset of substernal chest pain 2 hours prior to arrival.  She has associated dyspnea. Reports pain was severe but then improved after receiving nitroglycerin with EMS. Current pain level 4 out of 10, nonradiating, denies any other exacerbating or relieving factors. No report of diaphoresis, palpitations, near syncope, nausea, vomiting. No other complaints, modifying factors or associated symptoms. I have reviewed the following from the nursing documentation.     Past Medical History:   Diagnosis Date    Arthritis     Asthma     Diabetes mellitus ([address]     type 2, takes PO meds    History of palpitations     Hyperlipidemia     Hypertension      Past Surgical History:   Procedure Laterality Date    CATARACT REMOVAL WITH IMPLANT Right 10/18/2017    entered to epic from h&P    CATARACT REMOVAL WITH IMPLANT Left 11/08/2017    CHOLECYSTECTOMY      COLONOSCOPY      CORONARY ANGIOPLASTY  10/30/14    CYST REMOVAL      from right wrist     ENDOSCOPY, COLON, DIAGNOSTIC      GASTRIC BYPASS SURGERY  2005    HERNIA REPAIR      HYSTERECTOMY      age 30    OTHER SURGICAL HISTORY  CYSTOSCOPY, RIGHT URETERAL STONE MANIPULATION WITH RIGHT    SHOULDER ARTHROPLASTY Left 12/15/2016    LEFT PROXIMAL HUMERUS OPEN REDUCTION INTERNAL FIXATION     TONSILLECTOMY       Family History   Problem Relation Age of Onset    Heart Krystal Lozano MD 8.4 mL/hr at 09/30/21 0205 840 Units/hr at 09/30/21 0205     Current Outpatient Medications   Medication Sig Dispense Refill    promethazine (PHENERGAN) 12.5 MG tablet Take 12.5 mg by mouth every 6 hours as needed for Nausea      aspirin 81 MG chewable tablet Take 1 tablet by mouth daily 30 tablet 1    simvastatin (ZOCOR) 20 MG tablet Take 1 tablet by mouth nightly 30 tablet 1    lisinopril (PRINIVIL;ZESTRIL) 5 MG tablet Take 1 tablet by mouth daily 30 tablet 1    metoprolol succinate (TOPROL XL) 50 MG extended release tablet Take 1 tablet by mouth daily 30 tablet 1    furosemide (LASIX) 40 MG tablet 40 mg bid for 3 days then 40 mg daily 45 tablet 1    magnesium oxide (MAGNESIUM-OXIDE) 400 (241.3 Mg) MG TABS tablet Take 1 tablet by mouth 2 times daily 120 tablet 3    levocetirizine (XYZAL) 5 MG tablet Take 5 mg by mouth nightly      Linagliptin (TRADJENTA PO) Take by mouth      Cyanocobalamin (VITAMIN B-12 PO) Take by mouth 2 times daily       VITAMIN D, CHOLECALCIFEROL, PO Take by mouth daily      Dulaglutide (TRULICITY) 1.5 DW/5.7MV SOPN Inject into the skin once a week      Blood Pressure Monitoring (B-D ASSURE BPM/AUTO ARM CUFF) MISC For essential hypertension 1 each 0    gabapentin (NEURONTIN) 100 MG capsule Take 500 mg by mouth nightly. She only takes at night because it makes her tired       tiZANidine (ZANAFLEX) 4 MG tablet Take 4 mg by mouth every 6 hours as needed.  metFORMIN (GLUCOPHAGE) 500 MG tablet Take 1,000 mg by mouth 2 times daily (with meals).  albuterol (PROVENTIL HFA;VENTOLIN HFA) 108 (90 BASE) MCG/ACT inhaler Inhale 2 puffs into the lungs every 4 hours as needed.        Allergies   Allergen Reactions    Morphine Other (See Comments)     Chest pain    Sulfamethoxazole      Other reaction(s): Nausea/vomit    Trimethoprim      Other reaction(s): Nausea/vomit    Alendronate Other (See Comments)     Chest pain     Bactrim [Sulfamethoxazole-Trimethoprim]     Buspirone      Other reaction(s): Other (See Comments)  Gi upset     Calcitonin (Douglas)      Other reaction(s): Other (See Comments)  Headsaches     Demerol Hcl [Meperidine]     Dust Mite Extract     Esomeprazole Magnesium      Other reaction(s): Other (See Comments)  ELEVATED BP    Glipizide Other (See Comments)     Hypoglycemia    Metformin Diarrhea    Mometasone     Omeprazole      Other reaction(s): Other (See Comments)  Sick to stomach     Other Other (See Comments)     Trazadone- vomiting     Oxycodone      Other reaction(s): Nausea;Vomitting    Oxycodone-Acetaminophen     Pcn [Penicillins]      Other reaction(s): Trouble Breathing  Nausea      Percocet [Oxycodone-Acetaminophen]      Nausea      Prednisone     Propoxyphene     Ranitidine      Other reaction(s): Nausea    Sulfa Antibiotics     Toradol [Ketorolac Tromethamine] Other (See Comments)     Muscle spasms in chest    Tramadol      Other reaction(s): Other (See Comments)  Dizziness     Zantac [Ranitidine Hcl]        REVIEW OF SYSTEMS  10 systems reviewed, pertinent positives per HPI otherwise noted to be negative. PHYSICAL EXAM  BP (!) 154/60   Pulse 80   Temp 98.7 °F (37.1 °C) (Oral)   Resp 20   Ht 5' 3\" (1.6 m)   Wt 154 lb (69.9 kg)   SpO2 100%   BMI 27.28 kg/m²   GENERAL APPEARANCE: Awake and alert. Cooperative. Appears elderly, frail, chronically ill, uncomfortable but nontoxic. HEAD: Normocephalic. Atraumatic. EYES: PERRL. EOM's grossly intact. ENT: Mucous membranes are moist.   NECK: Supple, trachea midline. HEART: RRR. Normal S1, S2. No murmurs, rubs or gallops. LUNGS: Respirations unlabored. Moderate air movement, faint coarse breath sounds at bilateral lung bases. No coughing observed. Speaking comfortably in full sentences. ABDOMEN: Soft. Non-distended. Non-tender. No guarding or rebound. Normal Bowel sounds. EXTREMITIES: No peripheral edema. MAEE.  No acute deformities. SKIN: Warm and dry. No acute rashes. NEUROLOGICAL: Alert and interactive. CN II-XII intact. No gross facial drooping. Strength 5/5, sensation intact. No pronator drift. Normal coordination. PSYCHIATRIC: Normal mood and affect. LABS  I have reviewed all labs for this visit.    Results for orders placed or performed during the hospital encounter of 85/58/21   Basic Metabolic Panel   Result Value Ref Range    Sodium 135 (L) 136 - 145 mmol/L    Potassium 3.9 3.5 - 5.1 mmol/L    Chloride 94 (L) 99 - 110 mmol/L    CO2 28 21 - 32 mmol/L    Anion Gap 13 3 - 16    Glucose 144 (H) 70 - 99 mg/dL    BUN 18 7 - 20 mg/dL    CREATININE 1.0 0.6 - 1.2 mg/dL    GFR Non-African American 54 (A) >60    GFR African American >60 >60    Calcium 9.8 8.3 - 10.6 mg/dL   Brain Natriuretic Peptide   Result Value Ref Range    Pro-BNP 3,351 (H) 0 - 124 pg/mL   CBC Auto Differential   Result Value Ref Range    WBC 5.8 4.0 - 11.0 K/uL    RBC 3.83 (L) 4.00 - 5.20 M/uL    Hemoglobin 11.0 (L) 12.0 - 16.0 g/dL    Hematocrit 34.3 (L) 36.0 - 48.0 %    MCV 89.6 80.0 - 100.0 fL    MCH 28.7 26.0 - 34.0 pg    MCHC 32.0 31.0 - 36.0 g/dL    RDW 22.4 (H) 12.4 - 15.4 %    Platelets 756 664 - 538 K/uL    MPV 7.7 5.0 - 10.5 fL    Neutrophils % 85.8 %    Lymphocytes % 8.1 %    Monocytes % 4.4 %    Eosinophils % 1.2 %    Basophils % 0.5 %    Neutrophils Absolute 5.0 1.7 - 7.7 K/uL    Lymphocytes Absolute 0.5 (L) 1.0 - 5.1 K/uL    Monocytes Absolute 0.3 0.0 - 1.3 K/uL    Eosinophils Absolute 0.1 0.0 - 0.6 K/uL    Basophils Absolute 0.0 0.0 - 0.2 K/uL   Troponin   Result Value Ref Range    Troponin 0.01 <0.01 ng/mL   Blood Gas, Venous   Result Value Ref Range    pH, Sergio 7.518 (H) 7.350 - 7.450    pCO2, Sergio 34.9 (L) 40.0 - 50.0 mmHg    pO2, Sergio 37.6 25 - 40 mmHg    HCO3, Venous 27.7 23.0 - 29.0 mmol/L    Base Excess, Sergio 4.8 (H) -3.0 - 3.0 mmol/L    O2 Sat, Sergio 74 Not Established %    Carboxyhemoglobin 2.7 (H) 0.0 - 1.5 %    MetHgb, Sergio 0.3 <1.5 % TC02 (Calc), Sergio 29 Not Established mmol/L    O2 Therapy Unknown    Protime-INR   Result Value Ref Range    Protime 15.3 (H) 9.9 - 12.7 sec    INR 1.34 (H) 0.88 - 1.12   APTT   Result Value Ref Range    aPTT 24.9 (L) 26.2 - 38.6 sec   Magnesium   Result Value Ref Range    Magnesium 1.80 1.80 - 2.40 mg/dL   Sample possible blood bank testing   Result Value Ref Range    Specimen Status MELITON    EKG 12 Lead   Result Value Ref Range    Ventricular Rate 73 BPM    Atrial Rate 73 BPM    P-R Interval 142 ms    QRS Duration 114 ms    Q-T Interval 554 ms    QTc Calculation (Bazett) 610 ms    P Axis 46 degrees    R Axis -27 degrees    T Axis 117 degrees    Diagnosis       Normal sinus rhythmSeptal infarct (cited on or before 19-SEP-2021)Cannot rule out Inferior infarct , age undeterminedT wave abnormality, consider anterolateral ischemiaProlonged QTAbnormal ECGWhen compared with ECG of 20-SEP-2021 08:01,Serial changes of Septal infarct Present       EKG  Normal sinus rhythm, rate 73, left axis deviation, incomplete left bundle pattern, QTC prolonged at 610, new anterior ST elevations and new anterolateral T wave inversions compared with prior from 9/20/2021      RADIOLOGY  X-RAYS:  I have reviewed radiologic plain film image(s). ALL OTHER NON-PLAIN FILM IMAGES SUCH AS CT, ULTRASOUND AND MRI HAVE BEEN READ BY THE RADIOLOGIST. XR CHEST PORTABLE   Final Result   Pneumonia in the left lower lung are still present. There is improvement in   the right base. Rechecks: Physical assessment performed. Unchanged      Critical Care: 32min. Management of ACS with Brilinta, IV heparin, consultation with interventional cardiology        ED COURSE/MDM  Patient seen and evaluated. Old records reviewed. Labs and imaging reviewed and results discussed with patient. Patient with substernal chest pain with new EKG changes in the anterolateral leads concerning for ACS.   Arrived extremely hypotensive after having received nitroglycerin with EMS. Hypotension resolved with IV fluids. Case was promptly discussed with interventional cardiology. Patient started on aspirin, Brilinta, heparin and Cath Lab activated. New Prescriptions    No medications on file       CLINICAL IMPRESSION  1. ACS (acute coronary syndrome) (HCC)        Blood pressure (!) 154/60, pulse 80, temperature 98.7 °F (37.1 °C), temperature source Oral, resp. rate 20, height 5' 3\" (1.6 m), weight 154 lb (69.9 kg), SpO2 100 %, not currently breastfeeding. Lobo was sent to the Cath Lab in stable condition.         Darryl Kahn MD  09/30/21 8248

## 2021-09-30 NOTE — H&P
Hospital Medicine History & Physical      PCP: Kleber Kami    Date of Admission: 9/30/2021    Date of Service: Pt seen/examined on 9/30/2021  Pt seen/examined face to face on and admitted as inpatient with expected LOS greater than two midnights due to medical therapy    Chief Complaint:    Chief Complaint   Patient presents with    Chest Pain     Pt recently admitted for CHF and pna. Pt states CP and SOB worse today. Pt received one dose of SL Nitro in route which brought her pain down from a 5 to a 1. Pt also received one duoneb. Pt arrives on 4L O2 NC which is what she was sent home on when discharged.  Shortness of Breath        History Of Present Illness:      66 y.o. female who presented to [facility] with PMH of asthma, type 2 diabetes, hypertension, hyperlipidemia presented to the ED for diagnosis of chest pain and shortness of breath. Patient is encephalopathic on my examination thus history is limited    Patient was discharged from the hospital 3 days ago respiratory failure and pneumonia. Patient came to the ED for worsening chest pain reported substernal 2 hours before onset otherwise no dyspnea/on no exacerbating factor no alleviating factor nonradiating no association of nausea vomiting. Patient was noted to have new anterior lateral leads for ACS and received aspirin Brilinta heparin and went to Cath Lab. After Cath Lab patient was noted to have normal coronary arteries with mild left ventricular systolic dysfunction with suspicion of Takotsubo cardiomyopathy? .  Patient was continued on enteric aspirin 81 daily and beta-blocker. I was paged to come to the bedside as patient is encephalopathic reported patient has been encephalopathic. Patient came to try to get out of bed being confused and reporting that she wants to leave [address] I spoke with daughter who is the POA and reported that she is agreeable to pursue medication to treat the patient.   Patient has been refusing medications. Past Medical History:          Diagnosis Date    Arthritis     Asthma     Diabetes mellitus ([facility] [address]     type 2, takes PO meds    History of palpitations     Hyperlipidemia     Hypertension        Past Surgical History:          Procedure Laterality Date    CATARACT REMOVAL WITH IMPLANT Right 10/18/2017    entered to epic from h&P    CATARACT REMOVAL WITH IMPLANT Left 11/08/2017    CHOLECYSTECTOMY      COLONOSCOPY      CORONARY ANGIOPLASTY  10/30/14    CYST REMOVAL      from right wrist     ENDOSCOPY, COLON, DIAGNOSTIC      GASTRIC BYPASS SURGERY  2005    HERNIA REPAIR      HYSTERECTOMY      age 30    OTHER SURGICAL HISTORY  CYSTOSCOPY, RIGHT URETERAL STONE MANIPULATION WITH RIGHT    SHOULDER ARTHROPLASTY Left 12/15/2016    LEFT PROXIMAL HUMERUS OPEN REDUCTION INTERNAL FIXATION     TONSILLECTOMY         Medications Prior to Admission:      Prior to Admission medications    Medication Sig Start Date End Date Taking?  Authorizing Provider   promethazine (PHENERGAN) 12.5 MG tablet Take 12.5 mg by mouth every 6 hours as needed for Nausea   Yes Historical Provider, MD   furosemide (LASIX) 40 MG tablet Take 40 mg by mouth daily   Yes Historical Provider, MD   empagliflozin (JARDIANCE) 25 MG tablet Take 25 mg by mouth every morning 4/23/19  Yes Historical Provider, MD   aspirin 81 MG chewable tablet Take 1 tablet by mouth daily 9/28/21  Yes JENN Humphreys CNP   simvastatin (ZOCOR) 20 MG tablet Take 1 tablet by mouth nightly 9/28/21  Yes JENN Humphreys CNP   lisinopril (PRINIVIL;ZESTRIL) 5 MG tablet Take 1 tablet by mouth daily 9/28/21  Yes JENN Humphreys CNP   metoprolol succinate (TOPROL XL) 50 MG extended release tablet Take 1 tablet by mouth daily 9/29/21  Yes JENN Humphreys CNP   magnesium oxide (MAGNESIUM-OXIDE) 400 (241.3 Mg) MG TABS tablet Take 1 tablet by mouth 2 times daily 6/28/19  Yes Mynor Munson MD   levocetirizine (XYZAL) 5 MG tablet Take 5 mg by mouth nightly   Yes Historical Provider, MD   Cyanocobalamin (VITAMIN B-12 PO) Take by mouth 2 times daily    Yes Historical Provider, MD   VITAMIN D, CHOLECALCIFEROL, PO Take by mouth daily   Yes Historical Provider, MD   Dulaglutide (TRULICITY) 3 IC/0.4WU SOPN Inject 3 mg into the skin once a week    Yes Historical Provider, MD   Blood Pressure Monitoring (B-D ASSURE BPM/AUTO ARM CUFF) MISC For essential hypertension 11/11/15  Yes Alen Florence MD   gabapentin (NEURONTIN) 800 MG tablet Take 800 mg by mouth 3 times daily. Yes Historical Provider, MD   tiZANidine (ZANAFLEX) 4 MG tablet Take 4 mg by mouth every 6 hours as needed. Yes Historical Provider, MD   metFORMIN (GLUCOPHAGE) 500 MG tablet Take 1,000 mg by mouth 2 times daily (with meals). Yes Historical Provider, MD   albuterol (PROVENTIL HFA;VENTOLIN HFA) 108 (90 BASE) MCG/ACT inhaler Inhale 2 puffs into the lungs every 4 hours as needed. Yes Historical Provider, MD       Allergies:  Morphine, Sulfamethoxazole, Trimethoprim, Alendronate, Bactrim [sulfamethoxazole-trimethoprim], Buspirone, Calcitonin (salmon), Demerol hcl [meperidine], Dust mite extract, Esomeprazole magnesium, Glipizide, Metformin, Mometasone, Omeprazole, Other, Oxycodone, Oxycodone-acetaminophen, Pcn [penicillins], Percocet [oxycodone-acetaminophen], Prednisone, Propoxyphene, Ranitidine, Sulfa antibiotics, Toradol [ketorolac tromethamine], Tramadol, and Zantac [ranitidine hcl]    Social History:          TOBACCO:   reports that she has never smoked. She has never used smokeless tobacco.  ETOH:   reports no history of alcohol use.   E-Cigarettes/Vaping Use     Questions Responses    E-Cigarette/Vaping Use Never User    Start Date     Passive Exposure     Quit Date     Counseling Given     Comments             Family History:      Reviewed in detail         Problem Relation Age of Onset    Heart Attack Maternal Grandmother        REVIEW OF SYSTEMS: Unable to obtain accurately as patient is encephalopathic  PHYSICAL EXAM PERFORMED:    BP (!) 160/75   Pulse 82   Temp 98.1 °F (36.7 °C) (Oral)   Resp 18   Ht 5' 3\" (1.6 m)   Wt 154 lb (69.9 kg)   SpO2 100%   BMI 27.28 kg/m²     General appearance:  mild acute distress, appears older than stated age  HEENT:   atraumatic, sclera anicteric, Conjunctivae clear. Neck: Supple,Trachea midline, no goiter  Respiratory:minimal accessory muscle usage, Normal respiratory effort. rhonchi bilaterally without wheezing  Cardiovascular:  Regular rate and rhythm, capillary refill 2 seconds  Abdomen: Soft, non-tender, non-distended with normal bowel sounds. Musculoskeletal:  No clubbing, cyanosis. No edema LE bilaterally. Skin: turgor normal.  No new rashes or lesions. Neurologic: Alert and oriented x1, does not participate in exam but when observing patient does have upper extremity strength lifting herself up to a sitting up position  Labs:     Recent Labs     09/28/21 0522 09/30/21 0138 09/30/21  0426   WBC 3.4* 5.8 5.7   HGB 11.0* 11.0* 11.7*   HCT 34.6* 34.3* 36.3    257 241     Recent Labs     09/28/21 0522 09/30/21 0138    135*   K 4.2 3.9   CL 98* 94*   CO2 31 28   BUN 10 18   CREATININE 0.8 1.0   CALCIUM 8.7 9.8     No results for input(s): AST, ALT, BILIDIR, BILITOT, ALKPHOS in the last 72 hours.   Recent Labs     09/30/21 0138   INR 1.34*     Recent Labs     09/30/21 0138   TROPONINI 0.01       Urinalysis:      Lab Results   Component Value Date    NITRU Negative 09/19/2021    WBCUA 0-2 09/19/2021    RBCUA 0-2 09/19/2021    BLOODU TRACE-INTACT 09/19/2021    SPECGRAV 1.010 09/19/2021    GLUCOSEU >=1000 09/19/2021       Radiology:     CXR: I have reviewed the CXR with the following interpretation:   Pneumonia in the left lower lungan improvement on the right base  EKG:  I have reviewed the EKG with the following interpretation:   Normal sinus rhythm with     XR CHEST PORTABLE   Final Result   Pneumonia in the left lower lung are still present. There is improvement in   the right base. CT HEAD WO CONTRAST    (Results Pending)       ASSESSMENT AND PLAN:    Active Hospital Problems    Diagnosis Date Noted    Abnormal ECG [R94.31]     Chest pain [R07.9] 10/21/2014     Acute encephalopathy:  Etiology unclear at this time  CT head pending   labs, urinalysis UDS,  Patient was given multiple Ativan for agitation  Patient continued kept getting out of bed and putting herself in danger despite multiple attempts to orientation, sitter placed. I also did speak with family and updated on the status. Unable to give antipsychotics due to QTC being significantly prolonged    Chest pain s/p catherization:   interventional cardiology, much appreciated  Patient was found to have normal coronary arteries with mild LV systolic dysfunction    HFrEF last echo on 09/22/2020: not in exacerbation. Patient with significant improvement in EF after catheterization suspect Takotsubo?     Normocytic anemia: No signs or symptoms of bleeding    Hypomagnesemia: Replaced for a goal above 2    Diet: NPO except meds ordered    DVT Prophylaxis: Held pending head CT    Dispo:   Expected LOS greater than two [address] [place] [address], DO

## 2021-09-30 NOTE — ED NOTES
Pt moved from ED room 7 to ED room 4 after obtaining EKG. Dr. Patricia Leonard at bedside assessing pt at time of EKG. Aware of pts low BP; see new fluid orders. Pt placed on bedside telemetry and [place]. Two PIVs established. All clothing removed and pt placed in hospital gown and socks.       Adriano Hensley RN  09/30/21 8163

## 2021-09-30 NOTE — CONSULTS
[address] [facility]  [phone]        Reason for Consultation/Chief Complaint: \" Abnormal ECG, chest pain. \"    History of Present Illness:  Mervat Galvan is a 66 y.o. patient who presented to the hospital with complaints of chest pain and shortness of breath. She was recently discharged 9/28/2021 after a 9-day hospitalization for CHF. In the ER ECG was obtained and revealed new changes in the anterior wall compared to ECG about 10 days prior. Past Medical History:   has a past medical history of Arthritis, Asthma, Diabetes mellitus ([address] History of palpitations, Hyperlipidemia, and Hypertension. Surgical History:   has a past surgical history that includes Cholecystectomy; Gastric bypass surgery (2005); Hysterectomy; hernia repair; cyst removal; Tonsillectomy; Colonoscopy; Endoscopy, colon, diagnostic; Coronary angioplasty (10/30/14); other surgical history (CYSTOSCOPY, RIGHT URETERAL STONE MANIPULATION WITH RIGHT); Total shoulder arthroplasty (Left, 12/15/2016); Cataract removal with implant (Right, 10/18/2017); and Cataract removal with implant (Left, 11/08/2017). Social History:   reports that she has never smoked. She has never used smokeless tobacco. She reports that she does not drink alcohol and does not use drugs. Family History:  family history includes Heart Attack in her maternal grandmother. Home Medications:  Were reviewed and are listed in nursing record. and/or listed below  Prior to Admission medications    Medication Sig Start Date End Date Taking?  Authorizing Provider   promethazine (PHENERGAN) 12.5 MG tablet Take 12.5 mg by mouth every 6 hours as needed for Nausea   Yes Historical Provider, MD   aspirin 81 MG chewable tablet Take 1 tablet by mouth daily 9/28/21  Yes Buffy Mccord, APRN - CNP   simvastatin (ZOCOR) 20 MG tablet Take 1 tablet by mouth nightly 9/28/21  Yes Buffy Mccord, APRN - CNP   lisinopril (PRINIVIL;ZESTRIL) 5 MG tablet Take 1 tablet by mouth daily 9/28/21  Yes JENN Strange CNP   metoprolol succinate (TOPROL XL) 50 MG extended release tablet Take 1 tablet by mouth daily 9/29/21  Yes JENN Strange CNP   furosemide (LASIX) 40 MG tablet 40 mg bid for 3 days then 40 mg daily 9/28/21  Yes Mery Brandyn BarthJENN CNP   magnesium oxide (MAGNESIUM-OXIDE) 400 (241.3 Mg) MG TABS tablet Take 1 tablet by mouth 2 times daily 6/28/19  Yes Melina Herrera MD   levocetirizine (XYZAL) 5 MG tablet Take 5 mg by mouth nightly   Yes Historical Provider, MD   Linagliptin (TRADJENTA PO) Take by mouth   Yes Historical Provider, MD   Cyanocobalamin (VITAMIN B-12 PO) Take by mouth 2 times daily    Yes Historical Provider, MD   VITAMIN D, CHOLECALCIFEROL, PO Take by mouth daily   Yes Historical Provider, MD   Dulaglutide (TRULICITY) 1.5 TW/4.3FF SOPN Inject into the skin once a week   Yes Historical Provider, MD   Blood Pressure Monitoring (B-D ASSURE BPM/AUTO ARM CUFF) MISC For essential hypertension 11/11/15  Yes Melina Herrera MD   gabapentin (NEURONTIN) 100 MG capsule Take 500 mg by mouth nightly. She only takes at night because it makes her tired    Yes Historical Provider, MD   tiZANidine (ZANAFLEX) 4 MG tablet Take 4 mg by mouth every 6 hours as needed. Yes Historical Provider, MD   metFORMIN (GLUCOPHAGE) 500 MG tablet Take 1,000 mg by mouth 2 times daily (with meals). Yes Historical Provider, MD   albuterol (PROVENTIL HFA;VENTOLIN HFA) 108 (90 BASE) MCG/ACT inhaler Inhale 2 puffs into the lungs every 4 hours as needed.    Yes Historical Provider, MD        Allergies:  Morphine, Sulfamethoxazole, Trimethoprim, Alendronate, Bactrim [sulfamethoxazole-trimethoprim], Buspirone, Calcitonin (salmon), Demerol hcl [meperidine], Dust mite extract, Esomeprazole magnesium, Glipizide, Metformin, Mometasone, Omeprazole, Other, Oxycodone, Oxycodone-acetaminophen, Pcn [penicillins], Percocet [oxycodone-acetaminophen], Prednisone, Propoxyphene, Ranitidine, Sulfa antibiotics, Toradol [ketorolac tromethamine], Tramadol, and Zantac [ranitidine hcl]     Review of Systems:   A complete review of systems has been reviewed and updated today and is negative except as noted in the history of present illness. Physical Examination:    Vitals:    09/30/21 0241   BP:    Pulse: 80   Resp: 20   Temp:    SpO2: 100%    Weight: 154 lb (69.9 kg)         General Appearance:  Alert, cooperative, no distress, appears stated age   Head:  Normocephalic, without obvious abnormality, atraumatic   Eyes:  EOMI, conjunctiva/corneas clear       Nose: Nares normal   Throat: Lips normal   Neck: Supple, symmetrical, trachea midline,  no carotid bruit or JVD       Lungs:   Clear anteriorly, respirations unlabored   Chest Wall:  No tenderness or deformity   Heart:  Regular rate and rhythm, S1, S2 normal, 2/6 systolic murmur, no rub or gallop   Abdomen:   Soft, non-tender, bowel sounds active all four quadrants,  no masses, no organomegaly           Extremities: Extremities normal, atraumatic, no cyanosis.   3+ to 4+ edema   Pulses: Diminished and symmetric   Skin: Skin color, texture, turgor normal, no rashes or lesions   Pysch: Normal mood and affect   Neurologic: Normal gross motor and sensory exam.         Labs  CBC:   Lab Results   Component Value Date    WBC 5.8 09/30/2021    RBC 3.83 09/30/2021    HGB 11.0 09/30/2021    HCT 34.3 09/30/2021    MCV 89.6 09/30/2021    RDW 22.4 09/30/2021     09/30/2021     CMP:    Lab Results   Component Value Date     09/30/2021    K 3.9 09/30/2021    K 4.2 09/28/2021    CL 94 09/30/2021    CO2 28 09/30/2021    BUN 18 09/30/2021    CREATININE 1.0 09/30/2021    GFRAA >60 09/30/2021    GFRAA >60 04/02/2013    AGRATIO 1.2 09/19/2021    LABGLOM 54 09/30/2021    GLUCOSE 144 09/30/2021    PROT 5.9 09/23/2021    CALCIUM 9.8 09/30/2021    BILITOT 0.6 09/23/2021    ALKPHOS 49 09/23/2021    AST 68 09/23/2021    ALT 34 09/23/2021     LIPIDS: No components found for: TOTAL CHOLESTEROL,  HDL,  LDL,  TRIGLYCERIDES  PT/INR:  No results found for: PTINR  Lab Results   Component Value Date    TROPONINI 0.01 09/30/2021       EKG:  I have reviewed EKG with the following interpretation:  Impression:    30-SEP-2021 01:29:10 [facility] ROUTINE RECORD  Normal sinus rhythm  Septal infarct (cited on or before 19-SEP-2021)  Cannot rule out Inferior infarct , age undetermined  T wave abnormality, consider anterolateral ischemia  Prolonged QT  Abnormal ECG  When compared with ECG of 20-SEP-2021 08:01,  Serial changes of Septal infarct Present    Imaging/Procedures:   Echo 9/22/2021:   Summary   The left ventricular systolic function is moderately reduced with an   ejection fraction of 30-35%. There is hypokinesis of the apex, apical lateral, apical septum, anterior,   anteroseptum and apical anterior walls. Grade I diastolic dysfunction with normal filling pressure. Changes noted from previous echo on 6- in left ventricular function. Mild mitral regurgitation. Assessment/Plan:  Principal Problem:    Chest pain  Plan: Currently. Active Problems:    Abnormal ECG  Plan: New changes. Plan cardiac catheterization. Thank you for allowing us to participate in the care of Evon Deal. Further evaluation will be based upon the patient's clinical course and testing results. All questions and concerns were addressed to the patient/family. Alternatives to my treatment were discussed. The note was completed using EMR. Every effort was made to ensure accuracy; however, inadvertent computerized transcription errors may be present.     Danie Escobedo M.D.

## 2021-09-30 NOTE — ED NOTES
Per Dr. Drea Astorga and Cardiology; Cath Lab being activated for STEMI. Called and updated pts Daughter, Slade Gracia, on plan of care per pt request. States she is on her way.      Clay Bella, RN  09/30/21 [facility], RN  09/30/21 1269

## 2021-09-30 NOTE — PROGRESS NOTES
0355: Pt arrived from cath lab s/p [facility], no intervention. R fem angiosealed. Alert and anxious- does not follow commands and is sitting up and bending legs. CHG bath completed, preventive dressings and purewick placed. 0407: R groin firm- manual pressure held.

## 2021-09-30 NOTE — PROGRESS NOTES
Still will not lie flat or keep RLE staight. Has required manual pressure to be held on groin several times. Dr. Ang Espinoza at bedside- order for up to 2.5mg of versed and 1.5mg of ativan as needed to keep pt from bending leg and calm enough for head CT.

## 2021-09-30 NOTE — PROGRESS NOTES
Pt. Oriented to room and call light. Pt. Alert to self at this time and on room air. Bed in lowest position.

## 2021-09-30 NOTE — ED NOTES
EKG taken @ [facility] Fink stated to call the Interventionalist   Paged Cardiologly Interventionalist @ C/ [address] @ [address] [place] [address] Cath lab @ [address] [place] [address]  09/30/21 4913

## 2021-09-30 NOTE — DISCHARGE INSTR - COC
Continuity of Care Form    Patient Name: Anastasia Ennis   :  [birth year]  MRN:  [id number]    [facility] date:  2021  Discharge date:  10/04/21     Code Status Order: Full Code   Advance Directives:      Admitting Physician:  Chayito aCr DO  PCP: Yousuf Epperson    Discharging Nurse: [facility] Unit/Room#: 4006/2225-50  Discharging Unit Phone Number: ***    Emergency Contact:   Extended Emergency Contact Information  Primary Emergency Contact: Anat Soler  Home Phone: [phone]  Relation: Child  Secondary Emergency Contact: Mandie Short  Home Phone: [phone]  Relation: Grandchild    Past Surgical History:  Past Surgical History:   Procedure Laterality Date    CATARACT REMOVAL WITH IMPLANT Right 10/18/2017    entered to epic from h&P    CATARACT REMOVAL WITH IMPLANT Left 2017    CHOLECYSTECTOMY      COLONOSCOPY      CORONARY ANGIOPLASTY  10/30/14    CYST REMOVAL      from right wrist     ENDOSCOPY, COLON, DIAGNOSTIC      GASTRIC BYPASS SURGERY  2005    HERNIA REPAIR      HYSTERECTOMY      age 30    OTHER SURGICAL HISTORY  CYSTOSCOPY, RIGHT URETERAL STONE MANIPULATION WITH RIGHT    SHOULDER ARTHROPLASTY Left 12/15/2016    LEFT PROXIMAL HUMERUS OPEN REDUCTION INTERNAL FIXATION     TONSILLECTOMY         Immunization History:   Immunization History   Administered Date(s) Administered    Influenza Virus Vaccine 10/03/2012, 2014, 2016    Pneumococcal Polysaccharide ([id number]) 10/03/2012       Active Problems:  Patient Active Problem List   Diagnosis Code    Chest pain R07.9    Hypertension I10    Hyperlipidemia E78.5    Gross hematuria R31.0    Obstructive uropathy N13.9    PVC (premature ventricular contraction) I49.3    Palpitations R00.2    Coronary artery disease involving native coronary artery of native heart without angina pectoris I25.10    Closed fracture of proximal end of left humerus S42.A    PNA (pneumonia) J18.9    Pneumonia J18.9    Abnormal ECG R94.31 Aortic valve sclerosis I35.8    SOB (shortness of breath) R06.02    Pulmonary infiltrates R91.8    Normocytic normochromic anemia D64.9    Thrombocytopenia (HCC) D69.6    Elevated LFTs R79.89    Cardiomyopathy (HCC) Z60.3    Diastolic dysfunction W92.70    Acute respiratory failure with hypoxia (HCC) J96.01    THEO (acute kidney injury) ([facility] [address] S44.1    Acute systolic congestive heart failure (HCC) I50.21       Isolation/Infection:   Isolation            No Isolation          Patient Infection Status       Infection Onset Added Last Indicated Last Indicated By Review Planned Expiration Resolved Resolved By    None active    Resolved    COVID-19 Rule Out 09/21/21 09/21/21 09/21/21 COVID-19, Rapid (Ordered)   09/21/21 Rule-Out Test Resulted    COVID-19 Rule Out  09/20/21 09/20/21 Livia Almonte RN   09/20/21 Rule-Out Test Resulted    COVID-19 Rule Out 09/19/21 09/19/21 09/19/21 COVID-19, Rapid (Ordered)   09/19/21 Rule-Out Test Resulted            Nurse Assessment:  Last Vital Signs: BP (!) 126/52   Pulse 66   Temp 97.6 °F (36.4 °C) (Axillary)   Resp 18   Ht 5' 3\" (1.6 m)   Wt 154 lb (69.9 kg)   SpO2 96%   BMI 27.28 kg/m²     Last documented pain score (0-10 scale): Pain Level: 5  Last Weight:   Wt Readings from Last 1 Encounters:   09/30/21 154 lb (69.9 kg)     Mental Status:  {IP PT MENTAL STATUS:86516}    IV Access:  { JOSE JUAN IV ACCESS:957398970}    Nursing Mobility/ADLs:  Walking   {[facility] DME KPHO:798665045}  Transfer  {[facility] DME UWEN:598822760}  Bathing  {[facility] DME DQKW:155996110}  Dressing  {[facility] DME EBTS:988528252}  Toileting  {[facility] DME VVLP:272446873}  Feeding  {[facility] QAGM:989008505}  Med Admin  {[facility] ENMS:892566121}  Med Delivery   {[facility] MED Delivery:087797093}    Wound Care Documentation and Therapy:  Incision 12/15/16 Shoulder Lateral;Left (Active)   Number of days: 4370        Elimination:  Continence:    Bowel: {YES / DP:70191}  Bladder: {YES / LM:08915}  Urinary Catheter: {Urinary Catheter:439553965} Colostomy/Ileostomy/Ileal Conduit: {YES / ZH:17658}       Date of Last BM: ***    Intake/Output Summary (Last 24 hours) at 2021 0934  Last data filed at 2021 0445  Gross per 24 hour   Intake 1000 ml   Output 300 ml   Net 700 ml     I/O last 3 completed shifts:   In: 1000 [IV Piggyback:1000]  Out: 300 [Urine:300]    Safety Concerns:     [address] Safety Concerns:264843111}    Impairments/Disabilities:      [address] Impairments/Disabilities:862128021}    Nutrition Therapy:  Current Nutrition Therapy:   [address] Diet List:135355002}    Routes of Feeding: {CHP DME Other Feedings:246022313}  Liquids: {Slp liquid thickness:48887}  Daily Fluid Restriction: {CHP DME Yes amt example:137401006}  Last Modified Barium Swallow with Video (Video Swallowing Test): {Done Not Done RJHE:276914340}    Treatments at the Time of Hospital Discharge:   Respiratory Treatments: ***  Oxygen Therapy:  {Therapy; copd oxygen:36832}  Ventilator:    { CC Vent AHSM:580184654}    Rehab Therapies: {THERAPEUTIC INTERVENTION:2050968738}  Weight Bearing Status/Restrictions: [address] [employer]  Weight Bearin}  Other Medical Equipment (for information only, NOT a DME order):  {EQUIPMENT:636996999}  Other Treatments: ***    Patient's personal belongings (please select all that are sent with patient):  {[facility] DME Belongings:000898894}    RN SIGNATURE:  {Esignature:868237691}    CASE MANAGEMENT/SOCIAL WORK SECTION    Inpatient Status Date: 21    Readmission Risk Assessment Score:  Readmission Risk              Risk of Unplanned Readmission:  15         Discharging to Facility/ Agency   Name: [facility]  Address:  Phone: [phone]  Fax: [phone]    / signature: Electronically signed by Mehdi Macario RN on 10/4/21 at 2:14 PM EDT    PHYSICIAN SECTION    Prognosis: Good    Condition at Discharge: Stable    Rehab Potential (if transferring to Rehab): Good    Recommended Labs or Other Treatments After Discharge: Follow-up with nephrology in 3 to 4 weeks  Follow up with PCP after d/c from [facility]     Physician Certification: I certify the above information and transfer of Raoul Heredia  is necessary for the continuing treatment of the diagnosis listed and that she requires [place] for less 30 days. Update Admission H&P: No change in H&P  Recommended Follow-up, Labs or Other Treatments After Discharge:     Follow-up with nephrology in 3 to 4 weeks  Follow up with PCP after d/c from [facility]            PHYSICIAN SIGNATURE:  Electronically signed by Fawad Serrato MD on 10/4/21 at 2:34 PM EDT

## 2021-09-30 NOTE — ED NOTES
Bedside report given to Cath Lab RNs at this time. Pt left ED via stretcher at this time in stable condition. All belongings sent with pt. Care transferred.      Thai Lion RN  09/30/21 3888

## 2021-09-30 NOTE — ACP (ADVANCE CARE PLANNING)
Advance Care Planning   Advance Care Planning Clinical Specialist  Conversation Note      Date of ACP Conversation: 9/30/2021    Conversation Conducted with:   Daughter Carlos Armstrong, who lives with patient     Next of Kin by law (only applies in absence of above)    Patient has no AD's. Patient has 2 daughters and 1 son. I explained to Randall Rider that, in the absence of AD's, all three would need to agree on serious health care decisions. ACP Clinical Specialist: Ramon Osborn, RN      *When Decision Maker makes decisions on behalf of the incapacitated patient: Decision Maker is asked to consider and make decisions based on patient values, known preferences, or best interests. Current Designated Health Care Decision Maker:   Primary Decision Maker: Honorio Gore - [phone]    Secondary Decision Maker: Victor M Salazar - [phone]    Supplemental (Other) Decision Maker: Harrison Gtz - [phone]  (as entered in [employer] field. Validate  this information as still accurate & up-to-date; edit [employer] field as needed.)   Can this person be reached and be able to respond quickly, such as within a few minutes or hours? Yes     For below questions, when conducting conversation with [employer], substitute \"she\" and \"her\" for \"you\" and \"your\". Hospitalization  If your health were to worsen and it became clear that your chance of recovery was unlikely, what would your preferences be regarding hospitalization?:    Choice:  [x]  The patient would want hospitalization  []  The patient would prefer comfort-focused treatment without hospitalization. Ventilation  If you were in your present state of health and suddenly became very ill and were unable to breathe on your own, what would your preference be about the use of a ventilator (breathing machine) if it were available to you?       If patient would desire the use of a ventilator (breathing machine), answer \"yes\", if not \"no\":yes    If your health were to worsen and it became clear that your chance of recovery was unlikely, would that change your answer? Yes    Resuscitation  CPR works best to restart the heart when there is a sudden event, like a heart attack, in someone who is otherwise healthy. Unfortunately, CPR does not typically restart the heart for people who have serious health conditions or who are very sick. In the event your heart stopped, would you want attempts to restart your heart (answer \"yes\") or would you prefer a natural death (answer \"no\")? yes    If your health were to worsen and it became clear that your chance of recovery was unlikely, would that change your answer? Yes    [x] Yes  [] No   Educated Patient / [facility] regarding differences between Advance Directives and portable DNR orders.     Length of ACP Conversation in minutes:  20 minutes    Conversation Outcomes:  [x] ACP discussion completed  [] Existing advance directive reviewed with patient; no changes to patient's previously recorded wishes   [] New Advance Directive completed   [] Portable Do Not Rescitate prepared for Provider review and signature  [] POLST/POST/MOLST/MOST prepared for Provider review and signature      Follow-up plan:    [] Schedule follow-up conversation to continue planning  [] Referred individual to Provider for additional questions/concerns   [] Advised patient/agent/surrogate to review completed ACP document and update if needed with changes in condition, patient preferences or care setting     [] This note routed to one or more involved healthcare providers     Manisha Chaudhari RN  ACP Clinical Specialist

## 2021-09-30 NOTE — PROGRESS NOTES
Pt is now calm. Called CT and there are currently patients occupying the scanners and STAT scans need to be completed first. Pt remains stable.

## 2021-10-01 LAB
ANION GAP SERPL CALCULATED.3IONS-SCNC: 26 MMOL/L (ref 3–16)
ANION GAP SERPL CALCULATED.3IONS-SCNC: 26 MMOL/L (ref 3–16)
BUN BLDV-MCNC: 20 MG/DL (ref 7–20)
BUN BLDV-MCNC: 21 MG/DL (ref 7–20)
CALCIUM SERPL-MCNC: 9.2 MG/DL (ref 8.3–10.6)
CALCIUM SERPL-MCNC: 9.6 MG/DL (ref 8.3–10.6)
CHLORIDE BLD-SCNC: 94 MMOL/L (ref 99–110)
CHLORIDE BLD-SCNC: 96 MMOL/L (ref 99–110)
CO2: 18 MMOL/L (ref 21–32)
CO2: 19 MMOL/L (ref 21–32)
CREAT SERPL-MCNC: 1 MG/DL (ref 0.6–1.2)
CREAT SERPL-MCNC: 1.1 MG/DL (ref 0.6–1.2)
EKG ATRIAL RATE: 69 BPM
EKG DIAGNOSIS: NORMAL
EKG P AXIS: 55 DEGREES
EKG P-R INTERVAL: 148 MS
EKG Q-T INTERVAL: 522 MS
EKG QRS DURATION: 124 MS
EKG QTC CALCULATION (BAZETT): 559 MS
EKG R AXIS: -4 DEGREES
EKG T AXIS: 128 DEGREES
EKG VENTRICULAR RATE: 69 BPM
ESTIMATED AVERAGE GLUCOSE: 134.1 MG/DL
GFR AFRICAN AMERICAN: 59
GFR AFRICAN AMERICAN: >60
GFR NON-AFRICAN AMERICAN: 49
GFR NON-AFRICAN AMERICAN: 54
GLUCOSE BLD-MCNC: 105 MG/DL (ref 70–99)
GLUCOSE BLD-MCNC: 115 MG/DL (ref 70–99)
GLUCOSE BLD-MCNC: 136 MG/DL (ref 70–99)
GLUCOSE BLD-MCNC: 137 MG/DL (ref 70–99)
HBA1C MFR BLD: 6.3 %
HCT VFR BLD CALC: 32 % (ref 36–48)
HEMOGLOBIN: 10.5 G/DL (ref 12–16)
MCH RBC QN AUTO: 28.9 PG (ref 26–34)
MCHC RBC AUTO-ENTMCNC: 32.8 G/DL (ref 31–36)
MCV RBC AUTO: 88.1 FL (ref 80–100)
PDW BLD-RTO: 22.6 % (ref 12.4–15.4)
PERFORMED ON: ABNORMAL
PERFORMED ON: ABNORMAL
PLATELET # BLD: 299 K/UL (ref 135–450)
PMV BLD AUTO: 8 FL (ref 5–10.5)
POTASSIUM REFLEX MAGNESIUM: 3.6 MMOL/L (ref 3.5–5.1)
POTASSIUM SERPL-SCNC: 3.5 MMOL/L (ref 3.5–5.1)
RBC # BLD: 3.63 M/UL (ref 4–5.2)
SODIUM BLD-SCNC: 139 MMOL/L (ref 136–145)
SODIUM BLD-SCNC: 140 MMOL/L (ref 136–145)
WBC # BLD: 4.3 K/UL (ref 4–11)

## 2021-10-01 PROCEDURE — 6360000002 HC RX W HCPCS: Performed by: NURSE PRACTITIONER

## 2021-10-01 PROCEDURE — 97535 SELF CARE MNGMENT TRAINING: CPT

## 2021-10-01 PROCEDURE — 36415 COLL VENOUS BLD VENIPUNCTURE: CPT

## 2021-10-01 PROCEDURE — 80048 BASIC METABOLIC PNL TOTAL CA: CPT

## 2021-10-01 PROCEDURE — 97162 PT EVAL MOD COMPLEX 30 MIN: CPT

## 2021-10-01 PROCEDURE — 93005 ELECTROCARDIOGRAM TRACING: CPT | Performed by: NURSE PRACTITIONER

## 2021-10-01 PROCEDURE — 97166 OT EVAL MOD COMPLEX 45 MIN: CPT

## 2021-10-01 PROCEDURE — 97530 THERAPEUTIC ACTIVITIES: CPT

## 2021-10-01 PROCEDURE — 99232 SBSQ HOSP IP/OBS MODERATE 35: CPT | Performed by: NURSE PRACTITIONER

## 2021-10-01 PROCEDURE — 2580000003 HC RX 258: Performed by: INTERNAL MEDICINE

## 2021-10-01 PROCEDURE — 85027 COMPLETE CBC AUTOMATED: CPT

## 2021-10-01 PROCEDURE — 1200000000 HC SEMI PRIVATE

## 2021-10-01 PROCEDURE — 97110 THERAPEUTIC EXERCISES: CPT

## 2021-10-01 PROCEDURE — 6370000000 HC RX 637 (ALT 250 FOR IP): Performed by: INTERNAL MEDICINE

## 2021-10-01 PROCEDURE — 93010 ELECTROCARDIOGRAM REPORT: CPT | Performed by: INTERNAL MEDICINE

## 2021-10-01 PROCEDURE — 6370000000 HC RX 637 (ALT 250 FOR IP): Performed by: NURSE PRACTITIONER

## 2021-10-01 RX ORDER — FUROSEMIDE 40 MG/1
40 TABLET ORAL DAILY
Status: DISCONTINUED | OUTPATIENT
Start: 2021-10-02 | End: 2021-10-05 | Stop reason: HOSPADM

## 2021-10-01 RX ORDER — PROCHLORPERAZINE EDISYLATE 5 MG/ML
10 INJECTION INTRAMUSCULAR; INTRAVENOUS EVERY 6 HOURS PRN
Status: DISCONTINUED | OUTPATIENT
Start: 2021-10-01 | End: 2021-10-05 | Stop reason: HOSPADM

## 2021-10-01 RX ADMIN — LISINOPRIL 10 MG: 10 TABLET ORAL at 08:59

## 2021-10-01 RX ADMIN — ASPIRIN 81 MG: 81 TABLET, CHEWABLE ORAL at 08:59

## 2021-10-01 RX ADMIN — ENOXAPARIN SODIUM 40 MG: 40 INJECTION SUBCUTANEOUS at 17:42

## 2021-10-01 RX ADMIN — MAGNESIUM GLUCONATE 500 MG ORAL TABLET 400 MG: 500 TABLET ORAL at 08:59

## 2021-10-01 RX ADMIN — METOPROLOL SUCCINATE 50 MG: 50 TABLET, EXTENDED RELEASE ORAL at 08:59

## 2021-10-01 RX ADMIN — ATORVASTATIN CALCIUM 10 MG: 10 TABLET, FILM COATED ORAL at 08:59

## 2021-10-01 RX ADMIN — Medication 10 ML: at 20:25

## 2021-10-01 RX ADMIN — ACETAMINOPHEN 650 MG: 325 TABLET ORAL at 20:27

## 2021-10-01 RX ADMIN — Medication 10 ML: at 09:00

## 2021-10-01 RX ADMIN — ACETAMINOPHEN 650 MG: 325 TABLET ORAL at 00:20

## 2021-10-01 RX ADMIN — PROCHLORPERAZINE EDISYLATE 10 MG: 5 INJECTION INTRAMUSCULAR; INTRAVENOUS at 23:13

## 2021-10-01 RX ADMIN — MAGNESIUM GLUCONATE 500 MG ORAL TABLET 400 MG: 500 TABLET ORAL at 20:25

## 2021-10-01 RX ADMIN — FUROSEMIDE 40 MG: 40 TABLET ORAL at 08:59

## 2021-10-01 ASSESSMENT — PAIN SCALES - GENERAL
PAINLEVEL_OUTOF10: 8
PAINLEVEL_OUTOF10: 0
PAINLEVEL_OUTOF10: 8
PAINLEVEL_OUTOF10: 7
PAINLEVEL_OUTOF10: 0

## 2021-10-01 NOTE — PROGRESS NOTES
Hospitalist Progress Note      PCP: Salvador Anderson    Date of Admission: 9/30/2021    Chief Complaint: cp/sob    Hospital Course: 66 y.o. female who presented to [facility] with PMH of asthma, type 2 diabetes, hypertension, hyperlipidemia presented to the ED for diagnosis of chest pain and shortness of breath.     Patient is encephalopathic on my examination thus history is limited     Patient was discharged from the hospital 3 days ago respiratory failure and pneumonia. Patient came to the ED for worsening chest pain reported substernal 2 hours before onset otherwise no dyspnea/on no exacerbating factor no alleviating factor nonradiating no association of nausea vomiting. Patient was noted to have new anterior lateral leads for ACS and received aspirin Brilinta heparin and went to Cath Lab. After Cath Lab patient was noted to have normal coronary arteries with mild left ventricular systolic dysfunction with suspicion of Takotsubo cardiomyopathy? .  Patient was continued on enteric aspirin 81 daily and beta-blocker.     I was paged to come to the bedside as patient is encephalopathic reported patient has been encephalopathic. Patient came to try to get out of bed being confused and reporting that she wants to leave [address] I spoke with daughter who is the POA and reported that she is agreeable to pursue medication to treat the patient. Patient has been refusing medications.     Subjective: patient states she is weak, asking about going home      Medications:  Reviewed    Infusion Medications    sodium chloride      dextrose       Scheduled Medications    magnesium oxide  400 mg Oral BID    aspirin  81 mg Oral Daily    metoprolol succinate  50 mg Oral Daily    sodium chloride flush  5-40 mL IntraVENous 2 times per day    atorvastatin  10 mg Oral Daily    insulin lispro  0-6 Units SubCUTAneous Q4H    lisinopril  10 mg Oral Daily    furosemide  40 mg Oral BID     PRN Meds: Recent Labs     09/30/21  0138   TROPONINI 0.01       Urinalysis:      Lab Results   Component Value Date    NITRU Negative 09/19/2021    WBCUA 0-2 09/19/2021    RBCUA 0-2 09/19/2021    BLOODU TRACE-INTACT 09/19/2021    SPECGRAV 1.010 09/19/2021    GLUCOSEU >=1000 09/19/2021       Radiology:  CT HEAD WO CONTRAST   Final Result   No acute intracranial abnormality. Mild cerebral atrophy and mild-to-moderate chronic ischemic changes both   appropriate for age. XR CHEST PORTABLE   Final Result   Pneumonia in the left lower lung are still present. There is improvement in   the right base. Assessment/Plan:    Active Hospital Problems    Diagnosis     Acute on chronic systolic congestive heart failure (HCC) [I50.23]     Shortness of breath [R06.02]     Nonischemic cardiomyopathy (HCC) [I42.8]     Prolonged Q-T interval on ECG [R94.31]     Chest pain [R07.9]      Acute encephalopathy:  Etiology unclear at this time  CT head:No acute intracranial abnormality. Mild cerebral atrophy and mild-to-moderate chronic ischemic changes both appropriate for age  labs, urinalysis UDS,  Patient was given multiple Ativan for agitation  Patient continued kept getting out of bed and putting herself in danger despite multiple attempts to orientation, sitter placed. I also did speak with family and updated on the status. Unable to give antipsychotics due to QTC being significantly prolonged     Chest pain s/p catherization:   interventional cardiology, much appreciated  Patient was found to have normal coronary arteries with mild LV systolic dysfunction  -BB, lisionpril, lasix, asa, statin     HFrEF last echo on 09/22/2020: not in exacerbation. Patient with significant improvement in EF after catheterization suspect Takotsubo?     Normocytic anemia: No signs or symptoms of bleeding     Hypomagnesemia: Replaced for a goal above 2       DVT Prophylaxis: lovenox  Diet: ADULT DIET;  Regular; 3 carb choices (45 gm/meal);  Low Fat/Low Chol/High Fiber/MICHEL  Code Status: Full Code    PT/OT Eval Status: consulted    Dispo - pending course    JENN Durham - CNP

## 2021-10-01 NOTE — CARE COORDINATION
Met Pt at the bedside for SNF recs. Pt not sure she wants to go to a facility for rehab. She wants to discuss with Dtr first. This RN CM offered to call and discuss with Dtr. Thomasina Apgar, Pt Dtr contacted and updated on above. She will discuss SNF placement with her mother tonight and RN CM will need to f/u for decision tomorrow 10/2. List reviewed with Thomasina Apgar over telephone and she would prefer somewhere close to home. She is thinking about [facility] or [facility].

## 2021-10-01 NOTE — PROGRESS NOTES
Patient's EF (Ejection Fraction) is less than 40%    Heart Failure Medications:   Diuretics[de-identified] Furosemide     (One of the following REQUIRED for EF <40%/SYSTOLIC FAILURE but MAY be used in EF% >40%/DIASTOLIC FAILURE)        ACE[de-identified] Lisinopril        ARB[de-identified] None         ARNI[de-identified] None    (Beta Blockers)   NON- Evidenced Based Beta Blocker (for EF% >40%/DIASTOLIC FAILURE): None     Evidenced Based Beta Blocker::(REQUIRED for EF% <40%/SYSTOLIC FAILURE) Metoprolol SUCCinate- Toprol XL  . .................................................................................................................................................. Patient's weights and intake/output reviewed: Yes    Patient's Last Weight: 156 lbs obtained by bed scale. Pt. Unable to stand for weight. Difference of 0 lbs n/a than last documented weight. Intake/Output Summary (Last 24 hours) at 10/1/2021 0942  Last data filed at 10/1/2021 0859  Gross per 24 hour   Intake 180 ml   Output 2200 ml   Net -2020 ml       Comorbidities Reviewed Yes    Patient has a past medical history of Arthritis, Asthma, Diabetes mellitus ([facility] [address] History of palpitations, Hyperlipidemia, and Hypertension. >>For CHF and Comorbidity documentation on Education Time and Topics, please see Education Tab    Progressive Mobility Assessment:  What is this patient's Current Level of Mobility?: Ambulatory- with Assistance  How was this patient Mobilized today?: Unable to Mobilize and Patient Refuses to Mobilize                 With Whom? Nurse, PCA, PT, OT and Self                 Level of Difficulty/Assistance: 2x Assist     Pt resting in bed at this time on room air. Pt denies shortness of breath. Pt with nonpitting lower extremity edema.      Patient and/or Family's stated Goal of Care this Admission: reduce shortness of breath, increase activity tolerance, better understand heart failure and disease management, be more comfortable and reduce lower extremity edema prior to discharge        :

## 2021-10-01 NOTE — PROGRESS NOTES
Pt. Wanting to leave today. Pt. Less lethargic, alert and oriented to self, time, and place. Right fem site unremarkable. Per PCA pt. States she is going to leave AMA. Sitter remains at bedside, encouraged pt. To talk to physicians before leaving.

## 2021-10-01 NOTE — PROGRESS NOTES
Occupational Therapy   Occupational Therapy Initial Assessment  Date: 10/1/2021   Patient Name: Raoul Heredia  MRN: [id number]     : 1950    Date of Service: 10/1/2021    Discharge Recommendations:     OT Equipment Recommendations  Equipment Needed: No  Other: Defer    Assessment   Performance deficits / Impairments: Decreased functional mobility ; Decreased ADL status; Decreased endurance;Decreased strength;Decreased cognition;Decreased safe awareness;Decreased high-level IADLs;Decreased balance  Assessment: Pt is a 66 y.o. presenting with chest pain. Pt previously requiring assist for high level IADLs, but overall mostly independent. Pt with poor cognition noted during session, and was limited by endurance. Pt would benefit from continued acute OT/  Treatment Diagnosis: chest pain  Prognosis: Good  Decision Making: Medium Complexity  OT Education: OT Role;Plan of Care;Transfer Training  Patient Education: pt will require reptition  Barriers to Learning: cognition  REQUIRES OT FOLLOW UP: Yes  Activity Tolerance  Activity Tolerance: Patient limited by fatigue;Treatment limited secondary to medical complications (free text)  Activity Tolerance: Pt reporting she was dizziness throughout. /59 following stand. HR at 98. Pt increasingly lethargic as session progressed. Safety Devices  Safety Devices in place: Yes  Type of devices: Left in chair;Chair alarm in place; Patient at risk for falls;Call light within reach;Gait belt; All fall risk precautions in place;Nurse notified           Patient Diagnosis(es): The encounter diagnosis was ACS (acute coronary syndrome) ([facility] [address] has a past medical history of Arthritis, Asthma, Diabetes mellitus ([facility] [address] History of palpitations, Hyperlipidemia, and Hypertension. has a past surgical history that includes Cholecystectomy; Gastric bypass surgery ();  Hysterectomy; hernia repair; cyst removal; Tonsillectomy; Colonoscopy; Endoscopy, colon, diagnostic; Coronary angioplasty (10/30/14); other surgical history (CYSTOSCOPY, RIGHT URETERAL STONE MANIPULATION WITH RIGHT); Total shoulder arthroplasty (Left, 12/15/2016); Cataract removal with implant (Right, 10/18/2017); and Cataract removal with implant (Left, 11/08/2017). Treatment Diagnosis: chest pain      Restrictions  Restrictions/Precautions  Restrictions/Precautions: Fall Risk  Position Activity Restriction  Other position/activity restrictions: pure wick    Subjective   General  Chart Reviewed: Yes  Patient assessed for rehabilitation services?: Yes  Family / Caregiver Present: No  Diagnosis: Chest pain  Subjective  Subjective: Pt met bedside, agreeable to OT eval. Pt flat affect, continues to demonstrate slow processing. General Comment  Comments: RN ok to see. Patient Currently in Pain: Denies  Vital Signs  Patient Currently in Pain: Denies  Social/Functional History  Social/Functional History  Lives With: Family  Type of Home: House  Home Layout: Two level  Home Access: Stairs to enter without rails  Entrance Stairs - Number of Steps: 1  Bathroom Shower/Tub: Walk-in shower  Bathroom Toilet: Handicap height  Bathroom Equipment: Shower chair  Home Equipment: Rolling walker, Cane, [employer], [address] chair  ADL Assistance: Independent  Homemaking Responsibilities: No  Ambulation Assistance: Independent (with RW)  Active : No  Occupation: Retired       Objective        Orientation  Overall Orientation Status: Impaired  Orientation Level: Disoriented to time;Oriented to situation;Oriented to place;Oriented to person     Balance  Sitting Balance: Stand by assistance  Standing Balance: Contact guard assistance  Standing Balance  Time: 1 min x 3  Activity: functional mobility. ADL completion  Comment: pt reporting dizziness during each stand.   Functional Mobility  Functional - Mobility Device: Rolling Walker  Activity: Other  Assist Level: Minimal assistance  Functional Mobility Comments: Pt requiring assist to AROM (degrees)  Right Hand AROM: WFL  LUE Strength  Gross LUE Strength: WFL  RUE Strength  Gross RUE Strength: WFL         Second session:   Pt noted to be calling out, sitting on the edge of her recliner chair. Pt assisted with Min A and cueing for hand placement and RW management back to bed. Min A for sit to supine. Increased time to process all commands, appears to be increasingly confused. Pt had pulled off all heart monitors. Pt with difficulty following directions, eyes closed for a lot of session. Pt dependently scooted in bed. Pt left in bed with all needs within reach, alarm activated, RN aware.            Plan   Plan  Times per week: 3-5x  Current Treatment Recommendations: Strengthening, Endurance Training, Balance Training, Functional Mobility Training, Safety Education & Training, Self-Care / ADL, Home Management Training, Patient/Caregiver Education & Training      AM-PAC Score        AM-PAC Inpatient Daily Activity Raw Score: 15 (10/01/21 1452)  AM-PAC Inpatient ADL T-Scale Score : 34.69 (10/01/21 1452)  ADL Inpatient CMS 0-100% Score: 56.46 (10/01/21 1452)  ADL Inpatient CMS G-Code Modifier : CK (10/01/21 1452)    Goals  Short term goals  Time Frame for Short term goals: 1 week 10/8  Short term goal 1: Pt will complete toileting with Mod A by 10/4  Short term goal 2: Pt will complete LBD with min A  Short term goal 3: Pt will complete UBD with SBA  Short term goal 4: Pt will complete LB bathing with Min A  Patient Goals   Patient goals : go home       Therapy Time   Individual Concurrent Group Co-treatment   Time In 4223, 8881,         Time Out [id number]          Minutes 33, 15               Timed Code Treatment Minutes:  38 min   Total Treatment Minutes:  50 min       Tarsha Smart OT

## 2021-10-01 NOTE — PROGRESS NOTES
[facility]   Daily Progress Note    Admit Date:  9/30/2021  HPI:    Chief Complaint   Patient presents with    Chest Pain     Pt recently admitted for CHF and pna. Pt states CP and SOB worse today. Pt received one dose of SL Nitro in route which brought her pain down from a 5 to a 1. Pt also received one duoneb. Pt arrives on 4L O2 NC which is what she was sent home on when discharged.  Shortness of Breath      Bouchra Weinberg presented with chest pain and shortness of breath. EKG was abnormal.  She was taken urgently to the Cath Lab. LHC demonstrated no obstructive CAD, mildly elevated filling pressures and LVEF 45%. She was discharged just 2 days ago where she was admitted for shortness of breath, found to have new LBBB and LV dysfunction with EF 30-35% with anterior wall motion abnormalities. She was treated for pneumonia, THEO and metabolic acidosis. She was insistent on being discharged home even though was felt to be premature. She was discharged on Lasix 40 mg twice daily for 3 days for continued volume excess. She was also recommended to follow-up with Dr. Malinda Hernandez for cardiac catheterization which had been planned as outpatient. Subjective:  Ms. Ysabel Beckett seen up working with therapy in the room. He denies any shortness of breath or chest pain.   Again insisting on discharge home today    Objective:   Patient Vitals for the past 24 hrs:   BP Temp Temp src Pulse Resp SpO2 Weight   10/01/21 1214 (!) 126/54 97.8 °F (36.6 °C) Oral 71 18 94 % --   10/01/21 1120 (!) 117/58 97.5 °F (36.4 °C) Oral 69 20 96 % --   10/01/21 0741 (!) 113/49 98.1 °F (36.7 °C) Oral 69 20 96 % --   10/01/21 0421 (!) 114/53 97.7 °F (36.5 °C) Oral 75 16 95 % --   10/01/21 0323 -- -- -- -- -- -- 156 lb 12.8 oz (71.1 kg)   09/30/21 2358 (!) 115/55 97.4 °F (36.3 °C) Oral 81 18 95 % --   09/30/21 2000 (!) 107/45 98.4 °F (36.9 °C) Axillary 75 18 99 % --   09/30/21 1445 124/63 98.5 °F (36.9 °C) Axillary 74 16 97 % -- Intake/Output Summary (Last 24 hours) at 10/1/2021 1426  Last data filed at 10/1/2021 0953  Gross per 24 hour   Intake 180 ml   Output 1400 ml   Net -1220 ml     Wt Readings from Last 3 Encounters:   10/01/21 156 lb 12.8 oz (71.1 kg)   09/28/21 157 lb (71.2 kg)   06/17/19 120 lb 8 oz (54.7 kg)         ASSESSMENT:   1. Chest pain: Noncardiac  2. Nonischemic cardiomyopathy: EF 30-35% now improved to 45-50 % by LVG, possible Takotsubo Cardiomyopathy, on ACEi and evidence-based beta blocker   3. CAD: Mild, nonobstructive, continue ASA and statin  4. Dyspnea: mild elevated filling pressures suggestive of CHF  5. CHF, acute on chronic systolic: LVEDP 18, BNP down trending, weight same  6. HYPERTENSION: improved  7. Pneumonia  8. Prolonged QTC: avoid QT prolonging meds, monitor, improved today (560)        PLAN:  1. Change Lasix 40 mg po daily  2. Continue Toprol 50 mg daily and lisinopril 10 mg daily  3. Continue ASA and statin  Okay for discharge from cardiac perspective. Will review cardiac medications on discharge medication reconciliation form. Patient to follow up with Dr. Kailee Atwood.        Ivan Westfall, JENN - CNP, 10/1/2021, 2:26 PM  [facility]   [phone]       Telemetry: SR 60-80  NYHA: III    Physical Exam:  General:  Awake, alert, NAD  Skin:  Warm and dry  Neck:  JVP 8 cm at 30 degrees  Chest: Clear to auscultation   Cardiovascular:  RRR, normal S1S2, + murmur, no g/r  Abdomen:  Soft, nontender, +bowel sounds  Extremities:  No BLE edema      Medications:    magnesium oxide  400 mg Oral BID    aspirin  81 mg Oral Daily    metoprolol succinate  50 mg Oral Daily    sodium chloride flush  5-40 mL IntraVENous 2 times per day    atorvastatin  10 mg Oral Daily    lisinopril  10 mg Oral Daily    furosemide  40 mg Oral BID      sodium chloride      dextrose         Lab Data: Lab results independently reviewed and analyzed by myself 9/30/21   CBC:   Recent Labs     09/30/21  0136 09/30/21  0426 10/01/21  0557   WBC 5.8 5.7 4.3   HGB 11.0* 11.7* 10.5*    241 299     BMP:    Recent Labs     09/30/21  0138 10/01/21  0557   * 140   K 3.9 3.5   CO2 28 18*   BUN 18 20   CREATININE 1.0 1.0     INR:    Recent Labs     09/30/21 0138   INR 1.34*     BNP:    Recent Labs     09/30/21 0138   PROBNP 3,351*     Cardiac Enzymes:      Ref. Range 9/19/2021 09:47 9/19/2021 14:24 9/19/2021 17:47 9/26/2021 11:36 9/26/2021 17:39 9/30/2021 01:38   Troponin Latest Ref Range: <0.01 ng/mL <0.01 <0.01 <0.01 0.02 (H) 0.02 (H) 0.01        Ref. Range 9/24/2021 06:15   CRP Latest Ref Range: 0.0 - 5.1 mg/L 21.5 (H)       Lipids:   Lab Results   Component Value Date    TRIG 113 12/11/2015    TRIG 84 10/30/2014    HDL 57 12/11/2015    HDL 70 10/30/2014    LDLCALC 59 12/11/2015    LDLCALC 77 10/30/2014       Cardiac Imaging:    Cardiac Cath 9/30/2021:  Access: Right CFA (right radial artery very small)  Hemostasis: Instill close advice   Moderate Sedation:  Start time: 0303  Stop time: 0330  3 mg versed   100 mcg fentanyl   An independent trained observer pushed medications at my direction. We monitored the patient's level of consciousness and vital signs/physiologic status throughout the procedure duration (see start and start times above). Bleeding risk: Intermediate  LVEDP: 18 mmHg  AO: 170/72 mmHg  Estimated blood loss: Less than 25 mL  Contrast: 96 mL    Anatomy:   LM-normal  LAD-normal  Cx-normal  OM1- normal  RCA-normal  RPDA- normal  LVEF-45 to 50% with distal anterior, apical, inferoapical severe hypokinesis to akinesis   Impression:  1. Normal coronary arteries. 2.  Mild LV systolic dysfunction. 3.  Takotsubo cardiomyopathy? 4. Noncardiac chest pain. Plan:  1. Resume enteric-coated aspirin 81 mg daily. 2.  Coreg/Toprol-XL, ACE inhibitor/ARB. 3.  Diuresis. Echo 9/22/2021:  The left ventricular systolic function is moderately reduced with an ejection fraction of 30-35%.     There is hypokinesis of the apex, apical lateral, apical septum, anterior, anteroseptum and apical anterior walls. Grade I diastolic dysfunction with normal filling pressure. Changes noted from previous echo on 6- in left ventricular function. Mild mitral regurgitation. Echo 2018:  Normal left ventricle systolic function with an estimated ejection fraction of 55%. No regional wall motion abnormalities are seen. Grade I diastolic dysfunction with normal filing pressure. The non-coronary cusp of the aortic valve is thickened/calcified with decreased leaflet mobility. No aortic stenosis noted. Mild pulmonic regurgitation. Mild tricuspid regurgitation. Systolic pulmonary artery pressure (SPAP) is normal and estimated at 24 mmHg (right atrial pressure 3 mmHg). Coronary angiogram 10/2014:  LM, LAD, LCX, RCA with no significant CAD (RCA with <10%)  Significant kinking and tortousity of vessel  LVEDP 5  LVEF 65%  PLAN  1. No significant CAD (only <10% in prox RCA)  2. CP likley from HTn and stress, possibly from coronary kinking.

## 2021-10-01 NOTE — PROGRESS NOTES
Physical Therapy    Facility/Department: [facility] - MED SURG  Initial Assessment    NAME: Jeanette Adames  : 1950  MRN: [id number]    Date of Service: 10/1/2021    Discharge Recommendations:  Subacute/Skilled Nursing Facility   PT Equipment Recommendations  Equipment Needed: No  Other: defer  If pt is unable to be seen after this session, please let this note serve as discharge summary. Please see case management note for discharge disposition. Thank you. Assessment   Body structures, Functions, Activity limitations: Decreased functional mobility ; Decreased balance;Decreased ADL status; Decreased strength;Decreased safe awareness;Decreased cognition;Decreased endurance  Assessment: Pt admitted for a second time in a couple weeks for chest pain. Pt very lethargic and flat throughout evaluation and limited in mobility to a couple feet to the [facility] and then to the chair with c/o dizziness. Pt reports at baseline she ambulates wtih a RW and indep with ADLs but doesn't do any housework. answers appropriately to orientation questions but delayed processing observed with additional assist required for transfers as pt was unable to follow commands to push off from the chair or bed. Recommend SNF upon DC to improve safe mobility  Treatment Diagnosis: decreased mobility  Prognosis: Good  Decision Making: Medium Complexity  PT Education: Goals; General Safety;PT Role;Functional Mobility Training  Patient Education: Will need reinforcement on benefits of OOB activity  Barriers to Learning: cognitition  REQUIRES PT FOLLOW UP: Yes  Activity Tolerance  Activity Tolerance: Patient limited by fatigue;Patient limited by endurance; Patient limited by cognitive status  Activity Tolerance: 109/60, 100% SpO2, 108 HR       Patient Diagnosis(es): The encounter diagnosis was ACS (acute coronary syndrome) ([facility] [address]      has a past medical history of Arthritis, Asthma, Diabetes mellitus ([facility] [address] History of palpitations, Hyperlipidemia, and Hypertension. has a past surgical history that includes Cholecystectomy; Gastric bypass surgery (2005); Hysterectomy; hernia repair; cyst removal; Tonsillectomy; Colonoscopy; Endoscopy, colon, diagnostic; Coronary angioplasty (10/30/14); other surgical history (CYSTOSCOPY, RIGHT URETERAL STONE MANIPULATION WITH RIGHT); Total shoulder arthroplasty (Left, 12/15/2016); Cataract removal with implant (Right, 10/18/2017); and Cataract removal with implant (Left, 11/08/2017). Restrictions  Restrictions/Precautions  Restrictions/Precautions: Fall Risk  Position Activity Restriction  Other position/activity restrictions: pure wick  Vision/Hearing  Vision: Impaired  Vision Exceptions: Wears glasses for reading  Hearing: Within functional limits     Subjective  General  Chart Reviewed: Yes  Patient assessed for rehabilitation services?: Yes  Response To Previous Treatment: Not applicable  Family / Caregiver Present: No  Referring Practitioner: JENN Amaya CNP  Referral Date : 10/01/21  Diagnosis: chest pain  Follows Commands: Within Functional Limits  General Comment  Comments: cleared by nursing  Subjective  Subjective: pt resting in bed.   Pain Screening  Patient Currently in Pain: Denies  Vital Signs  Patient Currently in Pain: Denies       Orientation  Orientation  Overall Orientation Status: Within Functional Limits  Social/Functional History  Social/Functional History  Lives With: Family  Type of Home: House  Home Layout: Two level  Home Access: Stairs to enter without rails  Entrance Stairs - Number of Steps: 1  Bathroom Shower/Tub: Walk-in shower  Bathroom Toilet: Handicap height  Bathroom Equipment: Shower chair  Home Equipment: Rolling walker, Cane, [employer], [address] chair  ADL Assistance: Independent  Homemaking Responsibilities: No  Ambulation Assistance: Independent (with RW)  Active : No  Occupation: Retired  Cognition        Objective          PROM RLE (degrees)  RLE PROM: WFL  AROM RLE (degrees)  RLE AROM: WFL  PROM LLE (degrees)  LLE PROM: WFL  AROM LLE (degrees)  LLE AROM : WFL  Strength RLE  Comment: 3+/5  Strength LLE  Comment: 3+/5  Tone RLE  RLE Tone: Normotonic  Tone LLE  LLE Tone: Normotonic  Motor Control  Gross Motor?: WFL  Sensation  Overall Sensation Status: WFL  Bed mobility  Supine to Sit: Minimal assistance (elevated HOB)  Sit to Supine: Unable to assess  Transfers  Sit to Stand: Minimal Assistance  Stand to sit: Minimal Assistance  Ambulation  Ambulation?: Yes  More Ambulation?: No  Ambulation 1  Surface: level tile  Device: Rolling Walker  Assistance: Minimal assistance  Quality of Gait: flexed posture,  Gait Deviations: Slow Leona; Shuffles;Decreased step length;Decreased step height  Distance: 2' + 2'  Comments: reports dizziness limiting mobility     Balance  Posture: Fair  Sitting - Static: Fair;+  Sitting - Dynamic: Fair;+  Standing - Static: Fair  Standing - Dynamic: Fair;-  Exercises  Heelslides: x10 B  Ankle Pumps: x10 B     Plan   Plan  Times per week: 3-5x/week  Current Treatment Recommendations: Strengthening, Balance Training, Endurance Training, Functional Mobility Training, Transfer Training, Gait Training, Equipment Evaluation, Education, & procurement, Patient/Caregiver Education & Training, Safety Education & Training, Home Exercise Program  Safety Devices  Type of devices:  All fall risk precautions in place, Left in chair, Call light within reach, Chair alarm in place, Gait belt, Patient at risk for falls, Nurse notified  Restraints  Initially in place: No      AM-PAC Score  AM-PAC Inpatient Mobility Raw Score : 15 (10/01/21 1403)  AM-PAC Inpatient T-Scale Score : 39.45 (10/01/21 1403)  Mobility Inpatient CMS 0-100% Score: 57.7 (10/01/21 1403)  Mobility Inpatient CMS G-Code Modifier : CK (10/01/21 1403)          Goals  Short term goals  Time Frame for Short term goals: 10/5  Short term goal 1: Pt will complete bed mobility with supervision  Short term goal 2: Pt will ambulate x 25' with RW with CGA  Short term goal 3: Pt will complete B LE exercises to improve endurance and mobility by 10/3  Patient Goals   Patient goals : to go home       Therapy Time   Individual Concurrent Group Co-treatment   Time In 3666         Time Out 1548         Minutes 33         Timed Code Treatment Minutes: ZAID Campo

## 2021-10-02 LAB
AMPHETAMINE SCREEN, URINE: ABNORMAL
ANION GAP SERPL CALCULATED.3IONS-SCNC: 25 MMOL/L (ref 3–16)
BARBITURATE SCREEN URINE: ABNORMAL
BENZODIAZEPINE SCREEN, URINE: POSITIVE
BILIRUBIN URINE: ABNORMAL
BLOOD, URINE: NEGATIVE
BUN BLDV-MCNC: 24 MG/DL (ref 7–20)
CALCIUM SERPL-MCNC: 9.6 MG/DL (ref 8.3–10.6)
CANNABINOID SCREEN URINE: ABNORMAL
CHLORIDE BLD-SCNC: 94 MMOL/L (ref 99–110)
CLARITY: CLEAR
CO2: 19 MMOL/L (ref 21–32)
COCAINE METABOLITE SCREEN URINE: ABNORMAL
COLOR: YELLOW
CREAT SERPL-MCNC: 1 MG/DL (ref 0.6–1.2)
GFR AFRICAN AMERICAN: >60
GFR NON-AFRICAN AMERICAN: 54
GLUCOSE BLD-MCNC: 130 MG/DL (ref 70–99)
GLUCOSE BLD-MCNC: 141 MG/DL (ref 70–99)
GLUCOSE BLD-MCNC: 147 MG/DL (ref 70–99)
GLUCOSE BLD-MCNC: 153 MG/DL (ref 70–99)
GLUCOSE BLD-MCNC: 153 MG/DL (ref 70–99)
GLUCOSE URINE: 500 MG/DL
HCT VFR BLD CALC: 35.8 % (ref 36–48)
HEMOGLOBIN: 11.4 G/DL (ref 12–16)
KETONES, URINE: >=80 MG/DL
LACTIC ACID: 1.3 MMOL/L (ref 0.4–2)
LEUKOCYTE ESTERASE, URINE: NEGATIVE
Lab: ABNORMAL
MCH RBC QN AUTO: 28.8 PG (ref 26–34)
MCHC RBC AUTO-ENTMCNC: 32 G/DL (ref 31–36)
MCV RBC AUTO: 90.2 FL (ref 80–100)
METHADONE SCREEN, URINE: ABNORMAL
MICROSCOPIC EXAMINATION: ABNORMAL
NITRITE, URINE: NEGATIVE
OPIATE SCREEN URINE: ABNORMAL
OXYCODONE URINE: ABNORMAL
PDW BLD-RTO: 22.1 % (ref 12.4–15.4)
PERFORMED ON: ABNORMAL
PH UA: 6
PH UA: 6 (ref 5–8)
PHENCYCLIDINE SCREEN URINE: ABNORMAL
PLATELET # BLD: 318 K/UL (ref 135–450)
PLATELET SLIDE REVIEW: ADEQUATE
PMV BLD AUTO: 7.6 FL (ref 5–10.5)
POTASSIUM REFLEX MAGNESIUM: 3.7 MMOL/L (ref 3.5–5.1)
PROPOXYPHENE SCREEN: ABNORMAL
PROTEIN UA: NEGATIVE MG/DL
RBC # BLD: 3.96 M/UL (ref 4–5.2)
SLIDE REVIEW: ABNORMAL
SODIUM BLD-SCNC: 138 MMOL/L (ref 136–145)
SPECIFIC GRAVITY UA: 1.02 (ref 1–1.03)
URINE TYPE: ABNORMAL
UROBILINOGEN, URINE: 0.2 E.U./DL
WBC # BLD: 4.9 K/UL (ref 4–11)

## 2021-10-02 PROCEDURE — 83605 ASSAY OF LACTIC ACID: CPT

## 2021-10-02 PROCEDURE — 6370000000 HC RX 637 (ALT 250 FOR IP): Performed by: NURSE PRACTITIONER

## 2021-10-02 PROCEDURE — 6360000002 HC RX W HCPCS: Performed by: NURSE PRACTITIONER

## 2021-10-02 PROCEDURE — 80307 DRUG TEST PRSMV CHEM ANLYZR: CPT

## 2021-10-02 PROCEDURE — 6370000000 HC RX 637 (ALT 250 FOR IP): Performed by: INTERNAL MEDICINE

## 2021-10-02 PROCEDURE — 1200000000 HC SEMI PRIVATE

## 2021-10-02 PROCEDURE — 85027 COMPLETE CBC AUTOMATED: CPT

## 2021-10-02 PROCEDURE — 99223 1ST HOSP IP/OBS HIGH 75: CPT | Performed by: HOSPITALIST

## 2021-10-02 PROCEDURE — 2580000003 HC RX 258: Performed by: INTERNAL MEDICINE

## 2021-10-02 PROCEDURE — 80048 BASIC METABOLIC PNL TOTAL CA: CPT

## 2021-10-02 PROCEDURE — 6370000000 HC RX 637 (ALT 250 FOR IP): Performed by: HOSPITALIST

## 2021-10-02 PROCEDURE — 81003 URINALYSIS AUTO W/O SCOPE: CPT

## 2021-10-02 PROCEDURE — 36415 COLL VENOUS BLD VENIPUNCTURE: CPT

## 2021-10-02 RX ORDER — SODIUM BICARBONATE 650 MG/1
650 TABLET ORAL 2 TIMES DAILY
Status: DISCONTINUED | OUTPATIENT
Start: 2021-10-02 | End: 2021-10-05 | Stop reason: HOSPADM

## 2021-10-02 RX ADMIN — PROCHLORPERAZINE EDISYLATE 10 MG: 5 INJECTION INTRAMUSCULAR; INTRAVENOUS at 15:35

## 2021-10-02 RX ADMIN — FUROSEMIDE 40 MG: 40 TABLET ORAL at 09:01

## 2021-10-02 RX ADMIN — SODIUM BICARBONATE 650 MG TABLET 650 MG: at 20:51

## 2021-10-02 RX ADMIN — Medication 10 ML: at 09:02

## 2021-10-02 RX ADMIN — METOPROLOL SUCCINATE 50 MG: 50 TABLET, EXTENDED RELEASE ORAL at 09:01

## 2021-10-02 RX ADMIN — ACETAMINOPHEN 650 MG: 325 TABLET ORAL at 20:51

## 2021-10-02 RX ADMIN — Medication 10 ML: at 20:51

## 2021-10-02 RX ADMIN — ATORVASTATIN CALCIUM 10 MG: 10 TABLET, FILM COATED ORAL at 09:01

## 2021-10-02 RX ADMIN — ENOXAPARIN SODIUM 40 MG: 40 INJECTION SUBCUTANEOUS at 09:01

## 2021-10-02 RX ADMIN — MAGNESIUM GLUCONATE 500 MG ORAL TABLET 400 MG: 500 TABLET ORAL at 09:01

## 2021-10-02 RX ADMIN — ASPIRIN 81 MG: 81 TABLET, CHEWABLE ORAL at 09:01

## 2021-10-02 RX ADMIN — MAGNESIUM GLUCONATE 500 MG ORAL TABLET 400 MG: 500 TABLET ORAL at 20:51

## 2021-10-02 RX ADMIN — LISINOPRIL 10 MG: 10 TABLET ORAL at 09:01

## 2021-10-02 ASSESSMENT — PAIN SCALES - WONG BAKER: WONGBAKER_NUMERICALRESPONSE: 4

## 2021-10-02 ASSESSMENT — PAIN SCALES - GENERAL
PAINLEVEL_OUTOF10: 0
PAINLEVEL_OUTOF10: 3
PAINLEVEL_OUTOF10: 0
PAINLEVEL_OUTOF10: 0

## 2021-10-02 ASSESSMENT — PAIN DESCRIPTION - LOCATION: LOCATION: GENERALIZED

## 2021-10-02 NOTE — CONSULTS
Office: [phone]       Fax: [phone]      Nephrology Initial Consult Note        Patient's Name: Shivam Escobar Date: 9/30/2021  Date of Visit: 10/2/2021    Reason for Consult:  Met acidosis  Requesting Physician:  Natalie Nino MD  PCP: Krish Gregorio    Chief Complaint:  chest pain      History of Present Illness:      Jeanette Adames is a 66 y.o. female with PMHx of hypertension, DM who was hospitalized on 9/30/2021 with complaints of chest pain   Had shortness of breath   Pt is being treated for CHF  Has met acidosis  Reports diarrhea recently   No .nausea or vomiting   Recently dc from hospital  Home meds reviewed    Past Medical History:   Diagnosis Date    Arthritis     Asthma     Diabetes mellitus ([address]     type 2, takes PO meds    History of palpitations     Hyperlipidemia     Hypertension        Past Surgical History:   Procedure Laterality Date    CATARACT REMOVAL WITH IMPLANT Right 10/18/2017    entered to epic from h&P    CATARACT REMOVAL WITH IMPLANT Left 11/08/2017    CHOLECYSTECTOMY      COLONOSCOPY      CORONARY ANGIOPLASTY  10/30/14    CYST REMOVAL      from right wrist     ENDOSCOPY, COLON, DIAGNOSTIC      GASTRIC BYPASS SURGERY  2005    HERNIA REPAIR      HYSTERECTOMY      age 30    OTHER SURGICAL HISTORY  CYSTOSCOPY, RIGHT URETERAL STONE MANIPULATION WITH RIGHT    SHOULDER ARTHROPLASTY Left 12/15/2016    LEFT PROXIMAL HUMERUS OPEN REDUCTION INTERNAL FIXATION     TONSILLECTOMY         Family History   Problem Relation Age of Onset    Heart Attack Maternal Grandmother         reports that she has never smoked. She has never used smokeless tobacco. She reports that she does not drink alcohol and does not use drugs. Medications:    Allergies:  Morphine, Sulfamethoxazole, Trimethoprim, Alendronate, Bactrim [sulfamethoxazole-trimethoprim], Buspirone, Calcitonin (salmon), Demerol hcl [meperidine], Dust mite extract, Esomeprazole magnesium, Glipizide, Metformin, Mometasone, Omeprazole, Other, Oxycodone, Oxycodone-acetaminophen, Pcn [penicillins], Percocet [oxycodone-acetaminophen], Prednisone, Propoxyphene, Ranitidine, Sulfa antibiotics, Toradol [ketorolac tromethamine], Tramadol, and Zantac [ranitidine hcl]    Scheduled Meds:   furosemide  40 mg Oral Daily    enoxaparin  40 mg SubCUTAneous Daily    magnesium oxide  400 mg Oral BID    aspirin  81 mg Oral Daily    metoprolol succinate  50 mg Oral Daily    sodium chloride flush  5-40 mL IntraVENous 2 times per day    atorvastatin  10 mg Oral Daily    lisinopril  10 mg Oral Daily     Continuous Infusions:   sodium chloride      dextrose         Labs:  CBC:   Recent Labs     09/30/21  0426 10/01/21  0557 10/02/21  0658   WBC 5.7 4.3 4.9   HGB 11.7* 10.5* 11.4*    299 318     Ca/Mg/Phos:   Recent Labs     09/30/21  0138 09/30/21  0138 10/01/21  0557 10/01/21  1954 10/02/21  0658   CALCIUM 9.8   < > 9.2 9.6 9.6   MG 1.80  --   --   --   --     < > = values in this interval not displayed. UA:  Recent Labs     10/02/21  0204   COLORU Yellow   CLARITYU Clear   GLUCOSEU 500*   BILIRUBINUR MODERATE*   KETUA >=80*   SPECGRAV 1.020   BLOODU Negative   PHUR 6.0  6.0   PROTEINU Negative   UROBILINOGEN 0.2   NITRU Negative   LEUKOCYTESUR Negative   LABMICR Not Indicated   URINETYPE NotGiven      Urine Microscopic: No results for input(s): LABCAST, BACTERIA, COMU, HYALCAST, WBCUA, RBCUA, EPIU in the last 72 hours. Urine Chemistry: No results for input(s): Kendy Nicely, PROTEINUR, NAUR in the last 72 hours.       ROS:     All systems reviewed and negative except as in HPI    Objective:     Vitals: /63   Pulse 70   Temp 97.6 °F (36.4 °C) (Oral)   Resp 17   Ht 5' 3\" (1.6 m)   Wt 143 lb 11.2 oz (65.2 kg)   SpO2 97%   BMI 25.46 kg/m²    Wt Readings from Last 3 Encounters:   10/02/21 143 lb 11.2 oz (65.2 kg)   09/28/21 157 lb (71.2 kg)   06/17/19 120 lb 8 oz (54.7 kg)      24HR INTAKE/OUTPUT:      Intake/Output Summary (Last 24 hours) at 10/2/2021 1540  Last data filed at 10/2/2021 1451  Gross per 24 hour   Intake 900 ml   Output 1352 ml   Net -452 ml     Constitutional:  awake, NAD  HEENT:  MMM, No icterus  Neck: no bruits, No JVD  Cardiovascular:  reg rhythm  Respiratory: CTA, no crackles  Abdomen:  +BS, soft, NT, ND  Ext: trace lower extremity edema  Psychiatric: mood and affect appropriate  Skin: dry/intact  CNS: alert, no agitation    IMAGING:  CT HEAD WO CONTRAST   Final Result   No acute intracranial abnormality. Mild cerebral atrophy and mild-to-moderate chronic ischemic changes both   appropriate for age. XR CHEST PORTABLE   Final Result   Pneumonia in the left lower lung are still present. There is improvement in   the right base. Assessment :     1. AGMA  -BC <1  -no history of short bowel  -has resp alk    Recent Labs     09/30/21  0138 10/01/21  0557 10/01/21  1954 10/02/21  0658   BUN 18 20 21* 24*   CREATININE 1.0 1.0 1.1 1.0     Recent Labs     09/30/21  0138 10/01/21  0557 10/01/21  1954 10/02/21  0658   * 140 139 138   K 3.9 3.5 3.6 3.7   CO2 28 18* 19* 19*   MG 1.80  --   --   --          2. HTN  -Blood pressure low normal    BP Readings from Last 1 Encounters:   10/02/21 106/63       3. Acute on chronic systolic congestive heart failure  ejection fraction of 30-35%. status post [facility] Normal coronary arteries. Plan:     - check lactate, (D and L)  - sod bicarb  - minimize saline  - lasix per card  - Monitor BMP    -Monitor I/O, UOP  -Maintain MAP>65  -Avoid nephrotoxin, if able. -Dose meds to current eGFR    Thank you for allowing us to participate in care of Violette Banks . We will continue to follow. Feel free to contact me with any questions.       Milly Don MD  10/2/2021    [facility] [address]  Office : [phone]  Fax :[phone]

## 2021-10-02 NOTE — CONSULTS
Consult received  Full note to follow    Cirilo Atkins MD  10/2/2021    [facility] [address]  Office : [phone]  Fax :[phone]

## 2021-10-02 NOTE — PLAN OF CARE
Problem: Falls - Risk of:  Goal: Will remain free from falls  Description: Will remain free from falls  Outcome: Ongoing     Problem: Skin Integrity:  Goal: Will show no infection signs and symptoms  Description: Will show no infection signs and symptoms  Outcome: Ongoing     Problem: Pain:  Goal: Pain level will decrease  Description: Pain level will decrease  Outcome: Ongoing     Problem: Pain:  Goal: Control of acute pain  Description: Control of acute pain  Outcome: Ongoing     Problem: Pain:  Goal: Control of chronic pain  Description: Control of chronic pain  Outcome: Ongoing     Problem: Serum Glucose Level - Abnormal:  Goal: Ability to maintain appropriate glucose levels will improve  Description: Ability to maintain appropriate glucose levels will improve  Outcome: Ongoing     Pt educated on the importance of a carb control diet. Monitoring pt's blood glucose AC/HS. Sliding scale insulin given as ordered according to pt's blood glucose. Will continue to monitor.

## 2021-10-02 NOTE — CONSULTS
Consult placed    Who:Dr. Raven Hallman  Date:10/2/2021,  Time:4:41 PM        Electronically signed by Urbano Loja on 10/2/2021 at 4:41 PM

## 2021-10-02 NOTE — PROGRESS NOTES
prochlorperazine, albuterol sulfate HFA, sodium chloride flush, sodium chloride, acetaminophen, glucose, dextrose, glucagon (rDNA), dextrose      Intake/Output Summary (Last 24 hours) at 10/2/2021 1338  Last data filed at 10/2/2021 1256  Gross per 24 hour   Intake 780 ml   Output 1352 ml   Net -572 ml       Physical Exam Performed:    /63   Pulse 70   Temp 97.6 °F (36.4 °C) (Oral)   Resp 17   Ht 5' 3\" (1.6 m)   Wt 143 lb 11.2 oz (65.2 kg)   SpO2 97%   BMI 25.46 kg/m²     General appearance: No apparent distress, appears stated age and cooperative. HEENT: Pupils equal, round, and reactive to light. Conjunctivae/corneas clear. Neck: Supple, with full range of motion. No jugular venous distention. Trachea midline. Respiratory:  Normal respiratory effort. Clear to auscultation, bilaterally without Rales/Wheezes/Rhonchi. Cardiovascular: Regular rate and rhythm with normal S1/S2 without murmurs, rubs or gallops. Abdomen: Soft, non-tender, non-distended with normal bowel sounds. Musculoskeletal: No clubbing, cyanosis or edema bilaterally. Full range of motion without deformity. Skin: Skin color, texture, turgor normal.  No rashes or lesions. Neurologic:  Neurovascularly intact without any focal sensory/motor deficits. Cranial nerves: II-XII intact, grossly non-focal.  Psychiatric: Alert and oriented, thought content appropriate, normal insight  Capillary Refill: Brisk,3 seconds, normal   Peripheral Pulses: +2 palpable, equal bilaterally       Labs:   Recent Labs     09/30/21  0426 10/01/21  0557 10/02/21  0658   WBC 5.7 4.3 4.9   HGB 11.7* 10.5* 11.4*   HCT 36.3 32.0* 35.8*    299 318     Recent Labs     10/01/21  0557 10/01/21  1954 10/02/21  0658    139 138   K 3.5 3.6 3.7   CL 96* 94* 94*   CO2 18* 19* 19*   BUN 20 21* 24*   CREATININE 1.0 1.1 1.0   CALCIUM 9.2 9.6 9.6     No results for input(s): AST, ALT, BILIDIR, BILITOT, ALKPHOS in the last 72 hours.   Recent Labs     09/30/21  0132 INR 1.34*     Recent Labs     09/30/21  0138   TROPONINI 0.01       Urinalysis:      Lab Results   Component Value Date    NITRU Negative 10/02/2021    WBCUA 0-2 09/19/2021    RBCUA 0-2 09/19/2021    BLOODU Negative 10/02/2021    SPECGRAV 1.020 10/02/2021    GLUCOSEU 500 10/02/2021       Radiology:  CT HEAD WO CONTRAST   Final Result   No acute intracranial abnormality. Mild cerebral atrophy and mild-to-moderate chronic ischemic changes both   appropriate for age. XR CHEST PORTABLE   Final Result   Pneumonia in the left lower lung are still present. There is improvement in   the right base. Assessment/Plan:    Active Hospital Problems    Diagnosis     Acute on chronic systolic congestive heart failure (HCC) [I50.23]     Shortness of breath [R06.02]     Nonischemic cardiomyopathy (HCC) [I42.8]     Abnormal ECG [R94.31]     Chest pain [R07.9]     Essential hypertension [I10]      Acute encephalopathy:  Etiology unclear at this time  CT head:No acute intracranial abnormality. Mild cerebral atrophy and mild-to-moderate chronic ischemic changes both appropriate for age  labs, urinalysis UDS,  Patient was given multiple Ativan for agitation  Patient continued kept getting out of bed and putting herself in danger despite multiple attempts to orientation, sitter placed. I also did speak with family and updated on the status. Unable to give antipsychotics due to QTC being significantly prolonged    AG Metabolic Acidosis  -nephrology consult  -AG 25, no n/v/d    Chest pain s/p catherization:   interventional cardiology, much appreciated  Patient was found to have normal coronary arteries with mild LV systolic dysfunction  -BB, lisionpril, lasix, asa, statin     HFrEF last echo on 09/22/2020: not in exacerbation.   Patient with significant improvement in EF after catheterization suspect Takotsubo?     Normocytic anemia: No signs or symptoms of bleeding     Hypomagnesemia: Replaced for a goal above 2     DVT Prophylaxis: lovenox  Diet: ADULT DIET; Regular; 3 carb choices (45 gm/meal);  Low Fat/Low Chol/High Fiber/MICHEL  Code Status: Full Code    PT/OT Eval Status: consulted    Dispo - pending course, SNF when arranged    JENN Vargas - CNP

## 2021-10-03 LAB
A/G RATIO: 0.9 (ref 1.1–2.2)
ALBUMIN SERPL-MCNC: 3.4 G/DL (ref 3.4–5)
ALP BLD-CCNC: 104 U/L (ref 40–129)
ALT SERPL-CCNC: 14 U/L (ref 10–40)
ANION GAP SERPL CALCULATED.3IONS-SCNC: 20 MMOL/L (ref 3–16)
AST SERPL-CCNC: 21 U/L (ref 15–37)
BILIRUB SERPL-MCNC: 0.6 MG/DL (ref 0–1)
BUN BLDV-MCNC: 26 MG/DL (ref 7–20)
CALCIUM SERPL-MCNC: 9.8 MG/DL (ref 8.3–10.6)
CHLORIDE BLD-SCNC: 97 MMOL/L (ref 99–110)
CO2: 21 MMOL/L (ref 21–32)
CREAT SERPL-MCNC: 1.1 MG/DL (ref 0.6–1.2)
GFR AFRICAN AMERICAN: 59
GFR NON-AFRICAN AMERICAN: 49
GLOBULIN: 3.9 G/DL
GLUCOSE BLD-MCNC: 140 MG/DL (ref 70–99)
GLUCOSE BLD-MCNC: 151 MG/DL (ref 70–99)
GLUCOSE BLD-MCNC: 152 MG/DL (ref 70–99)
GLUCOSE BLD-MCNC: 158 MG/DL (ref 70–99)
HCT VFR BLD CALC: 35.3 % (ref 36–48)
HEMOGLOBIN: 11.4 G/DL (ref 12–16)
MCH RBC QN AUTO: 28.9 PG (ref 26–34)
MCHC RBC AUTO-ENTMCNC: 32.3 G/DL (ref 31–36)
MCV RBC AUTO: 89.5 FL (ref 80–100)
PDW BLD-RTO: 22.1 % (ref 12.4–15.4)
PERFORMED ON: ABNORMAL
PLATELET # BLD: 320 K/UL (ref 135–450)
PLATELET SLIDE REVIEW: ADEQUATE
PMV BLD AUTO: 7.7 FL (ref 5–10.5)
POTASSIUM REFLEX MAGNESIUM: 3.6 MMOL/L (ref 3.5–5.1)
RBC # BLD: 3.94 M/UL (ref 4–5.2)
SLIDE REVIEW: ABNORMAL
SODIUM BLD-SCNC: 138 MMOL/L (ref 136–145)
TOTAL PROTEIN: 7.3 G/DL (ref 6.4–8.2)
WBC # BLD: 4.5 K/UL (ref 4–11)

## 2021-10-03 PROCEDURE — 85027 COMPLETE CBC AUTOMATED: CPT

## 2021-10-03 PROCEDURE — 1200000000 HC SEMI PRIVATE

## 2021-10-03 PROCEDURE — 99232 SBSQ HOSP IP/OBS MODERATE 35: CPT | Performed by: HOSPITALIST

## 2021-10-03 PROCEDURE — 6370000000 HC RX 637 (ALT 250 FOR IP): Performed by: INTERNAL MEDICINE

## 2021-10-03 PROCEDURE — 36415 COLL VENOUS BLD VENIPUNCTURE: CPT

## 2021-10-03 PROCEDURE — 6370000000 HC RX 637 (ALT 250 FOR IP): Performed by: NURSE PRACTITIONER

## 2021-10-03 PROCEDURE — 2580000003 HC RX 258: Performed by: INTERNAL MEDICINE

## 2021-10-03 PROCEDURE — 6370000000 HC RX 637 (ALT 250 FOR IP): Performed by: HOSPITALIST

## 2021-10-03 PROCEDURE — 6360000002 HC RX W HCPCS: Performed by: NURSE PRACTITIONER

## 2021-10-03 PROCEDURE — 80053 COMPREHEN METABOLIC PANEL: CPT

## 2021-10-03 RX ADMIN — ASPIRIN 81 MG: 81 TABLET, CHEWABLE ORAL at 09:10

## 2021-10-03 RX ADMIN — PROCHLORPERAZINE EDISYLATE 10 MG: 5 INJECTION INTRAMUSCULAR; INTRAVENOUS at 15:06

## 2021-10-03 RX ADMIN — Medication 10 ML: at 15:06

## 2021-10-03 RX ADMIN — MAGNESIUM GLUCONATE 500 MG ORAL TABLET 400 MG: 500 TABLET ORAL at 20:51

## 2021-10-03 RX ADMIN — SODIUM BICARBONATE 650 MG TABLET 650 MG: at 20:51

## 2021-10-03 RX ADMIN — FUROSEMIDE 40 MG: 40 TABLET ORAL at 09:10

## 2021-10-03 RX ADMIN — PROCHLORPERAZINE EDISYLATE 10 MG: 5 INJECTION INTRAMUSCULAR; INTRAVENOUS at 20:54

## 2021-10-03 RX ADMIN — Medication 10 ML: at 09:11

## 2021-10-03 RX ADMIN — SODIUM BICARBONATE 650 MG TABLET 650 MG: at 09:10

## 2021-10-03 RX ADMIN — Medication 10 ML: at 20:52

## 2021-10-03 RX ADMIN — ENOXAPARIN SODIUM 40 MG: 40 INJECTION SUBCUTANEOUS at 09:10

## 2021-10-03 RX ADMIN — METOPROLOL SUCCINATE 50 MG: 50 TABLET, EXTENDED RELEASE ORAL at 09:10

## 2021-10-03 RX ADMIN — ATORVASTATIN CALCIUM 10 MG: 10 TABLET, FILM COATED ORAL at 09:10

## 2021-10-03 RX ADMIN — MAGNESIUM GLUCONATE 500 MG ORAL TABLET 400 MG: 500 TABLET ORAL at 09:10

## 2021-10-03 ASSESSMENT — PAIN SCALES - GENERAL
PAINLEVEL_OUTOF10: 0

## 2021-10-03 NOTE — PROGRESS NOTES
Office: [phone]       Fax: [phone]      Nephrology Progress Note        Patient's Name: Cadence Magallon Date: 9/30/2021  Date of Visit: 10/3/2021    Reason for Consult:  Met acidosis  Requesting Physician:  Ej Pinzon MD  PCP: Russ De Leon    Chief Complaint:  chest pain      History of Present Illness:      Raoul Heredia is a 66 y.o. female with PMHx of hypertension, DM who was hospitalized on 9/30/2021 with complaints of chest pain   Had shortness of breath   Pt is being treated for CHF  Has met acidosis  Reports diarrhea recently   No .nausea or vomiting   Recently dc from hospital  Home meds reviewed    [address] better  Shortness of breath: No   UOP: Good   Creat: stable       Past Medical History:   Diagnosis Date    Arthritis     Asthma     Diabetes mellitus ([address]     type 2, takes PO meds    History of palpitations     Hyperlipidemia     Hypertension        Past Surgical History:   Procedure Laterality Date    CATARACT REMOVAL WITH IMPLANT Right 10/18/2017    entered to epic from h&P    CATARACT REMOVAL WITH IMPLANT Left 11/08/2017    CHOLECYSTECTOMY      COLONOSCOPY      CORONARY ANGIOPLASTY  10/30/14    CYST REMOVAL      from right wrist     ENDOSCOPY, COLON, DIAGNOSTIC      GASTRIC BYPASS SURGERY  2005    HERNIA REPAIR      HYSTERECTOMY      age 30    OTHER SURGICAL HISTORY  CYSTOSCOPY, RIGHT URETERAL STONE MANIPULATION WITH RIGHT    SHOULDER ARTHROPLASTY Left 12/15/2016    LEFT PROXIMAL HUMERUS OPEN REDUCTION INTERNAL FIXATION     TONSILLECTOMY         Family History   Problem Relation Age of Onset    Heart Attack Maternal Grandmother         reports that she has never smoked.  She has never used smokeless tobacco. She reports that she does not drink alcohol and does not use drugs.     Medications: Allergies:  Morphine, Sulfamethoxazole, Trimethoprim, Alendronate, Bactrim [sulfamethoxazole-trimethoprim], Buspirone, Calcitonin (salmon), Demerol hcl [meperidine], Dust mite extract, Esomeprazole magnesium, Glipizide, Metformin, Mometasone, Omeprazole, Other, Oxycodone, Oxycodone-acetaminophen, Pcn [penicillins], Percocet [oxycodone-acetaminophen], Prednisone, Propoxyphene, Ranitidine, Sulfa antibiotics, Toradol [ketorolac tromethamine], Tramadol, and Zantac [ranitidine hcl]    Scheduled Meds:   sodium bicarbonate  650 mg Oral BID    influenza virus vaccine  0.5 mL IntraMUSCular Prior to discharge    furosemide  40 mg Oral Daily    enoxaparin  40 mg SubCUTAneous Daily    magnesium oxide  400 mg Oral BID    aspirin  81 mg Oral Daily    metoprolol succinate  50 mg Oral Daily    sodium chloride flush  5-40 mL IntraVENous 2 times per day    atorvastatin  10 mg Oral Daily    lisinopril  10 mg Oral Daily     Continuous Infusions:   sodium chloride      dextrose         Labs:  CBC:   Recent Labs     10/01/21  0557 10/02/21  0658 10/03/21  0618   WBC 4.3 4.9 4.5   HGB 10.5* 11.4* 11.4*    318 320     Ca/Mg/Phos:   Recent Labs     10/01/21  1954 10/02/21  0658 10/03/21  0618   CALCIUM 9.6 9.6 9.8     UA:  Recent Labs     10/02/21  0204   COLORU Yellow   CLARITYU Clear   GLUCOSEU 500*   BILIRUBINUR MODERATE*   KETUA >=80*   SPECGRAV 1.020   BLOODU Negative   PHUR 6.0  6.0   PROTEINU Negative   UROBILINOGEN 0.2   NITRU Negative   LEUKOCYTESUR Negative   LABMICR Not Indicated   URINETYPE NotGiven      Urine Microscopic: No results for input(s): LABCAST, BACTERIA, COMU, HYALCAST, WBCUA, RBCUA, EPIU in the last 72 hours. Urine Chemistry: No results for input(s): Sharene Amanda, PROTEINUR, NAUR in the last 72 hours.         Objective:     Vitals: BP (!) 114/53   Pulse 65   Temp 97.8 °F (36.6 °C) (Oral)   Resp 18   Ht 5' 3\" (1.6 m)   Wt 142 lb 4.8 oz (64.5 kg)   SpO2 100%   BMI 25.21 kg/m²    Wt Readings from Last 3 Encounters:   10/03/21 142 lb 4.8 oz (64.5 kg)   09/28/21 157 lb (71.2 kg)   06/17/19 120 lb 8 oz (54.7 kg)      24HR INTAKE/OUTPUT:      Intake/Output Summary (Last 24 hours) at 10/3/2021 0940  Last data filed at 10/3/2021 0913  Gross per 24 hour   Intake 260 ml   Output 1002 ml   Net -742 ml     Constitutional:  awake, NAD  HEENT:  MMM, No icterus  Neck: no bruits, No JVD  Cardiovascular:  reg rhythm  Respiratory: CTA, no crackles  Abdomen:  +BS, soft, NT, ND  Ext: trace lower extremity edema    CNS: alert, no agitation    IMAGING:  CT HEAD WO CONTRAST   Final Result   No acute intracranial abnormality. Mild cerebral atrophy and mild-to-moderate chronic ischemic changes both   appropriate for age. XR CHEST PORTABLE   Final Result   Pneumonia in the left lower lung are still present. There is improvement in   the right base. Assessment :     1. AGMA  -BC <1  -no history of short bowel  -has resp alk    Recent Labs     10/01/21  0557 10/01/21  1954 10/02/21  0658 10/03/21  0618   BUN 20 21* 24* 26*   CREATININE 1.0 1.1 1.0 1.1     Recent Labs     10/01/21  0557 10/01/21  1954 10/02/21  0658 10/03/21  0618    139 138 138   K 3.5 3.6 3.7 3.6   CO2 18* 19* 19* 21         2. HTN  -Blood pressure low normal    BP Readings from Last 1 Encounters:   10/03/21 (!) 114/53       3. Acute on chronic systolic congestive heart failure  ejection fraction of 30-35%. status post [facility] Normal coronary arteries. Plan:     - check D lactate,   - continue sod bicarb  - minimize saline  - lasix per card  - Monitor BMP    -Monitor I/O, UOP  -Maintain MAP>65  -Avoid nephrotoxin, if able. -Dose meds to current eGFR    Thank you for allowing us to participate in care of Evon Deal . We will continue to follow. Feel free to contact me with any questions.       Aida Wesley MD  10/3/2021    [facility] [address]  Office : [phone]  Fax :[phone]

## 2021-10-03 NOTE — PLAN OF CARE
Fall precautions in place, bed alarm on, nonskid foot wear applied, bed in lowest position, and call light within reach. Avasys in place for patient safety.

## 2021-10-03 NOTE — CARE COORDINATION
CM spoke with patient daughter cornelio who would like a referral to [facility]. referral made via epic and message left.

## 2021-10-03 NOTE — PROGRESS NOTES
Hospitalist Progress Note      PCP: Santos Salazar    Date of Admission: 9/30/2021    Chief Complaint: cp/sob    Hospital Course: 66 y.o. female who presented to [facility] with PMH of asthma, type 2 diabetes, hypertension, hyperlipidemia presented to the ED for diagnosis of chest pain and shortness of breath.     Patient is encephalopathic on my examination thus history is limited     Patient was discharged from the hospital 3 days ago respiratory failure and pneumonia. Patient came to the ED for worsening chest pain reported substernal 2 hours before onset otherwise no dyspnea/on no exacerbating factor no alleviating factor nonradiating no association of nausea vomiting. Patient was noted to have new anterior lateral leads for ACS and received aspirin Brilinta heparin and went to Cath Lab. After Cath Lab patient was noted to have normal coronary arteries with mild left ventricular systolic dysfunction with suspicion of Takotsubo cardiomyopathy? .  Patient was continued on enteric aspirin 81 daily and beta-blocker.     I was paged to come to the bedside as patient is encephalopathic reported patient has been encephalopathic. Patient came to try to get out of bed being confused and reporting that she wants to leave [address] I spoke with daughter who is the POA and reported that she is agreeable to pursue medication to treat the patient. Patient has been refusing medications.     Subjective: patient states she is weak, but nad,  asking about dc to [facility]      Medications:  Reviewed    Infusion Medications    sodium chloride      dextrose       Scheduled Medications    sodium bicarbonate  650 mg Oral BID    influenza virus vaccine  0.5 mL IntraMUSCular Prior to discharge    furosemide  40 mg Oral Daily    enoxaparin  40 mg SubCUTAneous Daily    magnesium oxide  400 mg Oral BID    aspirin  81 mg Oral Daily    metoprolol succinate  50 mg Oral Daily    sodium chloride flush  5-40 mL IntraVENous 2 times per day    atorvastatin  10 mg Oral Daily    lisinopril  10 mg Oral Daily     PRN Meds: prochlorperazine, albuterol sulfate HFA, sodium chloride flush, sodium chloride, acetaminophen, glucose, dextrose, glucagon (rDNA), dextrose      Intake/Output Summary (Last 24 hours) at 10/3/2021 0917  Last data filed at 10/3/2021 0913  Gross per 24 hour   Intake 320 ml   Output 1002 ml   Net -682 ml       Physical Exam Performed:    BP (!) 114/53   Pulse 65   Temp 97.8 °F (36.6 °C) (Oral)   Resp 18   Ht 5' 3\" (1.6 m)   Wt 142 lb 4.8 oz (64.5 kg)   SpO2 100%   BMI 25.21 kg/m²     General appearance: No apparent distress, appears stated age and cooperative. HEENT: Pupils equal, round, and reactive to light. Conjunctivae/corneas clear. Neck: Supple, with full range of motion. No jugular venous distention. Trachea midline. Respiratory:  Normal respiratory effort. Clear to auscultation, bilaterally without Rales/Wheezes/Rhonchi. Cardiovascular: Regular rate and rhythm with normal S1/S2 without murmurs, rubs or gallops. Abdomen: Soft, non-tender, non-distended with normal bowel sounds. Musculoskeletal: No clubbing, cyanosis or edema bilaterally. Full range of motion without deformity. Skin: Skin color, texture, turgor normal.  No rashes or lesions. Neurologic:  Neurovascularly intact without any focal sensory/motor deficits.  Cranial nerves: II-XII intact, grossly non-focal.  Psychiatric: Alert and oriented, thought content appropriate, normal insight  Capillary Refill: Brisk,3 seconds, normal   Peripheral Pulses: +2 palpable, equal bilaterally       Labs:   Recent Labs     10/01/21  0557 10/02/21  0658 10/03/21  0618   WBC 4.3 4.9 4.5   HGB 10.5* 11.4* 11.4*   HCT 32.0* 35.8* 35.3*    318 320     Recent Labs     10/01/21  1954 10/02/21  0658 10/03/21  0618    138 138   K 3.6 3.7 3.6   CL 94* 94* 97*   CO2 19* 19* 21   BUN 21* 24* 26*   CREATININE 1.1 1.0 1.1   CALCIUM 9.6 9.6 9.8 Recent Labs     10/03/21  0618   AST 21   ALT 14   BILITOT 0.6   ALKPHOS 104     No results for input(s): INR in the last 72 hours. No results for input(s): Rhae Childes in the last 72 hours. Urinalysis:      Lab Results   Component Value Date    NITRU Negative 10/02/2021    WBCUA 0-2 09/19/2021    RBCUA 0-2 09/19/2021    BLOODU Negative 10/02/2021    SPECGRAV 1.020 10/02/2021    GLUCOSEU 500 10/02/2021       Radiology:  CT HEAD WO CONTRAST   Final Result   No acute intracranial abnormality. Mild cerebral atrophy and mild-to-moderate chronic ischemic changes both   appropriate for age. XR CHEST PORTABLE   Final Result   Pneumonia in the left lower lung are still present. There is improvement in   the right base. Assessment/Plan:    Active Hospital Problems    Diagnosis     Acute on chronic systolic congestive heart failure (HCC) [I50.23]     Shortness of breath [R06.02]     Nonischemic cardiomyopathy (HCC) [I42.8]     Abnormal ECG [R94.31]     Chest pain [R07.9]     Essential hypertension [I10]      Acute encephalopathy:  Etiology unclear at this time  CT head:No acute intracranial abnormality. Mild cerebral atrophy and mild-to-moderate chronic ischemic changes both appropriate for age  labs, urinalysis UDS,  Patient was given multiple Ativan for agitation  Patient continued kept getting out of bed and putting herself in danger despite multiple attempts to orientation, sitter placed. I also did speak with family and updated on the status. Unable to give antipsychotics due to QTC being significantly prolonged    AG Metabolic Acidosis  -nephrology consult  -AG 25->20, no n/v/d  -sodium bicarb tabs    Chest pain s/p catherization:   interventional cardiology, much appreciated  Patient was found to have normal coronary arteries with mild LV systolic dysfunction  -BB, [id number], lasix, asa, statin     HFrEF last echo on 09/22/2020: not in exacerbation.   Patient with significant improvement in EF after catheterization suspect Takotsubo?     Normocytic anemia: No signs or symptoms of bleeding     Hypomagnesemia: Replaced for a goal above 2     DVT Prophylaxis: lovenox  Diet: ADULT DIET; Regular; 3 carb choices (45 gm/meal);  Low Fat/Low Chol/High Fiber/MICHEL  Code Status: Full Code    PT/OT Eval Status: consulted    Dispo - pending course, anticipate dc tomorrow to SNF when arranged    JENN Bradford - CNP

## 2021-10-04 VITALS
HEIGHT: 63 IN | OXYGEN SATURATION: 99 % | RESPIRATION RATE: 18 BRPM | DIASTOLIC BLOOD PRESSURE: 59 MMHG | SYSTOLIC BLOOD PRESSURE: 115 MMHG | HEART RATE: 71 BPM | WEIGHT: 141.1 LBS | BODY MASS INDEX: 25 KG/M2 | TEMPERATURE: 97.5 F

## 2021-10-04 PROBLEM — E87.20 METABOLIC ACIDOSIS: Status: ACTIVE | Noted: 2021-10-04

## 2021-10-04 LAB
ANION GAP SERPL CALCULATED.3IONS-SCNC: 17 MMOL/L (ref 3–16)
BUN BLDV-MCNC: 29 MG/DL (ref 7–20)
CALCIUM SERPL-MCNC: 9.8 MG/DL (ref 8.3–10.6)
CHLORIDE BLD-SCNC: 96 MMOL/L (ref 99–110)
CO2: 24 MMOL/L (ref 21–32)
CREAT SERPL-MCNC: 1.1 MG/DL (ref 0.6–1.2)
GFR AFRICAN AMERICAN: 59
GFR NON-AFRICAN AMERICAN: 49
GLUCOSE BLD-MCNC: 160 MG/DL (ref 70–99)
HCT VFR BLD CALC: 35.8 % (ref 36–48)
HEMOGLOBIN: 12 G/DL (ref 12–16)
MCH RBC QN AUTO: 29.4 PG (ref 26–34)
MCHC RBC AUTO-ENTMCNC: 33.4 G/DL (ref 31–36)
MCV RBC AUTO: 88.1 FL (ref 80–100)
PDW BLD-RTO: 21.9 % (ref 12.4–15.4)
PLATELET # BLD: 338 K/UL (ref 135–450)
PMV BLD AUTO: 7.8 FL (ref 5–10.5)
POTASSIUM REFLEX MAGNESIUM: 3.6 MMOL/L (ref 3.5–5.1)
RBC # BLD: 4.06 M/UL (ref 4–5.2)
SARS-COV-2, NAAT: NOT DETECTED
SODIUM BLD-SCNC: 137 MMOL/L (ref 136–145)
WBC # BLD: 4.7 K/UL (ref 4–11)

## 2021-10-04 PROCEDURE — 99232 SBSQ HOSP IP/OBS MODERATE 35: CPT | Performed by: HOSPITALIST

## 2021-10-04 PROCEDURE — 2580000003 HC RX 258: Performed by: INTERNAL MEDICINE

## 2021-10-04 PROCEDURE — 85027 COMPLETE CBC AUTOMATED: CPT

## 2021-10-04 PROCEDURE — 6360000002 HC RX W HCPCS: Performed by: NURSE PRACTITIONER

## 2021-10-04 PROCEDURE — 80048 BASIC METABOLIC PNL TOTAL CA: CPT

## 2021-10-04 PROCEDURE — 97116 GAIT TRAINING THERAPY: CPT

## 2021-10-04 PROCEDURE — 6370000000 HC RX 637 (ALT 250 FOR IP): Performed by: INTERNAL MEDICINE

## 2021-10-04 PROCEDURE — 6370000000 HC RX 637 (ALT 250 FOR IP): Performed by: HOSPITALIST

## 2021-10-04 PROCEDURE — 36415 COLL VENOUS BLD VENIPUNCTURE: CPT

## 2021-10-04 PROCEDURE — 6370000000 HC RX 637 (ALT 250 FOR IP): Performed by: NURSE PRACTITIONER

## 2021-10-04 PROCEDURE — 87635 SARS-COV-2 COVID-19 AMP PRB: CPT

## 2021-10-04 PROCEDURE — 97110 THERAPEUTIC EXERCISES: CPT

## 2021-10-04 RX ORDER — LISINOPRIL 10 MG/1
10 TABLET ORAL DAILY
Qty: 30 TABLET | Refills: 3
Start: 2021-10-05

## 2021-10-04 RX ORDER — SODIUM BICARBONATE 650 MG/1
650 TABLET ORAL 2 TIMES DAILY
Qty: 60 TABLET | Refills: 0
Start: 2021-10-04

## 2021-10-04 RX ADMIN — SODIUM BICARBONATE 650 MG TABLET 650 MG: at 08:46

## 2021-10-04 RX ADMIN — ATORVASTATIN CALCIUM 10 MG: 10 TABLET, FILM COATED ORAL at 08:46

## 2021-10-04 RX ADMIN — ACETAMINOPHEN 650 MG: 325 TABLET ORAL at 10:18

## 2021-10-04 RX ADMIN — LISINOPRIL 10 MG: 10 TABLET ORAL at 08:46

## 2021-10-04 RX ADMIN — FUROSEMIDE 40 MG: 40 TABLET ORAL at 08:46

## 2021-10-04 RX ADMIN — MAGNESIUM GLUCONATE 500 MG ORAL TABLET 400 MG: 500 TABLET ORAL at 08:46

## 2021-10-04 RX ADMIN — Medication 10 ML: at 08:47

## 2021-10-04 RX ADMIN — METOPROLOL SUCCINATE 50 MG: 50 TABLET, EXTENDED RELEASE ORAL at 08:46

## 2021-10-04 RX ADMIN — ENOXAPARIN SODIUM 40 MG: 40 INJECTION SUBCUTANEOUS at 08:45

## 2021-10-04 RX ADMIN — ASPIRIN 81 MG: 81 TABLET, CHEWABLE ORAL at 08:46

## 2021-10-04 ASSESSMENT — PAIN SCALES - GENERAL
PAINLEVEL_OUTOF10: 9
PAINLEVEL_OUTOF10: 0

## 2021-10-04 NOTE — CARE COORDINATION
Call back received from Miya Swanson at [facility]. States no referral was received over the weekend. Information provided to Miya Swanson, who states she will review in Epic and call back with determination.

## 2021-10-04 NOTE — CARE COORDINATION
In the interest of a timely discharge, CM arranged transport with [facility] for a 1947  (soonest they had available). 2:22 PM  CM notified nurse, Hemanth Mario, regarding rapid covid test needed and transport eta. CM also notified patient's daughter, Jenny Hutchinson, [phone], regarding discharge this evening/transport eta. Provider messaged for discharge order and signed [facility].

## 2021-10-04 NOTE — CARE COORDINATION
CASE MANAGEMENT DISCHARGE SUMMARY      Discharge to: skilled to [facility] completed: [facility] Exemption Notification (HENS) completed: yes    IMM given: (date) 10/04/21     Transportation:    Medical Transport explained to [facility]. Pt/family voice no agency preference. Agency used: [facility]   time: 2045   Ambulance form completed: Yes    Confirmed discharge plan with:     Patient: yes     Family:  Yes, daughter John Garcia, [phone]     Facility/Agency, name:  [facility]/AVS faxed to facility and Dione Prader in admissions notified     Phone number for report to facility: [phone]     RN, name: Kentrell Harp    Note: Discharging nurse to complete [facility], reconcile AVS, and place final copy with patient's discharge packet. RN to ensure that written prescriptions for Level II medications are sent with patient to the facility as per protocol.     Alex Elena RN

## 2021-10-04 NOTE — CARE COORDINATION
Ref to [facility] on 10/3. No call back from SNF with determination.  left for admissions staff, Benita Delgado, to call CM back.

## 2021-10-04 NOTE — DISCHARGE SUMMARY
spoke with daughter who is the POA and reported that she is agreeable to pursue medication to treat the patient. Patient has been refusing medications.         Hospital Course:         Chest pain: concern for possible ACS. Serial trop neg. Cardio consulted. S/p cath normal coronary arteries with mild LV systolic dysfunction. Continue BB, lisinopril, lasix, asa, statin    Acute encephalopathy: Etiology unclear at this time. CT head. UA neg. CXR showed left ASD from recent pneumonia which she had completed treatment. No clinical signs of acute  pneumonia Pt improved with supportive care and mentation improved to baseline.            AG Metabolic Acidosis: cause unclear. Not diabetic. Lactic acid normal. Nephro assistied. Improved with sodium bicarb tabs which was continued              Non ischemic cardiomyopathy: Patient with significant improvement in EF after catheterization suspect Takotsubo? . Continue lasix, BB, ACEi     Normocytic anemia: No signs or symptoms of bleeding. hgb stable     Hypomagnesemia: replaced. Resolved. DMII: continue home meds      Patient to be  d/domonique to SNF later today when arrangement  completed by . Physical Exam Performed:     BP (!) 122/59   Pulse 57   Temp 98.8 °F (37.1 °C) (Oral)   Resp 16   Ht 5' 3\" (1.6 m)   Wt 141 lb 1.6 oz (64 kg)   SpO2 97%   BMI 24.99 kg/m²       General appearance:  No apparent distress, appears stated age and cooperative. HEENT:  Normal cephalic, atraumatic without obvious deformity. Pupils equal, round,   Extra ocular muscles intact. Conjunctivae/corneas clear. Neck: Supple, with full range of motion. No jugular venous distention. Trachea midline. Respiratory:  Normal respiratory effort. Clear to auscultation, bilaterally without Rales/Wheezes/Rhonchi. Cardiovascular:  Regular rate and rhythm with normal S1/S2 without murmurs, rubs or gallops.   Abdomen: Soft, non-tender, non-distended with normal bowel sounds. Musculoskeletal:  No clubbing, cyanosis or edema bilaterally. Skin: warm and dry  Neurologic:  Neurovascularly intact without any focal sensory/motor deficits. Cranial nerves: II-XII intact, grossly non-focal.  Psychiatric:  Alert and oriented, not anxious appearing        Labs: For convenience and continuity at follow-up the following most recent labs are provided:      CBC:    Lab Results   Component Value Date    WBC 4.7 10/04/2021    HGB 12.0 10/04/2021    HCT 35.8 10/04/2021     10/04/2021       Renal:    Lab Results   Component Value Date     10/04/2021    K 3.6 10/04/2021    CL 96 10/04/2021    CO2 24 10/04/2021    BUN 29 10/04/2021    CREATININE 1.1 10/04/2021    CALCIUM 9.8 10/04/2021    PHOS 4.6 03/27/2015         Significant Diagnostic Studies    Radiology:   CT HEAD WO CONTRAST   Final Result   No acute intracranial abnormality. Mild cerebral atrophy and mild-to-moderate chronic ischemic changes both   appropriate for age. XR CHEST PORTABLE   Final Result   Pneumonia in the left lower lung are still present. There is improvement in   the right base. Cardiac cath      Anatomy:   LM-normal  LAD-normal  Cx-normal  OM1- normal  RCA-normal  RPDA- normal  LVEF-45 to 50% with distal anterior, apical, inferoapical severe hypokinesis to akinesis     Impression:  1. Normal coronary arteries. 2.  Mild LV systolic dysfunction. 3.  Takotsubo cardiomyopathy? 4. Noncardiac chest pain.     Plan:  1. Resume enteric-coated aspirin 81 mg daily. 2.  Coreg/Toprol-XL, ACE inhibitor/ARB. 3.  Diuresis.   Consults:     IP CONSULT TO HOSPITALIST  IP CONSULT TO NEPHROLOGY  IP CONSULT TO PHARMACY  IP CONSULT TO SPIRITUAL SERVICES    Disposition: SNF     Condition at Discharge: Stable    Discharge Instructions/Follow-up:  F/u with nephro in 3-4 weeks       Code Status:  Full Code     Activity: activity as tolerated    Diet: cardiac diet      Discharge Medications:     Current Discharge Medication List           Details   sodium bicarbonate 650 MG tablet Take 1 tablet by mouth 2 times daily  Qty: 60 tablet, Refills: 0              Details   lisinopril (PRINIVIL;ZESTRIL) 10 MG tablet Take 1 tablet by mouth daily  Qty: 30 tablet, Refills: 3              Details   promethazine (PHENERGAN) 12.5 MG tablet Take 12.5 mg by mouth every 6 hours as needed for Nausea      furosemide (LASIX) 40 MG tablet Take 40 mg by mouth daily      empagliflozin (JARDIANCE) 25 MG tablet Take 25 mg by mouth every morning      aspirin 81 MG chewable tablet Take 1 tablet by mouth daily  Qty: 30 tablet, Refills: 1      simvastatin (ZOCOR) 20 MG tablet Take 1 tablet by mouth nightly  Qty: 30 tablet, Refills: 1      metoprolol succinate (TOPROL XL) 50 MG extended release tablet Take 1 tablet by mouth daily  Qty: 30 tablet, Refills: 1      magnesium oxide (MAGNESIUM-OXIDE) 400 (241.3 Mg) MG TABS tablet Take 1 tablet by mouth 2 times daily  Qty: 120 tablet, Refills: 3      levocetirizine (XYZAL) 5 MG tablet Take 5 mg by mouth nightly      Cyanocobalamin (VITAMIN B-12 PO) Take by mouth 2 times daily       VITAMIN D, CHOLECALCIFEROL, PO Take by mouth daily      Dulaglutide (TRULICITY) 3 ZP/8.4TG SOPN Inject 3 mg into the skin once a week       Blood Pressure Monitoring (B-D ASSURE BPM/AUTO ARM CUFF) MISC For essential hypertension  Qty: 1 each, Refills: 0      gabapentin (NEURONTIN) 800 MG tablet Take 800 mg by mouth 3 times daily. tiZANidine (ZANAFLEX) 4 MG tablet Take 4 mg by mouth every 6 hours as needed. metFORMIN (GLUCOPHAGE) 500 MG tablet Take 1,000 mg by mouth 2 times daily (with meals). albuterol (PROVENTIL HFA;VENTOLIN HFA) 108 (90 BASE) MCG/ACT inhaler Inhale 2 puffs into the lungs every 4 hours as needed. Time Spent on discharge is more than 30 minutes in the examination, evaluation, counseling and review of medications and discharge plan.       Signed:    Yumiko Rivas MD   10/4/2021      Thank you Yony Prince for the opportunity to be involved in this patient's care. If you have any questions or concerns please feel free to contact me at [phone].

## 2021-10-04 NOTE — PLAN OF CARE
Problem: Falls - Risk of:  Goal: Will remain free from falls  Description: Will remain free from falls  Outcome: Ongoing  Note: Pt is a High fall risk. See Carylon Kyle Fall Score and ABCDS Injury Risk assessments. Explained fall risk precautions to pt and family and rationale behind their use to keep the patient safe. Pt bed is in low position, side rails up, call light and belongings are in reach. Fall wristband applied and present on pts wrist.  Bed alarm on. Pt encouraged to call for assistance. Will continue with hourly rounds for PO intake, pain needs, toileting and repositioning as needed.

## 2021-10-04 NOTE — CARE COORDINATION
Call back received from [facility]. States facility is able to accept patient. Negative covid needed to admit. Rapid test ordered by LORAINE.

## 2021-10-04 NOTE — PROGRESS NOTES
Physical Therapy  Facility/Department: [facility] B3 - MED SURG  Daily Treatment Note  NAME: Marlin Mathews  : 1950  MRN: [id number]    Date of Service: 10/4/2021    Discharge Recommendations:  Subacute/Skilled Nursing Facility   PT Equipment Recommendations  Equipment Needed: No  Other: defer    Assessment   Body structures, Functions, Activity limitations: Decreased functional mobility ; Decreased balance;Decreased ADL status; Decreased strength;Decreased safe awareness;Decreased cognition;Decreased endurance  Assessment: Pt demos alert this session. Bed mobility was sba, transfers cga/ min A and ambulation min A rw 55.  ' Recommend SNF upon DC to improve safe mobility  Treatment Diagnosis: decreased mobility  Prognosis: Good  Decision Making: Medium Complexity  Barriers to Learning: cognitition  Activity Tolerance  Activity Tolerance: Patient Tolerated treatment well  Activity Tolerance: /54  HR 72  O2 93% RA     Patient Diagnosis(es): The encounter diagnosis was ACS (acute coronary syndrome) ([facility] [address] has a past medical history of Arthritis, Asthma, Diabetes mellitus ([facility] [address] History of palpitations, Hyperlipidemia, and Hypertension. has a past surgical history that includes Cholecystectomy; Gastric bypass surgery (); Hysterectomy; hernia repair; cyst removal; Tonsillectomy; Colonoscopy; Endoscopy, colon, diagnostic; Coronary angioplasty (10/30/14); other surgical history (CYSTOSCOPY, RIGHT URETERAL STONE MANIPULATION WITH RIGHT); Total shoulder arthroplasty (Left, 12/15/2016); Cataract removal with implant (Right, 10/18/2017); and Cataract removal with implant (Left, 2017). Restrictions  Restrictions/Precautions  Restrictions/Precautions: Fall Risk  Position Activity Restriction  Other position/activity restrictions: pure wick  Subjective   General  Chart Reviewed: Yes  Response To Previous Treatment: Patient with no complaints from previous session.   Family / Caregiver Present: No  Subjective  Subjective: agreeable to PT session  General Comment  Comments: cleared by nursing  Pain Screening  Patient Currently in Pain: Denies  Vital Signs  Patient Currently in Pain: Denies       Orientation  Orientation  Overall Orientation Status: Within Normal Limits  Cognition      Objective   Bed mobility  Supine to Sit: Stand by assistance  Transfers  Sit to Stand: Contact guard assistance;Minimal Assistance  Stand to sit: Contact guard assistance;Minimal Assistance  Comment: safety cues: hand placement, turning before sitting.   Ambulation  Ambulation?: Yes  Ambulation 1  Surface: level tile  Device: Rolling Walker  Assistance: Minimal assistance  Quality of Gait: flexed posture, bumped items on her R side initially, early fatigue  Gait Deviations: Slow Leona;Decreased step length;Decreased step height  Distance: 55'' x 2  Comments: no r/o dizziness     Balance  Posture: Fair  Sitting - Static: Good  Sitting - Dynamic: Good;-  Standing - Static: Fair  Standing - Dynamic: Fair (rw)  Exercises  Hip Flexion: 15 B  Hip Abduction: 15 B  Knee Long Arc Quad: 15 B  Ankle Pumps: x20 B             AM-PAC Score  AM-PAC Inpatient Mobility Raw Score : 15 (10/04/21 1103)  AM-PAC Inpatient T-Scale Score : 39.45 (10/04/21 1103)  Mobility Inpatient CMS 0-100% Score: 57.7 (10/04/21 1103)  Mobility Inpatient CMS G-Code Modifier : CK (10/04/21 1103)          Goals  Short term goals  Time Frame for Short term goals: 10/5  Short term goal 1: Pt will complete bed mobility with supervision  -10/04 sba  Short term goal 2: Pt will ambulate x 25' with RW with CGA  -10/04 min A rw min A  Short term goal 3: Pt will complete B LE exercises to improve endurance and mobility by 10/3   -10/04  initiated  Patient Goals   Patient goals : to go home    Plan    Plan  Times per week: 3-5x/week  Current Treatment Recommendations: Strengthening, Balance Training, Endurance Training, Functional Mobility Training, Transfer Training, Gait Training, Equipment Evaluation, Education, & procurement, Patient/Caregiver Education & Training, Safety Education & Training, Home Exercise Program  Safety Devices  Type of devices:  All fall risk precautions in place, Left in chair, Call light within reach, Chair alarm in place, Gait belt, Patient at risk for falls, Nurse notified  Restraints  Initially in place: No     Therapy Time   Individual Concurrent Group Co-treatment   Time In 0805         Time Out 0830         Minutes 25         Timed Code Treatment Minutes: 14[facility]

## 2021-10-04 NOTE — ACP (ADVANCE CARE PLANNING)
Advance Care Planning     Advance Care Planning Inpatient Note  [facility] Department    Today's Date: 10/4/2021  Unit: [facility] B3 - MED SURG    Received request from IDT Member. Upon review of chart and communication with care team, patient's decision making abilities are not in question. . Patient was/were present in the room during visit. Goals of ACP Conversation:  Discuss advance care planning documents    Health Care Decision Makers:       Primary Decision Maker: Michele Courtney - [phone]    Secondary Decision Maker: Kimberly Shepherd  [phone]    Supplemental (Other) Decision Maker: Jeff Pankaj - [phone]    Summary:  Verified Healthcare Decision Maker    Advance Care Planning Documents (Patient Wishes):  Healthcare Power of /Advance Directive Appointment of [address]  Living Will/Advance Directive     Assessment:  Patient states that she has completed Healthcare Power of  and Living Will documents. She verified her decision makers for this . Writer encouraged patient to provide copies of these documents if she is admitted again in the future; also encouraged her to provide documents for any healthcare facility in which she receives care. Patient states that she and her family are very close, and that they are fully aware of her wishes for healthcare. Interventions:  Requested patient/family to submit existing document for our records: Healthcare Power of /Advance Directive [address] Directive    Care Preferences Communicated:   No    Outcomes/Plan:  Requested copies of patient's Healthcare Power of  and Living Will documents.     Electronically signed by Real Melendrez on 10/4/2021 at 4:33 PM

## 2021-10-04 NOTE — FLOWSHEET NOTE
10/04/21 1628   Encounter Summary   Services provided to: Patient   Referral/Consult From: Nurse   [address] [facility]   (10/4 Prayed with patient)   Complexity of Encounter Low   Length of Encounter 30 minutes   Advance Care Planning Yes   Routine   Type Initial   Assessment Approachable;Coping   Intervention Active listening;Explored feelings, thoughts, concerns;Prayer;Sustaining presence/ Ministry of presence; Discussed relationship with God   Outcome Expressed gratitude;Engaged in conversation; Shared life review;Expressed feelings/needs/concerns; Tearful;Grieving; Hopeful      provided support and prayer for patient, who states that her son  one year ago. Patient states that her family is very close, and that she is well supported by her children and numerous grandchildren.

## 2021-10-04 NOTE — PROGRESS NOTES
Office: [phone]       Fax: [phone]      Nephrology Progress Note        Patient's Name: Sharmaine Saunders Date: 9/30/2021  Date of Visit: 10/4/2021    Reason for Consult:  Met acidosis  Requesting Physician:  Francia Seaman MD  PCP: Salvador Anderson    Chief Complaint:  chest pain      History of Present Illness:      Socorro Forde is a 66 y.o. female with PMHx of hypertension, DM who was hospitalized on 9/30/2021 with complaints of chest pain   Had shortness of breath   Pt is being treated for CHF  Has met acidosis  Reports diarrhea recently   No .nausea or vomiting   Recently dc from hospital  Home meds reviewed    INTERVAL HISTORY      Acidosis better   Feels better  Shortness of breath: No   UOP: Good   Creat: stable       Past Medical History:   Diagnosis Date    Arthritis     Asthma     Diabetes mellitus ([address]     type 2, takes PO meds    History of palpitations     Hyperlipidemia     Hypertension        Past Surgical History:   Procedure Laterality Date    CATARACT REMOVAL WITH IMPLANT Right 10/18/2017    entered to epic from h&P    CATARACT REMOVAL WITH IMPLANT Left 11/08/2017    CHOLECYSTECTOMY      COLONOSCOPY      CORONARY ANGIOPLASTY  10/30/14    CYST REMOVAL      from right wrist     ENDOSCOPY, COLON, DIAGNOSTIC      GASTRIC BYPASS SURGERY  2005    HERNIA REPAIR      HYSTERECTOMY      age 30    OTHER SURGICAL HISTORY  CYSTOSCOPY, RIGHT URETERAL STONE MANIPULATION WITH RIGHT    SHOULDER ARTHROPLASTY Left 12/15/2016    LEFT PROXIMAL HUMERUS OPEN REDUCTION INTERNAL FIXATION     TONSILLECTOMY         Family History   Problem Relation Age of Onset    Heart Attack Maternal Grandmother         reports that she has never smoked.  She has never used smokeless tobacco. She reports that she does not drink alcohol and does not use drugs. Medications: Allergies:  Morphine, Sulfamethoxazole, Trimethoprim, Alendronate, Bactrim [sulfamethoxazole-trimethoprim], Buspirone, Calcitonin (salmon), Demerol hcl [meperidine], Dust mite extract, Esomeprazole magnesium, Glipizide, Metformin, Mometasone, Omeprazole, Other, Oxycodone, Oxycodone-acetaminophen, Pcn [penicillins], Percocet [oxycodone-acetaminophen], Prednisone, Propoxyphene, Ranitidine, Sulfa antibiotics, Toradol [ketorolac tromethamine], Tramadol, and Zantac [ranitidine hcl]    Scheduled Meds:   sodium bicarbonate  650 mg Oral BID    furosemide  40 mg Oral Daily    enoxaparin  40 mg SubCUTAneous Daily    magnesium oxide  400 mg Oral BID    aspirin  81 mg Oral Daily    metoprolol succinate  50 mg Oral Daily    sodium chloride flush  5-40 mL IntraVENous 2 times per day    atorvastatin  10 mg Oral Daily    lisinopril  10 mg Oral Daily     Continuous Infusions:   sodium chloride      dextrose         Labs:  CBC:   Recent Labs     10/02/21  0658 10/03/21  0618 10/04/21  0651   WBC 4.9 4.5 4.7   HGB 11.4* 11.4* 12.0    320 338     Ca/Mg/Phos:   Recent Labs     10/02/21  0658 10/03/21  0618 10/04/21  0651   CALCIUM 9.6 9.8 9.8     UA:  Recent Labs     10/02/21  0204   COLORU Yellow   CLARITYU Clear   GLUCOSEU 500*   BILIRUBINUR MODERATE*   KETUA >=80*   SPECGRAV 1.020   BLOODU Negative   PHUR 6.0  6.0   PROTEINU Negative   UROBILINOGEN 0.2   NITRU Negative   LEUKOCYTESUR Negative   LABMICR Not Indicated   URINETYPE NotGiven      Urine Microscopic: No results for input(s): LABCAST, BACTERIA, COMU, HYALCAST, WBCUA, RBCUA, EPIU in the last 72 hours. Urine Chemistry: No results for input(s): Emile Pinky, PROTEINUR, NAUR in the last 72 hours.         Objective:     Vitals: BP (!) 122/59   Pulse 57   Temp 98.8 °F (37.1 °C) (Oral)   Resp 16   Ht 5' 3\" (1.6 m)   Wt 141 lb 1.6 oz (64 kg)   SpO2 97%   BMI 24.99 kg/m²    Wt Readings from Last 3 Encounters:   10/04/21 141 lb 1.6 oz (64 kg)   09/28/21 157 lb (71.2 kg)   06/17/19 120 lb 8 oz (54.7 kg)      24HR INTAKE/OUTPUT:      Intake/Output Summary (Last 24 hours) at 10/4/2021 1620  Last data filed at 10/4/2021 1379  Gross per 24 hour   Intake 370 ml   Output --   Net 370 ml     Constitutional:  awake, NAD  HEENT:  MMM, No icterus  Neck: no bruits, No JVD  Cardiovascular:  reg rhythm  Respiratory: CTA, no crackles  Abdomen:  +BS, soft, NT, ND  Ext: trace lower extremity edema    CNS: alert, no agitation    IMAGING:  CT HEAD WO CONTRAST   Final Result   No acute intracranial abnormality. Mild cerebral atrophy and mild-to-moderate chronic ischemic changes both   appropriate for age. XR CHEST PORTABLE   Final Result   Pneumonia in the left lower lung are still present. There is improvement in   the right base. Assessment :     1. AGMA  -BC <1  -no history of short bowel  -has resp alk    Recent Labs     10/01/21  1954 10/02/21  0658 10/03/21  0618 10/04/21  0651   BUN 21* 24* 26* 29*   CREATININE 1.1 1.0 1.1 1.1     Recent Labs     10/01/21  1954 10/02/21  0658 10/03/21  0618 10/04/21  0651    138 138 137   K 3.6 3.7 3.6 3.6   CO2 19* 19* 21 24         2. HTN  -Blood pressure low normal    BP Readings from Last 1 Encounters:   10/04/21 (!) 122/59       3. Acute on chronic systolic congestive heart failure  ejection fraction of 30-35%. status post [facility] Normal coronary arteries. Plan:       - stable renal standpoint  - check D lactate,   - continue sod bicarb  - minimize saline  - lasix per card  - Monitor BMP    -Monitor I/O, UOP  -Maintain MAP>65  -Avoid nephrotoxin, if able. -Dose meds to current eGFR    Thank you for allowing us to participate in care of Marlin Mathews . We will continue to follow. Feel free to contact me with any questions.       Alissa Adrian MD  10/4/2021    [facility]  [address]  Office : [phone]  Fax :[phone]

## 2021-10-05 NOTE — PROGRESS NOTES
Transport here patient on her way to [facility]. Vitals stable and obtained prior to transport. Notified [facility] that patient is on her way and should be there in an hour. Patient will need night medication given tonight still. RN supervisor is aware of all above information. Report was given off by day shift.

## 2021-10-28 ENCOUNTER — OFFICE VISIT (OUTPATIENT)
Dept: CARDIOLOGY CLINIC | Age: 71
End: 2021-10-28
Payer: MEDICARE

## 2021-10-28 VITALS
OXYGEN SATURATION: 96 % | DIASTOLIC BLOOD PRESSURE: 73 MMHG | BODY MASS INDEX: 27.11 KG/M2 | SYSTOLIC BLOOD PRESSURE: 130 MMHG | WEIGHT: 153 LBS | HEIGHT: 63 IN | HEART RATE: 75 BPM

## 2021-10-28 DIAGNOSIS — I25.10 CORONARY ARTERY DISEASE INVOLVING NATIVE CORONARY ARTERY OF NATIVE HEART WITHOUT ANGINA PECTORIS: ICD-10-CM

## 2021-10-28 DIAGNOSIS — I42.8 NONISCHEMIC CARDIOMYOPATHY (HCC): Primary | ICD-10-CM

## 2021-10-28 DIAGNOSIS — I51.81 TAKOTSUBO CARDIOMYOPATHY: ICD-10-CM

## 2021-10-28 DIAGNOSIS — I51.89 DIASTOLIC DYSFUNCTION: ICD-10-CM

## 2021-10-28 DIAGNOSIS — E78.2 MIXED HYPERLIPIDEMIA: ICD-10-CM

## 2021-10-28 DIAGNOSIS — I10 ESSENTIAL HYPERTENSION: ICD-10-CM

## 2021-10-28 PROCEDURE — 99214 OFFICE O/P EST MOD 30 MIN: CPT | Performed by: INTERNAL MEDICINE

## 2021-10-28 NOTE — LETTER
Annmarie Villafuerte    1950        [address]   Cardiovascular Evaluation    PATIENT: Annmarie Villafuerte  DATE: 19  MRN: [id number]  [facility]: [id number]  : 1950      Primary Care Doctor: Everette Littlejohn     Reason for evaluation:   Follow-up, Congestive Heart Failure, Coronary Artery Disease, Hypertension, Hyperlipidemia, Shortness of Breath (worse with activity and has sob with rest), and Edema (feet and legs)      Subjective:   History of present illness on initial date of evaluation:   Annmarie Villafuerte is a 66 y.o. patient who presents for follow up for chest pain. She reported having intermittent episodes of chest pain for past year. She reported to having chest pain with activity as well as rest.  She awoke with pain at 4 am. She described the pain mid sternal and left anterior chest which felt like a heavy pressure squeezing pain with some burning characteristics. Pain radiated in to her back, shoulder and down her left arm. She had associated SOB, diaphoresis, nausea and near syncope. Pain lasted 15-20 minutes and resolved without intervention. She also had twinges of sharp chest pain periodically. She described a previous episode of similar chest pain that occurred April 3,  2013 and was admitted to [facility] with full work up which was negative. At her last visit she had complaints of palpitations she described as a fast heart rate. She reported to having SOB, fatigue and dizziness. She reported dizziness on exam. She recently wore a 24 hour holter. She reported to continuing to having runs of PVC's,however they resolved quickly on their own. On follow up 11/11/15 she stated that she had been following with Dr. Claudette Gonzáles. Episodes of palpitations had greatly improved with the current medication regimen. Complains of BLE swelling and pain, worse with walking. Pain was in the feet, ankles, calf and glutes.   Wakes up with swelling, worsens through out the day.  Denied exertional chest pain and shortness of breath. On follow-up 12/8/15 she stated that since stopping Norvasc her leg swelling had improved but now her episodes of palpitation have increased. Palpitations occured daily. The episodes are bothersome. She is extremely fatigued after the episodes of palpitation. She wore a holter monitor on 12/8/16 showed asymptomatic idoventricular rhythm. At her last visit her metoprolol was increased to 50 mg BID. She reports today that she is having a sharp chest pain that lasts for about 5 minutes. She does not notice that exertion makes it worse. She does notice that bending over and holding her chest helps relieve the pain. She is short of breath but attributes it to her asthma. She reduced her metoprolol on her own due to dizziness. She had blood ion her stool and will be having a colonoscopy. She was admitted 09/19-09/28/21 with COVID pneumonia. Her echo from 09/22/21 showed her EF was 30-35%. Grade I DD. Mild MR. She was re-admitted 09/30-10/04/21 for CP. Her cardiac cath from 10/01/21 showed normal coronaries. Mild LV dysfunction. Possible Takotsubo cardiomyopathy. Today she reports she has ongoing SOB with activity. She has BLE edema, especially in her feet and toes. She denies CP, palpitations, dizziness or syncope. She was discharged from the hospital to rehab due to her inability to walk. She has only lost 2 lbs since discharged.          Patient Active Problem List   Diagnosis    Chest pain    Essential hypertension    Hyperlipidemia    Gross hematuria    Obstructive uropathy    PVC (premature ventricular contraction)    Palpitations    Coronary artery disease involving native coronary artery of native heart without angina pectoris    Closed fracture of proximal end of left humerus    PNA (pneumonia)    Pneumonia    Abnormal ECG    Aortic valve sclerosis    Shortness of breath    Pulmonary infiltrates    Normocytic normochromic anemia    Thrombocytopenia (HCC)    Elevated LFTs    Nonischemic cardiomyopathy (HCC)    Diastolic dysfunction    Acute respiratory failure with hypoxia (HCC)    THEO [facility]acute kidney injury) ([facility] [address]    Acute on chronic systolic congestive heart failure (HCC)    Metabolic acidosis         Past Medical History:   has a past medical history of Arthritis, Asthma, Diabetes mellitus ([facility] [address] History of palpitations, Hyperlipidemia, and Hypertension. Surgical History:   has a past surgical history that includes Cholecystectomy; Gastric bypass surgery (2005); Hysterectomy; hernia repair; cyst removal; Tonsillectomy; Colonoscopy; Endoscopy, colon, diagnostic; Coronary angioplasty (10/30/14); other surgical history (CYSTOSCOPY, RIGHT URETERAL STONE MANIPULATION WITH RIGHT); Total shoulder arthroplasty (Left, 12/15/2016); Cataract removal with implant (Right, 10/18/2017); and Cataract removal with implant (Left, 11/08/2017). Social History:   She is , retired from [address] [place] [address] and lives with her son in [place]. She reports that she has never smoked. She does not have any smokeless tobacco history on file. She reports that she does not drink alcohol or use illicit drugs. Family History: Dad history unknown  No evidence for sudden cardiac death or premature CAD    Home Medications:  Reviewed and are listed in nursing record.  and/or listed below  Current Outpatient Medications   Medication Sig Dispense Refill    sodium bicarbonate 650 MG tablet Take 1 tablet by mouth 2 times daily 60 tablet 11    lisinopril (PRINIVIL;ZESTRIL) 10 MG tablet Take 1 tablet by mouth daily 30 tablet 3    sodium bicarbonate 650 MG tablet Take 1 tablet by mouth 2 times daily 60 tablet 0    promethazine (PHENERGAN) 12.5 MG tablet Take 12.5 mg by mouth every 6 hours as needed for Nausea      furosemide (LASIX) 40 MG tablet Take 40 mg by mouth daily      aspirin 81 MG chewable tablet Take 1 tablet by mouth daily 30 tablet 1    simvastatin (ZOCOR) 20 MG tablet Take 1 tablet by mouth nightly 30 tablet 1    metoprolol succinate (TOPROL XL) 50 MG extended release tablet Take 1 tablet by mouth daily 30 tablet 1    magnesium oxide (MAGNESIUM-OXIDE) 400 (241.3 Mg) MG TABS tablet Take 1 tablet by mouth 2 times daily 120 tablet 3    levocetirizine (XYZAL) 5 MG tablet Take 5 mg by mouth nightly      Cyanocobalamin (VITAMIN B-12 PO) Take by mouth 2 times daily       VITAMIN D, CHOLECALCIFEROL, PO Take by mouth daily      Dulaglutide (TRULICITY) 3 VI/1.4JZ SOPN Inject 3 mg into the skin once a week       Blood Pressure Monitoring (B-D ASSURE BPM/AUTO ARM CUFF) MISC For essential hypertension 1 each 0    gabapentin (NEURONTIN) 800 MG tablet Take 800 mg by mouth 3 times daily.  tiZANidine (ZANAFLEX) 4 MG tablet Take 4 mg by mouth every 6 hours as needed.  albuterol (PROVENTIL HFA;VENTOLIN HFA) 108 (90 BASE) MCG/ACT inhaler Inhale 2 puffs into the lungs every 4 hours as needed.  empagliflozin (JARDIANCE) 25 MG tablet Take 25 mg by mouth every morning (Patient not taking: Reported on 10/28/2021)      metFORMIN (GLUCOPHAGE) 500 MG tablet Take 1,000 mg by mouth 2 times daily (with meals). (Patient not taking: Reported on 10/28/2021)       No current facility-administered medications for this visit. Allergies:  Morphine, Sulfamethoxazole, Trimethoprim, Alendronate, Bactrim [sulfamethoxazole-trimethoprim], Buspirone, Calcitonin (salmon), Demerol hcl [meperidine], Dust mite extract, Esomeprazole magnesium, Glipizide, Metformin, Mometasone, Omeprazole, Other, Oxycodone, Oxycodone-acetaminophen, Pcn [penicillins], Percocet [oxycodone-acetaminophen], Prednisone, Propoxyphene, Ranitidine, Sulfa antibiotics, Toradol [ketorolac tromethamine], Tramadol, and Zantac [ranitidine hcl]     Review of Systems:   All 12 point review of symptoms completed.  Pertinent positives identified in the HPI, all other review of symptoms Results   Component Value Date    LABA1C 6.3 09/30/2021     Lipids:         Lab Results   Component Value Date    TRIG 113 12/11/2015    TRIG 84 10/30/2014            Lab Results   Component Value Date    HDL 57 12/11/2015    HDL 70 (H) 10/30/2014            Lab Results   Component Value Date    LDLCALC 59 12/11/2015    LDLCALC 77 10/30/2014            Lab Results   Component Value Date    LABVLDL 23 12/11/2015    LABVLDL 17 10/30/2014         CARDIAC DATA   EKG: (reviewed)  10/21/2014 Sinus beba, possible old anterior/septal infarct. 3/27/2015 Sinus rhythm with bigeminy PVC, old anterior infarct pattern,    Holter results: 4/1/15  Holter reviewed. Significant multifocal ectopy. >15% PVC burden, <1% atrial ectopy. Resting beba heart rate. At night, most ectopy especially ventricular ectopy had become quiet. Labs ok    6/17/2019 NSR with PRWP    ECHO 6/22/18:  Normal left ventricle systolic function with an estimated ejection fraction   of 55%. No regional wall motion abnormalities are seen. Grade I diastolic dysfunction with normal filing pressure. The non-coronary cusp of the aortic valve is thickened/calcified with   decreased leaflet mobility. No aortic stenosis noted. Mild pulmonic regurgitation. Mild tricuspid regurgitation. Systolic pulmonary artery pressure (SPAP) is normal and estimated at 24 mmHg   (right atrial pressure 3 mmHg). LV Diastolic Dimension: 4.4 cm LV Systolic Dimension: 9.87 cm   LV Septum Diastolic: 0.7 cm   LV PW Diastolic: 4.92 cm       AO Root Dimension: 2.6 cm                                  AV Cusp Separation: 1.3 cm                                  LA Dimension: 2.7 cm   LVOT: 2.1 cm                   LA Area: 13.4 cm2                                  LA volume/Index: 36.33 ml /23 ml/m2     Echo: 09/22/21  Summary   The left ventricular systolic function is moderately reduced with an   ejection fraction of 30-35%.    There is hypokinesis of the apex, apical lateral, apical septum, anterior,   anteroseptum and apical anterior walls. Grade I diastolic dysfunction with normal filling pressure. Changes noted from previous echo on 6- in left ventricular function. Mild mitral regurgitation. STRESS TEST: 4/3/13  Lexiscan   Findings- Myocardial perfusion is normal at both stress and rest.   Left ventricular cavity size is normal and wall motion is uniform. The estimated left ventricular ejection fraction is 59%       VASCULAR/OTHER IMAGING:  Carotid 1/15/13  No significant plaque or flow limiting internal carotid artery   stenosis       CATH lab 10/30/2014  LM, LAD, LCX, RCA with no significant CAD (RCA with <10%)  Significant kinking and tortousity of vessel  LVEDP 5  LVEF 65%    PLAN  1. No significant CAD (only <10% in prox RCA)  2. CP likley from HTn and stress, possibly from coronary kinking. Cardiac cath: 10/01/21  Anatomy:   LM-normal  LAD-normal  Cx-normal  OM1- normal  RCA-normal  RPDA- normal  LVEF-45 to 50% with distal anterior, apical, inferoapical severe hypokinesis to akinesis     Impression:  1. Normal coronary arteries. 2.  Mild LV systolic dysfunction. 3.  Takotsubo cardiomyopathy? 4. Noncardiac chest pain. Plan:  1. Resume enteric-coated aspirin 81 mg daily. 2.  Coreg/Toprol-XL, ACE inhibitor/ARB. 3.  Diuresis. VIOLA: 11/16/15  Summary    There is no arterial insufficiency in the lower extremities bilaterally at  rest.    VL LE: 11/16/15  Summary    1. There is no evidence of deep or superficial venous thrombosis seen  involving the lower extremities bilaterally. 2. There is no evidence of deep or superficial venous insufficiency  involving the lower extremities bilaterally. Assessment and Plan   Anastasia Ennis is a 66 y.o. female who presents today for the following problems:      1. CAD: cath showed <10% in 2014, very minimal CAD. No  CP    2021 cath with minimal CAD  2. NICM: LVEF 30-25%  3. Abnormal ECG: LBBB  4. PVCs: Old Holter reviewed. Significant multifocal ectopy. >15% PVC burden, <1% atrial ectopy. With medication--> new holter 12/8/2015 with <0.1% burden  5. Hypertension: controlled  6. Aortic sclerosis: no issues, will follow      Plan:  1. Patient recently in hospital with Covid pneumonia respiratory failure and sepsis. During that time she was found to have a decreased EF and abnormal EKG. Subsequent cath showed only minimal CAD and concern for possible Takotsubo cardiomyopathy  2. Continue aspirin, lisinopril, Toprol, Zocor. 3.  Mildly fluid overloaded without cardiac decompensation. Increase Lasix to 40 mg twice daily x3 days  4. Update limited echo for interval LVEF. If remains reduced we will need secondary work-up        QUALITY MEASURES  1. Tobacco Cessation Counseling: NA  2. Retake of BP if >140/90:   NA  3. Documentation to PCP/referring for new patient:  Sent to PCP at close of office visit  4. CAD patient on anti-platelet: Yes  5. CAD patient on STATIN therapy:  Yes  6. Patient with CHF and aFib on anticoagulation:  NA           Scribe's attestation: This note was scribed in the presence of Dr. Gail Coombs by Darlin Can MD, personally performed the services described in this documentation as scribed by the above signed scribe in my presence, and it is both accurate and complete to the best of our ability and knowledge. I agree with the details independently gathered by my clinical support staff, while the remaining scribed note accurately describes my personal service to the patient. The above RN is working as a scribe for and in the presence of myself . Working as a scribe, the RN may have prepopulated components of this note with my historical intellectual property under my direct supervision. Any additions to this intellectual property were performed at my direction.   Furthermore, the content and accuracy of this note have been reviewed by me to the best of my ability. It is a pleasure to assist in the care of Varghese Powers. Please call with any questions.       Sulaiman Haywood MD, [address] [facility] Cardiologist  [address]  [phone] [facility]  [phone] [address] [place] [address]  10/28/2021  1:01 PM

## 2021-10-28 NOTE — PROGRESS NOTES
[address] [facility]   Cardiovascular Evaluation    PATIENT: Anastasai Ennis  DATE: 19  MRN: [id number]  CSN: [id number]  : 1950      Primary Care Doctor: Yousuf Epperson     Reason for evaluation:   Follow-up, Congestive Heart Failure, Coronary Artery Disease, Hypertension, Hyperlipidemia, Shortness of Breath (worse with activity and has sob with rest), and Edema (feet and legs)      Subjective:   History of present illness on initial date of evaluation:   Anastasia Ennis is a 66 y.o. patient who presents for follow up for chest pain. She reported having intermittent episodes of chest pain for past year. She reported to having chest pain with activity as well as rest.  She awoke with pain at 4 am. She described the pain mid sternal and left anterior chest which felt like a heavy pressure squeezing pain with some burning characteristics. Pain radiated in to her back, shoulder and down her left arm. She had associated SOB, diaphoresis, nausea and near syncope. Pain lasted 15-20 minutes and resolved without intervention. She also had twinges of sharp chest pain periodically. She described a previous episode of similar chest pain that occurred April 3,  2013 and was admitted to [facility] with full work up which was negative. At her last visit she had complaints of palpitations she described as a fast heart rate. She reported to having SOB, fatigue and dizziness. She reported dizziness on exam. She recently wore a 24 hour holter. She reported to continuing to having runs of PVC's,however they resolved quickly on their own. On follow up 11/11/15 she stated that she had been following with Dr. ePtty Vinson. Episodes of palpitations had greatly improved with the current medication regimen. Complains of BLE swelling and pain, worse with walking. Pain was in the feet, ankles, calf and glutes. Wakes up with swelling, worsens through out the day.   Denied exertional chest pain and shortness of breath. On follow-up 12/8/15 she stated that since stopping Norvasc her leg swelling had improved but now her episodes of palpitation have increased. Palpitations occured daily. The episodes are bothersome. She is extremely fatigued after the episodes of palpitation. She wore a holter monitor on 12/8/16 showed asymptomatic idoventricular rhythm. At her last visit her metoprolol was increased to 50 mg BID. She reports today that she is having a sharp chest pain that lasts for about 5 minutes. She does not notice that exertion makes it worse. She does notice that bending over and holding her chest helps relieve the pain. She is short of breath but attributes it to her asthma. She reduced her metoprolol on her own due to dizziness. She had blood ion her stool and will be having a colonoscopy. She was admitted 09/19-09/28/21 with COVID pneumonia. Her echo from 09/22/21 showed her EF was 30-35%. Grade I DD. Mild MR. She was re-admitted 09/30-10/04/21 for CP. Her cardiac cath from 10/01/21 showed normal coronaries. Mild LV dysfunction. Possible Takotsubo cardiomyopathy. Today she reports she has ongoing SOB with activity. She has BLE edema, especially in her feet and toes. She denies CP, palpitations, dizziness or syncope. She was discharged from the hospital to rehab due to her inability to walk. She has only lost 2 lbs since discharged.          Patient Active Problem List   Diagnosis    Chest pain    Essential hypertension    Hyperlipidemia    Gross hematuria    Obstructive uropathy    PVC (premature ventricular contraction)    Palpitations    Coronary artery disease involving native coronary artery of native heart without angina pectoris    Closed fracture of proximal end of left humerus    PNA (pneumonia)    Pneumonia    Abnormal ECG    Aortic valve sclerosis    Shortness of breath    Pulmonary infiltrates    Normocytic normochromic anemia    Thrombocytopenia ([address]    tablet Take 1 tablet by mouth nightly 30 tablet 1    metoprolol succinate (TOPROL XL) 50 MG extended release tablet Take 1 tablet by mouth daily 30 tablet 1    magnesium oxide (MAGNESIUM-OXIDE) 400 (241.3 Mg) MG TABS tablet Take 1 tablet by mouth 2 times daily 120 tablet 3    levocetirizine (XYZAL) 5 MG tablet Take 5 mg by mouth nightly      Cyanocobalamin (VITAMIN B-12 PO) Take by mouth 2 times daily       VITAMIN D, CHOLECALCIFEROL, PO Take by mouth daily      Dulaglutide (TRULICITY) 3 LA/0.2NF SOPN Inject 3 mg into the skin once a week       Blood Pressure Monitoring (B-D ASSURE BPM/AUTO ARM CUFF) MISC For essential hypertension 1 each 0    gabapentin (NEURONTIN) 800 MG tablet Take 800 mg by mouth 3 times daily.  tiZANidine (ZANAFLEX) 4 MG tablet Take 4 mg by mouth every 6 hours as needed.  albuterol (PROVENTIL HFA;VENTOLIN HFA) 108 (90 BASE) MCG/ACT inhaler Inhale 2 puffs into the lungs every 4 hours as needed.  empagliflozin (JARDIANCE) 25 MG tablet Take 25 mg by mouth every morning (Patient not taking: Reported on 10/28/2021)      metFORMIN (GLUCOPHAGE) 500 MG tablet Take 1,000 mg by mouth 2 times daily (with meals). (Patient not taking: Reported on 10/28/2021)       No current facility-administered medications for this visit. Allergies:  Morphine, Sulfamethoxazole, Trimethoprim, Alendronate, Bactrim [sulfamethoxazole-trimethoprim], Buspirone, Calcitonin (salmon), Demerol hcl [meperidine], Dust mite extract, Esomeprazole magnesium, Glipizide, Metformin, Mometasone, Omeprazole, Other, Oxycodone, Oxycodone-acetaminophen, Pcn [penicillins], Percocet [oxycodone-acetaminophen], Prednisone, Propoxyphene, Ranitidine, Sulfa antibiotics, Toradol [ketorolac tromethamine], Tramadol, and Zantac [ranitidine hcl]     Review of Systems:   All 12 point review of symptoms completed. Pertinent positives identified in the HPI, all other review of symptoms negative as below.     Objective: LABA1C 6.3 09/30/2021     Lipids:         Lab Results   Component Value Date    TRIG 113 12/11/2015    TRIG 84 10/30/2014            Lab Results   Component Value Date    HDL 57 12/11/2015    HDL 70 (H) 10/30/2014            Lab Results   Component Value Date    LDLCALC 59 12/11/2015    LDLCALC 77 10/30/2014            Lab Results   Component Value Date    LABVLDL 23 12/11/2015    LABVLDL 17 10/30/2014         CARDIAC DATA   EKG: (reviewed)  10/21/2014 Sinus bbea, possible old anterior/septal infarct. 3/27/2015 Sinus rhythm with bigeminy PVC, old anterior infarct pattern,    Holter results: 4/1/15  Holter reviewed. Significant multifocal ectopy. >15% PVC burden, <1% atrial ectopy. Resting beba heart rate. At night, most ectopy especially ventricular ectopy had become quiet. Labs ok    6/17/2019 NSR with PRWP    ECHO 6/22/18:  Normal left ventricle systolic function with an estimated ejection fraction   of 55%. No regional wall motion abnormalities are seen. Grade I diastolic dysfunction with normal filing pressure. The non-coronary cusp of the aortic valve is thickened/calcified with   decreased leaflet mobility. No aortic stenosis noted. Mild pulmonic regurgitation. Mild tricuspid regurgitation. Systolic pulmonary artery pressure (SPAP) is normal and estimated at 24 mmHg   (right atrial pressure 3 mmHg). LV Diastolic Dimension: 4.4 cm LV Systolic Dimension: 9.06 cm   LV Septum Diastolic: 0.7 cm   LV PW Diastolic: 6.58 cm       AO Root Dimension: 2.6 cm                                  AV Cusp Separation: 1.3 cm                                  LA Dimension: 2.7 cm   LVOT: 2.1 cm                   LA Area: 13.4 cm2                                  LA volume/Index: 36.33 ml /23 ml/m2     Echo: 09/22/21  Summary   The left ventricular systolic function is moderately reduced with an   ejection fraction of 30-35%.    There is hypokinesis of the apex, apical lateral, apical septum, anterior, Significant multifocal ectopy. >15% PVC burden, <1% atrial ectopy. With medication--> new holter 12/8/2015 with <0.1% burden  5. Hypertension: controlled  6. Aortic sclerosis: no issues, will follow      Plan:  1. Patient recently in hospital with Covid pneumonia respiratory failure and sepsis. During that time she was found to have a decreased EF and abnormal EKG. Subsequent cath showed only minimal CAD and concern for possible Takotsubo cardiomyopathy  2. Continue aspirin, lisinopril, Toprol, Zocor. 3.  Mildly fluid overloaded without cardiac decompensation. Increase Lasix to 40 mg twice daily x3 days  4. Update limited echo for interval LVEF. If remains reduced we will need secondary work-up        QUALITY MEASURES  1. Tobacco Cessation Counseling: NA  2. Retake of BP if >140/90:   NA  3. Documentation to PCP/referring for new patient:  Sent to PCP at close of office visit  4. CAD patient on anti-platelet: Yes  5. CAD patient on STATIN therapy:  Yes  6. Patient with CHF and aFib on anticoagulation:  NA           Scribe's attestation: This note was scribed in the presence of Dr. Dannie Aguilar by Herb Mendieta MD, personally performed the services described in this documentation as scribed by the above signed scribe in my presence, and it is both accurate and complete to the best of our ability and knowledge. I agree with the details independently gathered by my clinical support staff, while the remaining scribed note accurately describes my personal service to the patient. The above RN is working as a scribe for and in the presence of myself . Working as a scribe, the RN may have prepopulated components of this note with my historical intellectual property under my direct supervision. Any additions to this intellectual property were performed at my direction. Furthermore, the content and accuracy of this note have been reviewed by me to the best of my ability. It is a pleasure to assist in the care of Delmy Martinez. Please call with any questions.       Martinez Kearney MD, [address] Cardiologist  [address]  [phone] [facility]  [phone] [address] [place] [address]  10/28/2021  1:01 PM

## 2021-11-09 ENCOUNTER — HOSPITAL ENCOUNTER (OUTPATIENT)
Dept: CARDIOLOGY | Age: 71
Discharge: HOME | End: 2021-11-09
Payer: MEDICARE

## 2021-11-09 DIAGNOSIS — I42.8 NONISCHEMIC CARDIOMYOPATHY (HCC): ICD-10-CM

## 2021-11-09 PROCEDURE — 93308 TTE F-UP OR LMTD: CPT

## 2021-11-10 ENCOUNTER — TELEPHONE (OUTPATIENT)
Dept: CARDIOLOGY CLINIC | Age: 71
End: 2021-11-10

## 2021-11-10 NOTE — TELEPHONE ENCOUNTER
----- Message from Nilsa Stockton MD sent at 11/9/2021  4:07 PM EST -----  Let patient know echo test shows stable to slightly improved heart function, no new orders or changes at this time. Thanks.

## 2021-11-10 NOTE — TELEPHONE ENCOUNTER
Created telephone encounter. Spoke with Dexter relayed message per [facility] regarding echo. Pt verbalized understanding.

## 2021-12-03 RX ORDER — METOPROLOL SUCCINATE 50 MG/1
TABLET, EXTENDED RELEASE ORAL
Qty: 30 TABLET | Refills: 0 | Status: SHIPPED | OUTPATIENT
Start: 2021-12-03 | End: 2022-01-03

## 2021-12-03 NOTE — PROGRESS NOTES
Perfect serve message sent to Karine Acevedo NP, \"Pt requesting a prn for sleep. Has scheduled 5 mg melatonin nightly but states that she is still unable to sleep tonight. Thanks\", awaiting response.     See new order Yes

## 2022-03-10 ENCOUNTER — TELEPHONE (OUTPATIENT)
Dept: CARDIOLOGY CLINIC | Age: 72
End: 2022-03-10

## 2022-09-01 RX ORDER — SIMVASTATIN 20 MG
TABLET ORAL
Qty: 30 TABLET | Refills: 0 | OUTPATIENT
Start: 2022-09-01

## 2022-10-07 ENCOUNTER — OFFICE VISIT (OUTPATIENT)
Dept: VASCULAR SURGERY | Age: 72
End: 2022-10-07
Payer: MEDICARE

## 2022-10-07 VITALS
DIASTOLIC BLOOD PRESSURE: 60 MMHG | BODY MASS INDEX: 32.25 KG/M2 | HEIGHT: 63 IN | SYSTOLIC BLOOD PRESSURE: 130 MMHG | WEIGHT: 182 LBS

## 2022-10-07 DIAGNOSIS — M79.89 LEG SWELLING: Primary | ICD-10-CM

## 2022-10-07 PROCEDURE — 99203 OFFICE O/P NEW LOW 30 MIN: CPT | Performed by: SURGERY

## 2022-10-07 NOTE — PROGRESS NOTES
Outpatient Consultation / H&P    Date of Consultation:  10/7/2022    PCP:  Monica Forde     Referring Provider:  Dr. Shannon Zhao     Chief Complaint:   Chief Complaint   Patient presents with    Other     Patient was ref by Aileen Gaston MD for pain and swelling in both legs. [facility]        History of Present Illness: We are asked to see this patient in consultation by Dr. Shannon Zhao regarding leg swelling. Emily Phoenix is a 74 y.o. female who reports bilateral leg swelling worse at end of day. She does not wear any compression or support stockings. She has no history of DVT or SVT. She is a diabetic and concerned regarding her circulation. She has no claudication or rest pain symptoms. Past Medical History:  Past Medical History:   Diagnosis Date    Arthritis     Asthma     Diabetes mellitus ([address]     type 2, takes PO meds    History of palpitations     Hyperlipidemia     Hypertension        Past Surgical History:  Past Surgical History:   Procedure Laterality Date    CATARACT REMOVAL WITH IMPLANT Right 10/18/2017    entered to epic from h&P    CATARACT REMOVAL WITH IMPLANT Left 11/08/2017    CHOLECYSTECTOMY      COLONOSCOPY      CORONARY ANGIOPLASTY  10/30/14    CYST REMOVAL      from right wrist     ENDOSCOPY, COLON, DIAGNOSTIC      GASTRIC BYPASS SURGERY  2005    HERNIA REPAIR      HYSTERECTOMY (CERVIX STATUS UNKNOWN)      age 30    OTHER SURGICAL HISTORY  CYSTOSCOPY, RIGHT URETERAL STONE MANIPULATION WITH RIGHT    SHOULDER ARTHROPLASTY Left 12/15/2016    LEFT PROXIMAL HUMERUS OPEN REDUCTION INTERNAL FIXATION     TONSILLECTOMY         Home Medications:   Prior to Admission medications    Medication Sig Start Date End Date Taking?  Authorizing Provider   metoprolol succinate (TOPROL XL) 50 MG extended release tablet TAKE 1 TABLET BY MOUTH ONE TIME DAILY 1/3/22  Yes Flores Dugan, APRN - CNP   sodium bicarbonate 650 MG tablet Take 1 tablet by mouth 2 times daily 10/25/21 10/25/22 Yes Remington Sarmiento Joanna Robbins MD   lisinopril (PRINIVIL;ZESTRIL) 10 MG tablet Take 1 tablet by mouth daily 10/5/21  Yes Brooke Rivas MD   sodium bicarbonate 650 MG tablet Take 1 tablet by mouth 2 times daily 10/4/21  Yes Brooke Rivas MD   promethazine (PHENERGAN) 12.5 MG tablet Take 12.5 mg by mouth every 6 hours as needed for Nausea   Yes Historical Provider, MD   furosemide (LASIX) 40 MG tablet Take 40 mg by mouth daily   Yes Historical Provider, MD   empagliflozin (JARDIANCE) 25 MG tablet Take 25 mg by mouth every morning 4/23/19  Yes Historical Provider, MD   aspirin 81 MG chewable tablet Take 1 tablet by mouth daily 9/28/21  Yes JENN Black CNP   simvastatin (ZOCOR) 20 MG tablet Take 1 tablet by mouth nightly 9/28/21  Yes JENN Black CNP   magnesium oxide (MAGNESIUM-OXIDE) 400 (241.3 Mg) MG TABS tablet Take 1 tablet by mouth 2 times daily 6/28/19  Yes Yifan Lai MD   levocetirizine (XYZAL) 5 MG tablet Take 5 mg by mouth nightly   Yes Historical Provider, MD   Cyanocobalamin (VITAMIN B-12 PO) Take by mouth 2 times daily    Yes Historical Provider, MD   VITAMIN D, CHOLECALCIFEROL, PO Take by mouth daily   Yes Historical Provider, MD   Dulaglutide 3 MG/0.5ML SOPN Inject 3 mg into the skin once a week    Yes Historical Provider, MD   Blood Pressure Monitoring (B-D ASSURE BPM/AUTO ARM CUFF) MISC For essential hypertension 11/11/15  Yes Yifan Lai MD   gabapentin (NEURONTIN) 800 MG tablet Take 800 mg by mouth 3 times daily. Yes Historical Provider, MD   tiZANidine (ZANAFLEX) 4 MG tablet Take 4 mg by mouth every 6 hours as needed. Yes Historical Provider, MD   metFORMIN (GLUCOPHAGE) 500 MG tablet Take 1,000 mg by mouth 2 times daily (with meals)   Yes Historical Provider, MD   albuterol (PROVENTIL HFA;VENTOLIN HFA) 108 (90 BASE) MCG/ACT inhaler Inhale 2 puffs into the lungs every 4 hours as needed.    Yes Historical Provider, MD        Allergies:  Morphine, Sulfamethoxazole, Trimethoprim, Alendronate, Bactrim [sulfamethoxazole-trimethoprim], Buspirone, Calcitonin (salmon), Demerol hcl [meperidine], Dust mite extract, Esomeprazole magnesium, Glipizide, Metformin, Mometasone, Omeprazole, Other, Oxycodone, Oxycodone-acetaminophen, Pcn [penicillins], Percocet [oxycodone-acetaminophen], Prednisone, Propoxyphene, Ranitidine, Sulfa antibiotics, Toradol [ketorolac tromethamine], Tramadol, and Zantac [ranitidine hcl]      Social History:      Social History     Socioeconomic History    Marital status:      Spouse name: Not on file    Number of children: Not on file    Years of education: Not on file    Highest education level: Not on file   Occupational History    Not on file   Tobacco Use    Smoking status: Never    Smokeless tobacco: Never   Vaping Use    Vaping Use: Never used   Substance and Sexual Activity    Alcohol use: No    Drug use: No    Sexual activity: Yes     Partners: Male   Other Topics Concern    Not on file   Social History Narrative    Not on file     Social Determinants of Health     Financial Resource Strain: Not on file   Food Insecurity: Not on file   Transportation Needs: Not on file   Physical Activity: Not on file   Stress: Not on file   Social Connections: Not on file   Intimate Partner Violence: Not on file   Housing Stability: Not on file       Family History:        Problem Relation Age of Onset    Heart Attack Maternal Grandmother        Review of Systems:  A 14 point review of systems was completed. Pertinent positives identified in the HPI, all other review of systems negative. Physical Examination:    /60 (Site: Right Upper Arm)   Ht 5' 3\" (1.6 m)   Wt 182 lb (82.6 kg)   BMI 32.24 kg/m²     Weight: 182 lb (82.6 kg)       General appearance: NAD  Eyes: PERRLA  Neck: no JVD, no lymphadenopathy. Respiratory: effort is unlabored, no crackles, wheezes or rubs. Cardiovascular: regular, no murmur. No carotid bruits.    Pulses:    femoral DP   RIGHT 2 2   LEFT 2 2   GI: abdomen soft, nondistended, no organomegaly. Musculoskeletal: strength and tone normal.  Extremities: warm and pink. Very mild edema  Skin: no dermatitis or ulceration. Neuro/psychiatric: grossly intact. Assessment:      Diagnosis Orders   1. Leg swelling          Very mild edema. Etiology likely multifactoral- Venous insuff, Cardiac disease. No evidence of arterial disease. Recommendations/Plan:  20-30 mmHg knee high support stockings.          Karie Cartwright MD, FACS

## 2022-12-21 ENCOUNTER — HOSPITAL ENCOUNTER (OUTPATIENT)
Dept: MAMMOGRAPHY | Age: 72
Discharge: HOME | End: 2022-12-21
Payer: MEDICARE

## 2022-12-21 VITALS — WEIGHT: 183 LBS | BODY MASS INDEX: 32.43 KG/M2 | HEIGHT: 63 IN

## 2022-12-21 DIAGNOSIS — Z12.31 VISIT FOR SCREENING MAMMOGRAM: ICD-10-CM

## 2022-12-21 PROCEDURE — 77067 SCR MAMMO BI INCL CAD: CPT

## 2023-03-09 ENCOUNTER — HOSPITAL ENCOUNTER (OUTPATIENT)
Dept: GENERAL RADIOLOGY | Age: 73
Discharge: HOME | End: 2023-03-09
Payer: MEDICARE

## 2023-03-09 DIAGNOSIS — Z78.0 POSTMENOPAUSAL STATUS: ICD-10-CM

## 2023-03-09 PROCEDURE — 77080 DXA BONE DENSITY AXIAL: CPT

## 2025-05-19 ENCOUNTER — HOSPITAL ENCOUNTER (OUTPATIENT)
Dept: MAMMOGRAPHY | Age: 75
Discharge: HOME | End: 2025-05-24

## 2025-05-19 DIAGNOSIS — Z12.31 VISIT FOR SCREENING MAMMOGRAM: ICD-10-CM

## 2025-07-22 ENCOUNTER — HOSPITAL ENCOUNTER (OUTPATIENT)
Dept: MAMMOGRAPHY | Age: 75
Discharge: HOME | End: 2025-07-27
Payer: MEDICARE

## 2025-07-22 VITALS — HEIGHT: 63 IN | WEIGHT: 145 LBS | BODY MASS INDEX: 25.69 KG/M2

## 2025-07-22 DIAGNOSIS — Z12.31 VISIT FOR SCREENING MAMMOGRAM: ICD-10-CM

## 2025-07-22 PROCEDURE — 77063 BREAST TOMOSYNTHESIS BI: CPT
